# Patient Record
Sex: MALE | URBAN - METROPOLITAN AREA
[De-identification: names, ages, dates, MRNs, and addresses within clinical notes are randomized per-mention and may not be internally consistent; named-entity substitution may affect disease eponyms.]

---

## 2017-04-19 ENCOUNTER — EMERGENCY (EMERGENCY)
Facility: HOSPITAL | Age: 37
LOS: 1 days | Discharge: LEFT BEFORE DISCHARGE | End: 2017-04-19
Attending: EMERGENCY MEDICINE
Payer: COMMERCIAL

## 2017-04-19 VITALS
HEART RATE: 56 BPM | HEIGHT: 68 IN | DIASTOLIC BLOOD PRESSURE: 65 MMHG | OXYGEN SATURATION: 98 % | SYSTOLIC BLOOD PRESSURE: 135 MMHG | RESPIRATION RATE: 16 BRPM | WEIGHT: 164.91 LBS | TEMPERATURE: 98 F
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
## 2017-04-19 DIAGNOSIS — Z91.013 ALLERGY TO SEAFOOD: ICD-10-CM

## 2017-04-19 DIAGNOSIS — Z79.899 OTHER LONG TERM (CURRENT) DRUG THERAPY: ICD-10-CM
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
## 2017-04-19 DIAGNOSIS — R51 HEADACHE: ICD-10-CM

## 2017-04-19 DIAGNOSIS — S09.90XA UNSPECIFIED INJURY OF HEAD, INITIAL ENCOUNTER: ICD-10-CM

## 2017-04-19 DIAGNOSIS — Z91.018 ALLERGY TO OTHER FOODS: ICD-10-CM

## 2017-04-19 DIAGNOSIS — Z88.8 ALLERGY STATUS TO OTHER DRUGS, MEDICAMENTS AND BIOLOGICAL SUBSTANCES STATUS: ICD-10-CM

## 2017-04-19 PROCEDURE — 99283 EMERGENCY DEPT VISIT LOW MDM: CPT

## 2017-04-19 RX ORDER — ACETAMINOPHEN 500 MG
975 TABLET ORAL ONCE
Qty: 0 | Refills: 0 | Status: DISCONTINUED | OUTPATIENT
Start: 2017-04-19 | End: 2017-04-19

## 2017-04-19 NOTE — ED PROVIDER NOTE - OBJECTIVE STATEMENT
35 y/o male in ED c/o left sided HA since this AM.  pt states h/o migraines but has not had HA x yrs.  pt states given motrin this AM with no relief so started baging head on wall.  sent from [facility] for eval.  pt states HA now improved.  pt denies any LOC, neck pain, cp, sob, n/v/d/abd pain.

## 2017-04-19 NOTE — ED ADULT NURSE NOTE - EXPLANATION OF PATIENT'S REASON FOR LEAVING
Pt stated he did not want to wait for CT or anything else.  Pt denies any pain, states he did not hit his head hard on wall.  Pt neurologically intact.  Pt did not get evaluated by me.

## 2017-04-19 NOTE — ED ADULT NURSE REASSESSMENT NOTE - NS ED NURSE REASSESS COMMENT FT1
[employer] called requesting a psych eval; concerned that patient may not be taking meds as ordered.

## 2017-04-19 NOTE — ED ADULT NURSE NOTE - CHIEF COMPLAINT QUOTE
pt comes to ed outpatient [facility] patient. patient reports headache at this time. + hx of migraines. no other complaints. staff at [facility] reported to ems that keith was hitting his head against wall. no anticoags. patient reports he was doing that because his head hurt so much. denies si/hi.

## 2017-04-19 NOTE — ED ADULT TRIAGE NOTE - CHIEF COMPLAINT QUOTE
pt comes to ed outpatient [facility] patient. patient reports headache at this time. + hx of migraines. no other complaints. staff at [facility] reported to ems that keith was hitting his head against wall. no anticoags. patient reports he was doing that because his head hurt so much. denies si/hi.

## 2017-04-19 NOTE — ED PROVIDER NOTE - CARE PLAN
Principal Discharge DX:	Nonintractable headache, unspecified chronicity pattern, unspecified headache type  Secondary Diagnosis:	Head trauma, initial encounter

## 2017-07-03 ENCOUNTER — EMERGENCY (EMERGENCY)
Facility: HOSPITAL | Age: 37
LOS: 1 days | Discharge: OTHER INSTITUTION | End: 2017-07-03
Attending: EMERGENCY MEDICINE
Payer: COMMERCIAL

## 2017-07-03 VITALS
DIASTOLIC BLOOD PRESSURE: 64 MMHG | TEMPERATURE: 99 F | WEIGHT: 160.06 LBS | RESPIRATION RATE: 16 BRPM | SYSTOLIC BLOOD PRESSURE: 93 MMHG | OXYGEN SATURATION: 97 % | HEART RATE: 101 BPM

## 2017-07-03 VITALS
TEMPERATURE: 98 F | OXYGEN SATURATION: 96 % | SYSTOLIC BLOOD PRESSURE: 107 MMHG | RESPIRATION RATE: 16 BRPM | HEART RATE: 62 BPM | DIASTOLIC BLOOD PRESSURE: 63 MMHG

## 2017-07-03 LAB
ALBUMIN SERPL ELPH-MCNC: 4.5 G/DL — SIGNIFICANT CHANGE UP (ref 3.3–5.2)
ALP SERPL-CCNC: 62 U/L — SIGNIFICANT CHANGE UP (ref 40–120)
ALT FLD-CCNC: 10 U/L — SIGNIFICANT CHANGE UP
ANION GAP SERPL CALC-SCNC: 16 MMOL/L — SIGNIFICANT CHANGE UP (ref 5–17)
APAP SERPL-MCNC: <7.5 UG/ML — LOW (ref 10–26)
AST SERPL-CCNC: 17 U/L — SIGNIFICANT CHANGE UP
BASOPHILS # BLD AUTO: 0 K/UL — SIGNIFICANT CHANGE UP (ref 0–0.2)
BASOPHILS NFR BLD AUTO: 0.3 % — SIGNIFICANT CHANGE UP (ref 0–2)
BILIRUB SERPL-MCNC: 0.8 MG/DL — SIGNIFICANT CHANGE UP (ref 0.4–2)
BUN SERPL-MCNC: 16 MG/DL — SIGNIFICANT CHANGE UP (ref 8–20)
CALCIUM SERPL-MCNC: 9.8 MG/DL — SIGNIFICANT CHANGE UP (ref 8.6–10.2)
CHLORIDE SERPL-SCNC: 102 MMOL/L — SIGNIFICANT CHANGE UP (ref 98–107)
CO2 SERPL-SCNC: 23 MMOL/L — SIGNIFICANT CHANGE UP (ref 22–29)
CREAT SERPL-MCNC: 1.04 MG/DL — SIGNIFICANT CHANGE UP (ref 0.5–1.3)
EOSINOPHIL # BLD AUTO: 0.1 K/UL — SIGNIFICANT CHANGE UP (ref 0–0.5)
EOSINOPHIL NFR BLD AUTO: 0.4 % — SIGNIFICANT CHANGE UP (ref 0–6)
ETHANOL SERPL-MCNC: <10 MG/DL — SIGNIFICANT CHANGE UP
GLUCOSE SERPL-MCNC: 88 MG/DL — SIGNIFICANT CHANGE UP (ref 70–115)
HCT VFR BLD CALC: 42.8 % — SIGNIFICANT CHANGE UP (ref 42–52)
HGB BLD-MCNC: 14.9 G/DL — SIGNIFICANT CHANGE UP (ref 14–18)
LYMPHOCYTES # BLD AUTO: 1.4 K/UL — SIGNIFICANT CHANGE UP (ref 1–4.8)
LYMPHOCYTES # BLD AUTO: 9.9 % — LOW (ref 20–55)
MCHC RBC-ENTMCNC: 33.7 PG — HIGH (ref 27–31)
MCHC RBC-ENTMCNC: 34.8 G/DL — SIGNIFICANT CHANGE UP (ref 32–36)
MCV RBC AUTO: 96.8 FL — HIGH (ref 80–94)
MONOCYTES # BLD AUTO: 1 K/UL — HIGH (ref 0–0.8)
MONOCYTES NFR BLD AUTO: 6.8 % — SIGNIFICANT CHANGE UP (ref 3–10)
NEUTROPHILS # BLD AUTO: 11.8 K/UL — HIGH (ref 1.8–8)
NEUTROPHILS NFR BLD AUTO: 82.5 % — HIGH (ref 37–73)
PLATELET # BLD AUTO: 268 K/UL — SIGNIFICANT CHANGE UP (ref 150–400)
POTASSIUM SERPL-MCNC: 4.6 MMOL/L — SIGNIFICANT CHANGE UP (ref 3.5–5.3)
POTASSIUM SERPL-SCNC: 4.6 MMOL/L — SIGNIFICANT CHANGE UP (ref 3.5–5.3)
PROT SERPL-MCNC: 7.1 G/DL — SIGNIFICANT CHANGE UP (ref 6.6–8.7)
RBC # BLD: 4.42 M/UL — LOW (ref 4.6–6.2)
RBC # FLD: 14.8 % — SIGNIFICANT CHANGE UP (ref 11–15.6)
SALICYLATES SERPL-MCNC: <2 MG/DL — LOW (ref 10–20)
SODIUM SERPL-SCNC: 141 MMOL/L — SIGNIFICANT CHANGE UP (ref 135–145)
WBC # BLD: 14.4 K/UL — HIGH (ref 4.8–10.8)
WBC # FLD AUTO: 14.4 K/UL — HIGH (ref 4.8–10.8)

## 2017-07-03 PROCEDURE — 99285 EMERGENCY DEPT VISIT HI MDM: CPT | Mod: 25

## 2017-07-03 PROCEDURE — 80053 COMPREHEN METABOLIC PANEL: CPT

## 2017-07-03 PROCEDURE — 85027 COMPLETE CBC AUTOMATED: CPT

## 2017-07-03 PROCEDURE — 36415 COLL VENOUS BLD VENIPUNCTURE: CPT

## 2017-07-03 PROCEDURE — 90792 PSYCH DIAG EVAL W/MED SRVCS: CPT

## 2017-07-03 PROCEDURE — 93005 ELECTROCARDIOGRAM TRACING: CPT

## 2017-07-03 PROCEDURE — 99285 EMERGENCY DEPT VISIT HI MDM: CPT

## 2017-07-03 PROCEDURE — 80307 DRUG TEST PRSMV CHEM ANLYZR: CPT

## 2017-07-03 PROCEDURE — 93010 ELECTROCARDIOGRAM REPORT: CPT

## 2017-07-03 NOTE — ED BEHAVIORAL HEALTH NOTE - BEHAVIORAL HEALTH NOTE
SW Note - pt accepted to SE2 at [facility] on 9.37 per Otis PERSON&TAMEKA Carty. Called [employer] for Auth - Ref #[id number] by Kaylin MCMAHAN [facility] to reach out to [facility] on 7/5/17 before [facility]PM [facility] to transport pt to [facility].

## 2017-07-03 NOTE — ED PROVIDER NOTE - OBJECTIVE STATEMENT
36 year old male with a h/o schizophrenia and anxiety presents withh worsening anxiety and requesting to be seen by psych

## 2017-07-03 NOTE — ED ADULT NURSE NOTE - OBJECTIVE STATEMENT
pt comes to ED c/o feeling anxious continually for more than 1 weeks, pt received a&o x3 full affect, manic and tangential with conversations. 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 pt requested to give a urine sample but nursing received what appears to be water, pt confronted with observation and stated "im very careful with my urine , women have drank it and became pregnant" pt wanded in interview area by security and placed in yellow gown. pt comes to ED c/o feeling anxious continually for more than 1 weeks, pt received a&o x3 full affect, manic and tangential with conversations. 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 pt requested to give a urine sample but nursing received what appears to be water, pt confronted with observation and stated "im very careful with my urine , women have drank it and became pregnant" pt wanded in interview area by security and placed in yellow gown.

## 2017-07-03 NOTE — ED BEHAVIORAL HEALTH ASSESSMENT NOTE - SUMMARY
Patient is a 35y/o AA male denies [facility], with documented h/o schizophrenia and poor medication compliance currently under AOT domiciled in a crisis residence awaiting SPA housing, with h/o alcohol abuse, multiple inpatient psychiatric hospitalizations including [facility] x 6 moths, BIBA called by ACT team for increasing paranoia and bizarre behavior.  SW at [facility] spoke with [facility] ACT Team who reported this is not patients baseline and feel patient requires inpatient psychiatric hospitalization   Patient is currently psychotic with paranoid delusions. He denies S/H/I/IP/P, and is currently not aggressive and cooperative. Patient will require inpatient psychiatric hospitalization for safety and medication management status involuntary.

## 2017-07-03 NOTE — ED BEHAVIORAL HEALTH NOTE - BEHAVIORAL HEALTH NOTE
SW Note: Pt BIB [facility] from Crisis residence for paranoid behavior. Spoke with Moira from [facility] -[id number]. ACT team was called to crisis residence to see pt because staff was reporting pt was anxious, pacing and acting bizarre. Moira met with pt and found him to be paranoid. Talking about seeing dead people and fearful that people were following him. he was laughing inappropriately, pacing and appeared anxious. She reports this is not pts baseline and was unsure of any recent stressors/reasons for change in mental status. Staff at crisis report pt has been taking his PO med ( zyprexa). Labs ordered.  Pt is on an active AOT order. Resides at crisis residence pending permanent housing thru [facility]. Hx of substance abuse. AOT and current med sheet faxed to ER for review. Received and placed on chart.  staff aware of pt in the main ER.   SW to follow for eval and disposition

## 2017-07-03 NOTE — ED ADULT TRIAGE NOTE - CHIEF COMPLAINT QUOTE
pt comes to ed from home c/o anxiety. states never formally dx with psychiatric issues "but I feel manic". patient denies si/hi. reports "I need psychiatric help"

## 2017-07-03 NOTE — ED BEHAVIORAL HEALTH ASSESSMENT NOTE - HPI (INCLUDE ILLNESS QUALITY, SEVERITY, DURATION, TIMING, CONTEXT, MODIFYING FACTORS, ASSOCIATED SIGNS AND SYMPTOMS)
Patient is a 35y/o AA male denies PMH, with documented h/o schizophrenia and poor medication compliance currently under AOT domiciled in a crisis residence awaiting SPA housing, with h/o alcohol abuse, multiple inpatient psychiatric hospitalizations including [facility] x 6 moths, BIBA called by ACT team for increasing paranoia and bizarre behavior.  Patient reports he has been seeing people who have resurrected from the dead walking in the community. He believes he is being followed by the dead and mark not understand why he was chosen to witness the resurrection. Patient reports he has been feeling " manic" and has poor sleep, increased energy, racing thoughts, pacing and increased anxiety. He denies current or recent S/H/I/I/P or A/H. Patient reports he does not feel comfortable at his current housing and is eager for SPA housing. Patient has been incarcerated x 2 for burglary last being 1/2017. He reports he has been compliant with his medication however feels he needs adjustment for increased anxiety. He denies recent alcohol or illicit substance use.

## 2017-07-03 NOTE — ED BEHAVIORAL HEALTH ASSESSMENT NOTE - DESCRIPTION
While in medical ED patient left, bought lunch and later returned to ED. Cooperative but guarded. unknown incarcerated x 2, never , AOT, ACT team [facility] incarcerated x 2 for burglary, released from [facility] 1/2017, currently on parole, never , AOT, ACT team [facility]

## 2017-07-03 NOTE — ED PROVIDER NOTE - PROGRESS NOTE DETAILS
pt was transferred to [facility] where he told them that he refuses to give urine because it was going to be used for nefarious purposes. pt to be transferred to [facility] awaiting acceptance

## 2017-07-04 NOTE — ED ADULT NURSE REASSESSMENT NOTE - NS ED NURSE REASSESS COMMENT FT1
compliant with repeat EKG, awaiting transfer to [facility].
pt requested to provide urine but is refusing because "I don't give my urine to laboratories so they can clone me" pt reassured that our laboratories won't do that but pt is adamant.
pt sleeping with no acute distress noted, pt accepted to [facility] via transfer, report given to Josselyn farris via tele con. awaiting transport.
pt currently resting, offers no complaints at this time/, awaiting transfer.

## 2017-07-14 ENCOUNTER — INPATIENT (INPATIENT)
Facility: HOSPITAL | Age: 37
LOS: 6 days | Discharge: HOME | DRG: 917 | End: 2017-07-21
Attending: INTERNAL MEDICINE | Admitting: EMERGENCY MEDICINE
Payer: COMMERCIAL

## 2017-07-14 VITALS — RESPIRATION RATE: 16 BRPM | DIASTOLIC BLOOD PRESSURE: 40 MMHG | HEART RATE: 91 BPM | SYSTOLIC BLOOD PRESSURE: 82 MMHG

## 2017-07-14 DIAGNOSIS — R56.9 UNSPECIFIED CONVULSIONS: ICD-10-CM

## 2017-07-14 DIAGNOSIS — M62.82 RHABDOMYOLYSIS: ICD-10-CM

## 2017-07-14 DIAGNOSIS — G92 TOXIC ENCEPHALOPATHY: ICD-10-CM

## 2017-07-14 DIAGNOSIS — N17.9 ACUTE KIDNEY FAILURE, UNSPECIFIED: ICD-10-CM

## 2017-07-14 DIAGNOSIS — J96.01 ACUTE RESPIRATORY FAILURE WITH HYPOXIA: ICD-10-CM

## 2017-07-14 DIAGNOSIS — E87.5 HYPERKALEMIA: ICD-10-CM

## 2017-07-14 DIAGNOSIS — E87.2 ACIDOSIS: ICD-10-CM

## 2017-07-14 LAB
ABO RH CONFIRMATION: SIGNIFICANT CHANGE UP
ALBUMIN SERPL ELPH-MCNC: 4.9 G/DL — SIGNIFICANT CHANGE UP (ref 3.3–5.2)
ALP SERPL-CCNC: 66 U/L — SIGNIFICANT CHANGE UP (ref 40–120)
ALT FLD-CCNC: 135 U/L — HIGH
AMPHET UR-MCNC: NEGATIVE — SIGNIFICANT CHANGE UP
ANION GAP SERPL CALC-SCNC: 25 MMOL/L — HIGH (ref 5–17)
ANION GAP SERPL CALC-SCNC: 30 MMOL/L — HIGH (ref 5–17)
ANISOCYTOSIS BLD QL: SLIGHT — SIGNIFICANT CHANGE UP
APPEARANCE UR: CLEAR — SIGNIFICANT CHANGE UP
APTT BLD: 32.4 SEC — SIGNIFICANT CHANGE UP (ref 27.5–37.4)
AST SERPL-CCNC: 124 U/L — HIGH
BARBITURATES UR SCN-MCNC: NEGATIVE — SIGNIFICANT CHANGE UP
BENZODIAZ UR-MCNC: NEGATIVE — SIGNIFICANT CHANGE UP
BILIRUB SERPL-MCNC: 0.7 MG/DL — SIGNIFICANT CHANGE UP (ref 0.4–2)
BILIRUB UR-MCNC: NEGATIVE — SIGNIFICANT CHANGE UP
BLD GP AB SCN SERPL QL: SIGNIFICANT CHANGE UP
BUN SERPL-MCNC: 23 MG/DL — HIGH (ref 8–20)
BUN SERPL-MCNC: 23 MG/DL — HIGH (ref 8–20)
CALCIUM SERPL-MCNC: 6.8 MG/DL — LOW (ref 8.6–10.2)
CALCIUM SERPL-MCNC: 7.9 MG/DL — LOW (ref 8.6–10.2)
CHLORIDE SERPL-SCNC: 90 MMOL/L — LOW (ref 98–107)
CHLORIDE SERPL-SCNC: 97 MMOL/L — LOW (ref 98–107)
CO2 SERPL-SCNC: 16 MMOL/L — LOW (ref 22–29)
CO2 SERPL-SCNC: 16 MMOL/L — LOW (ref 22–29)
COCAINE METAB.OTHER UR-MCNC: POSITIVE
COLOR SPEC: YELLOW — SIGNIFICANT CHANGE UP
COMMENT - URINE: SIGNIFICANT CHANGE UP
CREAT SERPL-MCNC: 2.67 MG/DL — HIGH (ref 0.5–1.3)
CREAT SERPL-MCNC: 3.16 MG/DL — HIGH (ref 0.5–1.3)
DIFF PNL FLD: ABNORMAL
ELLIPTOCYTES BLD QL SMEAR: SLIGHT — SIGNIFICANT CHANGE UP
GAS PNL BLDA: SIGNIFICANT CHANGE UP
GAS PNL BLDA: SIGNIFICANT CHANGE UP
GLUCOSE SERPL-MCNC: 154 MG/DL — HIGH (ref 70–115)
GLUCOSE SERPL-MCNC: 163 MG/DL — HIGH (ref 70–115)
GLUCOSE UR QL: NEGATIVE MG/DL — SIGNIFICANT CHANGE UP
HCT VFR BLD CALC: 50.8 % — SIGNIFICANT CHANGE UP (ref 42–52)
HGB BLD-MCNC: 16.7 G/DL — SIGNIFICANT CHANGE UP (ref 14–18)
HYPOCHROMIA BLD QL: SLIGHT — SIGNIFICANT CHANGE UP
INR BLD: 1.02 RATIO — SIGNIFICANT CHANGE UP (ref 0.88–1.16)
KETONES UR-MCNC: ABNORMAL
LACTATE BLDV-MCNC: 8.5 MMOL/L — CRITICAL HIGH (ref 0.5–2)
LEUKOCYTE ESTERASE UR-ACNC: NEGATIVE — SIGNIFICANT CHANGE UP
LYMPHOCYTES # BLD AUTO: 8 % — LOW (ref 20–55)
MACROCYTES BLD QL: SLIGHT — SIGNIFICANT CHANGE UP
MCHC RBC-ENTMCNC: 32.9 G/DL — SIGNIFICANT CHANGE UP (ref 32–36)
MCHC RBC-ENTMCNC: 33.9 PG — HIGH (ref 27–31)
MCV RBC AUTO: 103.3 FL — HIGH (ref 80–94)
METHADONE UR-MCNC: NEGATIVE — SIGNIFICANT CHANGE UP
MICROCYTES BLD QL: SLIGHT — SIGNIFICANT CHANGE UP
MONOCYTES NFR BLD AUTO: 8 % — SIGNIFICANT CHANGE UP (ref 3–10)
MYELOCYTES NFR BLD: 1 % — HIGH (ref 0–0)
NEUTROPHILS NFR BLD AUTO: 83 % — HIGH (ref 37–73)
NITRITE UR-MCNC: NEGATIVE — SIGNIFICANT CHANGE UP
OPIATES UR-MCNC: POSITIVE
OVALOCYTES BLD QL SMEAR: SLIGHT — SIGNIFICANT CHANGE UP
PCP SPEC-MCNC: SIGNIFICANT CHANGE UP
PCP UR-MCNC: NEGATIVE — SIGNIFICANT CHANGE UP
PH UR: 5 — SIGNIFICANT CHANGE UP (ref 5–8)
PLAT MORPH BLD: NORMAL — SIGNIFICANT CHANGE UP
PLATELET # BLD AUTO: 265 K/UL — SIGNIFICANT CHANGE UP (ref 150–400)
POIKILOCYTOSIS BLD QL AUTO: SLIGHT — SIGNIFICANT CHANGE UP
POTASSIUM SERPL-MCNC: 6.8 MMOL/L — CRITICAL HIGH (ref 3.5–5.3)
POTASSIUM SERPL-MCNC: 8.9 MMOL/L — CRITICAL HIGH (ref 3.5–5.3)
POTASSIUM SERPL-SCNC: 6.8 MMOL/L — CRITICAL HIGH (ref 3.5–5.3)
POTASSIUM SERPL-SCNC: 8.9 MMOL/L — CRITICAL HIGH (ref 3.5–5.3)
PROT SERPL-MCNC: 7.3 G/DL — SIGNIFICANT CHANGE UP (ref 6.6–8.7)
PROT UR-MCNC: 100 MG/DL
PROTHROM AB SERPL-ACNC: 11.2 SEC — SIGNIFICANT CHANGE UP (ref 9.8–12.7)
RBC # BLD: 4.92 M/UL — SIGNIFICANT CHANGE UP (ref 4.6–6.2)
RBC # FLD: 15.2 % — SIGNIFICANT CHANGE UP (ref 11–15.6)
RBC BLD AUTO: ABNORMAL
RBC CASTS # UR COMP ASSIST: ABNORMAL /HPF (ref 0–4)
SODIUM SERPL-SCNC: 136 MMOL/L — SIGNIFICANT CHANGE UP (ref 135–145)
SODIUM SERPL-SCNC: 138 MMOL/L — SIGNIFICANT CHANGE UP (ref 135–145)
SP GR SPEC: 1.02 — SIGNIFICANT CHANGE UP (ref 1.01–1.02)
THC UR QL: NEGATIVE — SIGNIFICANT CHANGE UP
TYPE + AB SCN PNL BLD: SIGNIFICANT CHANGE UP
UROBILINOGEN FLD QL: NEGATIVE MG/DL — SIGNIFICANT CHANGE UP
WBC # BLD: 32.2 K/UL — HIGH (ref 4.8–10.8)
WBC # FLD AUTO: 32.2 K/UL — HIGH (ref 4.8–10.8)
WBC UR QL: SIGNIFICANT CHANGE UP

## 2017-07-14 PROCEDURE — 99291 CRITICAL CARE FIRST HOUR: CPT | Mod: 25

## 2017-07-14 PROCEDURE — 71010: CPT | Mod: 26

## 2017-07-14 PROCEDURE — 95822 EEG COMA OR SLEEP ONLY: CPT | Mod: 26

## 2017-07-14 PROCEDURE — 72125 CT NECK SPINE W/O DYE: CPT | Mod: 26

## 2017-07-14 PROCEDURE — 70450 CT HEAD/BRAIN W/O DYE: CPT | Mod: 26

## 2017-07-14 PROCEDURE — 36556 INSERT NON-TUNNEL CV CATH: CPT

## 2017-07-14 PROCEDURE — 99291 CRITICAL CARE FIRST HOUR: CPT

## 2017-07-14 PROCEDURE — 99285 EMERGENCY DEPT VISIT HI MDM: CPT

## 2017-07-14 PROCEDURE — 31500 INSERT EMERGENCY AIRWAY: CPT

## 2017-07-14 RX ORDER — SODIUM CHLORIDE 9 MG/ML
3 INJECTION INTRAMUSCULAR; INTRAVENOUS; SUBCUTANEOUS ONCE
Qty: 0 | Refills: 0 | Status: COMPLETED | OUTPATIENT
Start: 2017-07-14 | End: 2017-07-14

## 2017-07-14 RX ORDER — SODIUM CHLORIDE 9 MG/ML
500 INJECTION INTRAMUSCULAR; INTRAVENOUS; SUBCUTANEOUS
Qty: 0 | Refills: 0 | Status: COMPLETED | OUTPATIENT
Start: 2017-07-14 | End: 2017-07-14

## 2017-07-14 RX ORDER — CHLORHEXIDINE GLUCONATE 213 G/1000ML
15 SOLUTION TOPICAL
Qty: 0 | Refills: 0 | Status: DISCONTINUED | OUTPATIENT
Start: 2017-07-14 | End: 2017-07-15

## 2017-07-14 RX ORDER — PROPOFOL 10 MG/ML
10 INJECTION, EMULSION INTRAVENOUS
Qty: 1000 | Refills: 0 | Status: DISCONTINUED | OUTPATIENT
Start: 2017-07-14 | End: 2017-07-15

## 2017-07-14 RX ORDER — PROPOFOL 10 MG/ML
20 INJECTION, EMULSION INTRAVENOUS
Qty: 1000 | Refills: 0 | Status: DISCONTINUED | OUTPATIENT
Start: 2017-07-14 | End: 2017-07-14

## 2017-07-14 RX ORDER — PANTOPRAZOLE SODIUM 20 MG/1
40 TABLET, DELAYED RELEASE ORAL DAILY
Qty: 0 | Refills: 0 | Status: DISCONTINUED | OUTPATIENT
Start: 2017-07-14 | End: 2017-07-15

## 2017-07-14 RX ORDER — SODIUM CHLORIDE 9 MG/ML
2000 INJECTION INTRAMUSCULAR; INTRAVENOUS; SUBCUTANEOUS ONCE
Qty: 0 | Refills: 0 | Status: COMPLETED | OUTPATIENT
Start: 2017-07-14 | End: 2017-07-14

## 2017-07-14 RX ORDER — ENOXAPARIN SODIUM 100 MG/ML
30 INJECTION SUBCUTANEOUS EVERY 24 HOURS
Qty: 0 | Refills: 0 | Status: DISCONTINUED | OUTPATIENT
Start: 2017-07-14 | End: 2017-07-18

## 2017-07-14 RX ORDER — MAGNESIUM SULFATE 500 MG/ML
2 VIAL (ML) INJECTION ONCE
Qty: 0 | Refills: 0 | Status: COMPLETED | OUTPATIENT
Start: 2017-07-14 | End: 2017-07-14

## 2017-07-14 RX ORDER — INSULIN HUMAN 100 [IU]/ML
10 INJECTION, SOLUTION SUBCUTANEOUS ONCE
Qty: 0 | Refills: 0 | Status: COMPLETED | OUTPATIENT
Start: 2017-07-14 | End: 2017-07-14

## 2017-07-14 RX ORDER — ACTIVATED CHARCOAL 208 MG/ML
50 SUSPENSION ORAL ONCE
Qty: 0 | Refills: 0 | Status: COMPLETED | OUTPATIENT
Start: 2017-07-14 | End: 2017-07-14

## 2017-07-14 RX ORDER — SODIUM CHLORIDE 9 MG/ML
1000 INJECTION, SOLUTION INTRAVENOUS
Qty: 0 | Refills: 0 | Status: DISCONTINUED | OUTPATIENT
Start: 2017-07-14 | End: 2017-07-14

## 2017-07-14 RX ORDER — DEXTROSE 50 % IN WATER 50 %
50 SYRINGE (ML) INTRAVENOUS ONCE
Qty: 0 | Refills: 0 | Status: COMPLETED | OUTPATIENT
Start: 2017-07-14 | End: 2017-07-14

## 2017-07-14 RX ORDER — ROCURONIUM BROMIDE 10 MG/ML
50 VIAL (ML) INTRAVENOUS ONCE
Qty: 0 | Refills: 0 | Status: COMPLETED | OUTPATIENT
Start: 2017-07-14 | End: 2017-07-14

## 2017-07-14 RX ORDER — SODIUM BICARBONATE 1 MEQ/ML
50 SYRINGE (ML) INTRAVENOUS ONCE
Qty: 0 | Refills: 0 | Status: COMPLETED | OUTPATIENT
Start: 2017-07-14 | End: 2017-07-14

## 2017-07-14 RX ORDER — ERTAPENEM SODIUM 1 G/1
1000 INJECTION, POWDER, LYOPHILIZED, FOR SOLUTION INTRAMUSCULAR; INTRAVENOUS ONCE
Qty: 0 | Refills: 0 | Status: COMPLETED | OUTPATIENT
Start: 2017-07-14 | End: 2017-07-14

## 2017-07-14 RX ORDER — NALOXONE HYDROCHLORIDE 4 MG/.1ML
2 SPRAY NASAL ONCE
Qty: 0 | Refills: 0 | Status: COMPLETED | OUTPATIENT
Start: 2017-07-14 | End: 2017-07-14

## 2017-07-14 RX ORDER — SODIUM CHLORIDE 9 MG/ML
1000 INJECTION, SOLUTION INTRAVENOUS
Qty: 0 | Refills: 0 | Status: DISCONTINUED | OUTPATIENT
Start: 2017-07-14 | End: 2017-07-15

## 2017-07-14 RX ORDER — CALCIUM GLUCONATE 100 MG/ML
2 VIAL (ML) INTRAVENOUS ONCE
Qty: 2 | Refills: 0 | Status: COMPLETED | OUTPATIENT
Start: 2017-07-14 | End: 2017-07-14

## 2017-07-14 RX ORDER — CALCIUM GLUCONATE 100 MG/ML
1 VIAL (ML) INTRAVENOUS ONCE
Qty: 1 | Refills: 0 | Status: COMPLETED | OUTPATIENT
Start: 2017-07-14 | End: 2017-07-14

## 2017-07-14 RX ORDER — SODIUM CHLORIDE 9 MG/ML
1000 INJECTION INTRAMUSCULAR; INTRAVENOUS; SUBCUTANEOUS
Qty: 0 | Refills: 0 | Status: DISCONTINUED | OUTPATIENT
Start: 2017-07-14 | End: 2017-07-14

## 2017-07-14 RX ORDER — ETOMIDATE 2 MG/ML
20 INJECTION INTRAVENOUS ONCE
Qty: 0 | Refills: 0 | Status: COMPLETED | OUTPATIENT
Start: 2017-07-14 | End: 2017-07-14

## 2017-07-14 RX ADMIN — SODIUM CHLORIDE 200 MILLILITER(S): 9 INJECTION, SOLUTION INTRAVENOUS at 10:54

## 2017-07-14 RX ADMIN — SODIUM CHLORIDE 2000 MILLILITER(S): 9 INJECTION INTRAMUSCULAR; INTRAVENOUS; SUBCUTANEOUS at 10:45

## 2017-07-14 RX ADMIN — ERTAPENEM SODIUM 120 MILLIGRAM(S): 1 INJECTION, POWDER, LYOPHILIZED, FOR SOLUTION INTRAMUSCULAR; INTRAVENOUS at 10:40

## 2017-07-14 RX ADMIN — Medication 50 GRAM(S): at 09:50

## 2017-07-14 RX ADMIN — SODIUM CHLORIDE 200 MILLILITER(S): 9 INJECTION, SOLUTION INTRAVENOUS at 15:24

## 2017-07-14 RX ADMIN — PROPOFOL 2.55 MICROGRAM(S)/KG/MIN: 10 INJECTION, EMULSION INTRAVENOUS at 12:03

## 2017-07-14 RX ADMIN — NALOXONE HYDROCHLORIDE 2 MILLIGRAM(S): 4 SPRAY NASAL at 09:42

## 2017-07-14 RX ADMIN — SODIUM CHLORIDE 3 MILLILITER(S): 9 INJECTION INTRAMUSCULAR; INTRAVENOUS; SUBCUTANEOUS at 10:47

## 2017-07-14 RX ADMIN — ENOXAPARIN SODIUM 30 MILLIGRAM(S): 100 INJECTION SUBCUTANEOUS at 18:48

## 2017-07-14 RX ADMIN — SODIUM CHLORIDE 2000 MILLILITER(S): 9 INJECTION INTRAMUSCULAR; INTRAVENOUS; SUBCUTANEOUS at 09:45

## 2017-07-14 RX ADMIN — CHLORHEXIDINE GLUCONATE 15 MILLILITER(S): 213 SOLUTION TOPICAL at 18:48

## 2017-07-14 RX ADMIN — SODIUM CHLORIDE 2000 MILLILITER(S): 9 INJECTION INTRAMUSCULAR; INTRAVENOUS; SUBCUTANEOUS at 12:03

## 2017-07-14 RX ADMIN — Medication 2 MILLIGRAM(S): at 12:04

## 2017-07-14 RX ADMIN — SODIUM CHLORIDE 2000 MILLILITER(S): 9 INJECTION INTRAMUSCULAR; INTRAVENOUS; SUBCUTANEOUS at 10:46

## 2017-07-14 RX ADMIN — PANTOPRAZOLE SODIUM 40 MILLIGRAM(S): 20 TABLET, DELAYED RELEASE ORAL at 18:49

## 2017-07-14 RX ADMIN — Medication 200 GRAM(S): at 13:03

## 2017-07-14 RX ADMIN — Medication 4 MILLIGRAM(S): at 13:05

## 2017-07-14 RX ADMIN — Medication 2 MILLIGRAM(S): at 10:00

## 2017-07-14 RX ADMIN — ACTIVATED CHARCOAL 50 GRAM(S): 208 SUSPENSION ORAL at 12:55

## 2017-07-14 RX ADMIN — INSULIN HUMAN 10 UNIT(S): 100 INJECTION, SOLUTION SUBCUTANEOUS at 13:11

## 2017-07-14 RX ADMIN — SODIUM CHLORIDE 2000 MILLILITER(S): 9 INJECTION INTRAMUSCULAR; INTRAVENOUS; SUBCUTANEOUS at 12:04

## 2017-07-14 RX ADMIN — Medication 50 MILLIEQUIVALENT(S): at 09:50

## 2017-07-14 RX ADMIN — SODIUM CHLORIDE 2000 MILLILITER(S): 9 INJECTION INTRAMUSCULAR; INTRAVENOUS; SUBCUTANEOUS at 13:05

## 2017-07-14 RX ADMIN — Medication 50 MILLIGRAM(S): at 09:50

## 2017-07-14 RX ADMIN — Medication 50 MILLILITER(S): at 13:11

## 2017-07-14 RX ADMIN — SODIUM CHLORIDE 2000 MILLILITER(S): 9 INJECTION INTRAMUSCULAR; INTRAVENOUS; SUBCUTANEOUS at 13:04

## 2017-07-14 RX ADMIN — ETOMIDATE 20 MILLIGRAM(S): 2 INJECTION INTRAVENOUS at 09:50

## 2017-07-14 RX ADMIN — Medication 200 GRAM(S): at 16:51

## 2017-07-14 NOTE — H&P ADULT - PROBLEM SELECTOR PLAN 2
EEG monitoring/tech called in  Neurology evaluation  Continue propofol, if EEG positive will add AED

## 2017-07-14 NOTE — CONSULT NOTE ADULT - SUBJECTIVE AND OBJECTIVE BOX
Patient is a 29y old  Male who presents with a chief complaint of     HPI:   30 y/o M with psychiatric hx resident at [place] admitted with likely drug overdose. Intubated for airway protection. On presentation noted to wide complex tachcyardia  and hyperkalemia with profound acidosis. Subsequent ECG after NAHCO3 SR with IRBB.  ROS: Unable to obtain due to intubated and sedated state.    PAST MEDICAL & SURGICAL HISTORY: Unknown      PREVIOUS DIAGNOSTIC TESTING:      Echo : Pending    Allergies    Allergy Status Unknown    Intolerances        MEDICATIONS  (STANDING):  propofol Infusion 20 MICROgram(s)/kG/Min (10.2 mL/Hr) IV Continuous <Continuous>  sodium chloride 0.225% 1000 milliLiter(s) (150 mL/Hr) IV Continuous <Continuous>  enoxaparin Injectable 30 milliGRAM(s) SubCutaneous every 24 hours  pantoprazole  Injectable 40 milliGRAM(s) IV Push daily  chlorhexidine 0.12% Liquid 15 milliLiter(s) Swish and Spit two times a day    MEDICATIONS  (PRN):      FAMILY HISTORY: Unknown      SOCIAL HISTORY: resides at [place]     CIGARETTES:unknown    ALCOHOL:unknown    REVIEW OF SYSTEMS: Unable to obtain       Vital Signs Last 24 Hrs  T(C): 36.3 (2017 10:47), Max: 36.6 (2017 10:33)  T(F): 97.3 (2017 10:47), Max: 97.9 (2017 10:33)  HR: 81 (2017 13:17) (70 - 106)  BP: 108/64 (2017 13:17) (67/37 - 146/89)  BP(mean): --  RR: 28 (2017 13:17) (14 - 30)  SpO2: 98% (2017 10:16) (98% - 100%)    Daily     Daily     I&O's Detail      PHYSICAL EXAM:  Intubated and sedated  HEENT:   Normal oral mucosa, PERRL, EOMI, sclera non-icteric	  Lymphatic: No cervical lymphadenopathy  Cardiovascular: Normal S1 S2, No JVD, No cardiac murmurs, No carotid bruits, No peripheral edema  Respiratory: Lungs clear to auscultation	  Psychiatry: A & O x 3, Mood & affect appropriate  Gastrointestinal:  Soft, Non-tender, + BS, no bruits	  Skin: No rashes, No ecchymoses, No cyanosis  Neurologic: Grossly non-focal with full strength in all four extremities  Extremities: Normal range of motion, No clubbing, cyanosis or edema  Vascular: Peripheral pulses palpable 2+ bilaterally      INTERPRETATION OF TELEMETRY:    ECG: #1 WCT cannot r/o PAF with aberrancy LBBB  ECG #2: post NAHco3 ; SR with IRBBB    LABS:                        16.7   32.2  )-----------( 265      ( 2017 10:10 )             50.8     07-14    138  |  97<L>  |  23.0<H>  ----------------------------<  163<H>  6.8<HH>   |  16.0<L>  |  2.67<H>    Ca    6.8<L>      2017 10:54    TPro  7.3  /  Alb  4.9  /  TBili  0.7  /  DBili  x   /  AST  124<H>  /  ALT  135<H>  /  AlkPhos  66  07-14        PT/INR - ( 2017 10:10 )   PT: 11.2 sec;   INR: 1.02 ratio         PTT - ( 2017 10:10 )  PTT:32.4 sec  Urinalysis Basic - ( 2017 10:28 )    Color: Yellow / Appearance: Clear / S.025 / pH: x  Gluc: x / Ketone: Trace  / Bili: Negative / Urobili: Negative mg/dL   Blood: x / Protein: 100 mg/dL / Nitrite: Negative   Leuk Esterase: Negative / RBC: 3-5 /HPF / WBC 0-2   Sq Epi: x / Non Sq Epi: x / Bacteria: x      I&O's Summary    BNP  RADIOLOGY & ADDITIONAL STUDIES:

## 2017-07-14 NOTE — ED PROVIDER NOTE - CRITICAL CARE PROVIDED
documentation/additional history taking/interpretation of diagnostic studies/consultation with other physicians/direct patient care (not related to procedure)

## 2017-07-14 NOTE — ED PROVIDER NOTE - OBJECTIVE STATEMENT
CC: AMS  Presenting symptoms: 28yo male brought in by EMS from [facility] with unresponsiveness, found unresponsive by staff and sternal rubbing to arouse patient and EMS gave 4mg narcan intranasally and 2mg IV with improved breathing rate.  Patient moaning and AMS on arrival with low O2 sat.  Patient immediately intubated and given multiple medications.  Narcan 2mg IV in ED without change in mental status.  Pertinent Positives: AMS  Timing: constant today  Severity: severe  Patient was found with unknown vile of solution next to him.

## 2017-07-14 NOTE — ED PROCEDURE NOTE - CPROC ED TRACHE INTUB DETAIL1
Patient was pre-oxygenated. An endotracheal tube (ETT) was placed through the vocal cords into the trachea. During intubation, staff applied gentle pressure to the cricoid cartilage. ETT position was confirmed by auscultation of bilateral breath sounds to all lung fields. ETCO2 level was appropriate./Patient connected to ventilator with settings as ordered.

## 2017-07-14 NOTE — H&P ADULT - PROBLEM SELECTOR PLAN 4
Volume resuscitation  Monitor for evidence of compartment syndrome which does not actively have  Bicarb gtt as above

## 2017-07-14 NOTE — H&P ADULT - HISTORY OF PRESENT ILLNESS
Pt is an approx 36 YOM h/o schizophrenia, drug abuse who was found unresponsive in an outpt [facility] facility.  As per ED pt was given multiple doses of Narcan at facility and by EMS, upon arrival in ED pt's respiratory rate improved but his mental status did not so he was intubated for airway protection.  He was treated for profound hyperkalemia with EKG changes which have now improved.  He is unresponsive at present and having jaw clenching and rhythmic and repetitive b/l upper extremities movements.  Pt given propofol.  Attempted to transfer to [facility] for continuous EEG monitoring, which is difficult to obtain in this facility, but transfer was refused.  As per Dr DE LOS SANTOS in ED he spoke with pt's sister, he has h/o paranoid/delusional schizophrenia and refuses to be compliant with his meds so she has not seen him because she feels he is too unstable to be around her family.

## 2017-07-14 NOTE — CONSULT NOTE ADULT - SUBJECTIVE AND OBJECTIVE BOX
Patient is a 29y old  Male who presents with hyperkalemia    HPI: OD  with psych. HX ,Hx from staff and sister. No hx renal disease    PAST MEDICAL & SURGICAL HISTORY:  Unknown.    FAMILY HISTORY:  [place]    Social History: Unknown    MEDICATIONS  (STANDING):  sodium chloride 0.9% 1000 milliLiter(s) (200 mL/Hr) IV Continuous <Continuous>  propofol Infusion 20 MICROgram(s)/kG/Min (10.2 mL/Hr) IV Continuous <Continuous>  insulin regular  human recombinant. 10 Unit(s) IV Push once  dextrose 50% Injectable 50 milliLiter(s) IV Push Once    MEDICATIONS  (PRN):   Meds reviewed    Allergies    Allergy Status Unknown      REVIEW OF SYSTEMS:    CONSTITUTIONAL:  Unknown  EYES: No eye pain, visual disturbances, or discharge  ENMT:  Not Known  NECK: Not [place]  BREASTS: Unknown  RESPIRATORY: unknown  CARDIOVASCULAR: Unknown  GASTROINTESTINAL: Not known   GENITOURINARY : Unknown  ENDOCRINE:: unknown      Vital Signs Last 24 Hrs  T(C): 36.3 (2017 10:47), Max: 36.6 (2017 10:33)  T(F): 97.3 (2017 10:47), Max: 97.9 (2017 10:33)  HR: 88 (2017 11:45) (70 - 106)  BP: 117/61 (2017 11:45) (67/37 - 146/89)  BP(mean): --  RR: 28 (2017 11:45) (14 - 30)  SpO2: 98% (2017 10:16) (98% - 100%)  Daily     Daily     PHYSICAL EXAM:    GENERAL: appears acutely ill, sedated.  HEAD:  Atraumatic, Normocephalic  EYES:  conjunctiva and sclera clear  ENMT: No tonsillar erythema, exudates, or enlargement;   NECK: Supple, neck  veins full  NERVOUS SYSTEM: sedated with pupil reflex present  CHEST/LUNG: Clear to percussion bilaterally; No rales, rhonchi, wheezing, or rubs  HEART: Regular rate and rhythm; No murmurs, rubs, or gallops; sternal area red  ABDOMEN: Soft, Nontender, Nondistended; Bowel sounds present,   EXTREMITIES:  no edema, burn marks both lower arms and dorsal area of feet, no blisters  LYMPH: No lymphadenopathy noted  SKIN: No rashes or lesions, pale   negron with yellow urine    LABS:                        16.7   32.2  )-----------( 265      ( 2017 10:10 )             50.8     07-    138  |  97<L>  |  23.0<H>  ----------------------------<  163<H>  6.8<HH>   |  16.0<L>  |  2.67<H>    Ca    6.8<L>      2017 10:54    TPro  7.3  /  Alb  4.9  /  TBili  0.7  /  DBili  x   /  AST  124<H>  /  ALT  135<H>  /  AlkPhos  66  07-14    PT/INR - ( 2017 10:10 )   PT: 11.2 sec;   INR: 1.02 ratio         PTT - ( 2017 10:10 )  PTT:32.4 sec  Urinalysis Basic - ( 2017 10:28 )    Color: Yellow / Appearance: Clear / S.025 / pH: x  Gluc: x / Ketone: Trace  / Bili: Negative / Urobili: Negative mg/dL   Blood: x / Protein: 100 mg/dL / Nitrite: Negative   Leuk Esterase: Negative / RBC: 3-5 /HPF / WBC 0-2   Sq Epi: x / Non Sq Epi: x / Bacteria: x        ABG - ( 2017 10:28 )  pH: 7.19  /  pCO2: 47    /  pO2: 552   / HCO3: 16    / Base Excess: -10.6 /  SaO2: 100                   RADIOLOGY & ADDITIONAL TESTS:

## 2017-07-14 NOTE — CONSULT NOTE ADULT - SUBJECTIVE AND OBJECTIVE BOX
[facility]                                 Ольга Christie, & Mike                                  [address]                                        [place], [place], [address]                                             [phone]    HISTORY:    (REAL NAME: JUSTICE SHAH; : [birth year])    The patient is a 36y Male who was found unresponsive. He reportedly had seizure in the ER and was intubated and admitted to ICU.     PAST MEDICAL & SURGICAL HISTORY:      MEDICATION PRIOR TO ADMISSION:  Unknown.    Allergies  Allergy Status Unknown    SOCIAL HISTORY:  Unknown although abuses drugs as below.     FAMILY HISTORY:  Unknown.     ROS:  Unobtainable due to patient's condition.       Exam:  Vital Signs Last 24 Hrs  T(F): 97.3 (2017 10:47), Max: 97.9 (2017 10:33)  HR: 81 (2017 13:17) (70 - 106)  BP: 108/64 (2017 13:17) (67/37 - 146/89)  RR: 28 (2017 13:17) (14 - 30)  SpO2: 98% (2017 10:16) (98% - 100%)  General: On mechanical ventilator.   Carotid bruits absent.    Mental status: Unresponsive to voice.     Cranial nerves: There is no papilledema. Pupils 2 mm and sluggish. No response to oculocephalic maneuvers. Weak corneal reflexes.     Motor/Sensory: There is normal bulk and tone.  No movement to stimuli.     Reflexes: 1+ throughout and plantar responses are flexor.    Cerebellar: Cannot be tested.     LABS:                         16.7   32.2  )-----------( 265                   50.8     138  |  97<L>  |  23.0<H>  ----------------------------<  163<H>  6.8<HH>   |  16.0<L>  |  2.67<H>    Ca    6.8<L>      2017 10:54    TPro  7.3  /  Alb  4.9  /  TBili  0.7  /  DBili  x   /  AST  124<H>  /  ALT  135<H>  /  AlkPhos  66  07-14      PT/INR - ( 2017 10:10 )   PT: 11.2 sec;   INR: 1.02 ratio    PTT - ( 2017 10:10 )  PTT:32.4 sec    Toxicology positive for Cocaine and Opiates.     RADIOLOGY & ADDITIONAL STUDIES:  CT head reviewed: there is no acute pathology.

## 2017-07-14 NOTE — ED ADULT NURSE REASSESSMENT NOTE - NS ED NURSE REASSESS COMMENT FT1
pt intubated by dr preston 8.5 tube 27 at the [facility] . respiratory jairo at bedside.

## 2017-07-14 NOTE — ED PROVIDER NOTE - PHYSICAL EXAMINATION
ENT: Airway patent. Nose clear.    Head: Atraumatic.   Eyes: Clear bilaterally. pinpoint  Cardiac: Normal rate.   Respiratory: Rhonchi diffusely   GI: Abdomen soft   Neuro: GCS 5  Skin: Dry, Abrasion to sternum from prehospital sternal rubbing

## 2017-07-14 NOTE — ED PROVIDER NOTE - MEDICAL DECISION MAKING DETAILS
Patient to be transferred to [facility] for MICU and EEG Patient cannot be transferred to [facility] for MICU and EEG at this time due to unstable and need for emergent dialysis.

## 2017-07-14 NOTE — ED PROVIDER NOTE - NOTES
Saw patient in ED and state no EEG at hospital and patient needs to be transferred for further eval. Will see patient for emergent dialysis.

## 2017-07-14 NOTE — H&P ADULT - MENTAL STATUS
Unresponsive, initially having repetitive/rhythmic b/l UE movements as well as clenching jaw and LE twiching, stopped once propofol started

## 2017-07-14 NOTE — CONSULT NOTE ADULT - SUBJECTIVE AND OBJECTIVE BOX
Pt is an approx 29 YOM unknown history who was found unresponsive in an outpatient [facility] building.  Pt was given multiple doses of Narcan via EMS per ED report (note that the ambulance PCR that was scanned for this pt was incorrect) and although respiratory rate increased pt's mental status did not improve and he was intubated in ED for airway protection.  He was treated for hyperkalemia. Pt had been found to have a vial of unknown liquid which may have been urine and as per ED has a h/o IVDA.  Upon my arrival pt was intubated, on no sedation.  His jaw was clenched and he was found to have b/l UE repetitive hand/arm movements possibly c/w myoclonus vs seizure activity

## 2017-07-14 NOTE — ED PROVIDER NOTE - CARE PLAN
Principal Discharge DX:	Acute respiratory failure with hypoxia  Secondary Diagnosis:	Acute renal failure  Secondary Diagnosis:	Hyperkalemia

## 2017-07-14 NOTE — AIRWAY PLACEMENT NOTE ADULT - POST AIRWAY PLACEMENT ASSESSMENT:
skin color improved/breath sounds bilateral/breath sounds equal/positive end tidal CO2 noted/CXR pending/chest excursion noted

## 2017-07-14 NOTE — H&P ADULT - ATTENDING COMMENTS
I personally interviewed and examined the patient, independent of the above practitioner (GAUDENCIO Marshall). I agree with the above, and have updated the appropriate areas of the chart as noted. I spent _60  minutes with the patient and family separate from the above practitioner for my evaluation and interview. The patient is in critical condition and requires ICU care and monitoring. Total Critical Care time spent by attending physician was __60____ minutes, excluding procedure time.     Amita Larson provides me with no information as he is unresponsive and intubated. His real name is Jeremi Garland 1980. The HPI above is the only information we can gather, prior to his event do not have much information. Historically though he has been incarcerated several times, both in the correctional system and [facility] system, and was recently released (1/2017 (?)) but then sent back to [facility] early 7/2017. I personally interviewed and examined the patient, independent of the above practitioner (GAUDENCIO Marshall). I agree with the above, and have updated the appropriate areas of the chart as noted. I spent _60  minutes with the patient and family separate from the above practitioner for my evaluation and interview. The patient is in critical condition and requires ICU care and monitoring. Total Critical Care time spent by attending physician was __60____ minutes, excluding procedure time.     Mr. Kaufman, Critical provides me with no information as he is unresponsive and intubated. His real name is Jeremi Garland 1980. The HPI above is the only information we can gather, prior to his event do not have much information. Historically though he has been incarcerated several times, both in the correctional system and [facility] system, and was recently released (1/2017 (?)) but then sent back to [facility] early 7/2017. It would seem as if he overdosed on some substance and quite possibly had a seizure during or after the ingestion (verbal report from the ED physician noted 'a vial was found next to him'). Initially there was a call to [facility] MICU (Dr. Elizondo) for transfer as they can provide continuos EEGs however they refused based upon the potassium level of 6 (despite improved EKG findings after therapy for potassium = 8). At this time his K is 4.7 with EKG changes on monitor an an ABG @ 1357 [RT provided hard copy, it is not crossing over into [facility], RT has made IT aware]. His exam is limited on the 20mcg/kg/min of propofol, however he does have corneal reflexes and a slight gag, he is over breathing the ventilator. He has a fine tremor in b/l LE, without rigidity, DTRs 2/4 and equal throughout, no inducible clonus, and soft compartments. He has the above abrasions and what appear to be pressure related discolorations on his hands, ankles, and right face (seems as if he was laying down for some time). I find no signs of trauma on him. Is abdominal exam is normal with normal bowel sounds, same as for his cardiac and respiratory exam. Based on exam I cannot define a toxidrome at this time (he remains afebrile as well). His labs are as noted, he does have JONATHAN, probably ATN with some rhabdomyolysis, however is making adequate urine >50mL/hr (after 6 liters of fluid), renal was notified by the ED, however I do not feel that HD is required emergently at this time. His acidosis seems to be metabolic in nature and I presume this will resolve in time as well. He is intubated, patient on ventilator for hypoxia and respiratory failure. Ventilator bundle and lung protective ventilator settings in place. has a negron for his jonathan which should be removed soon; he has urine tox + (cocaine and opioids). We'll repeat labs tonight and manage accordingly. We are getting an EEG and neurology is following.     * Of note, he carries a very violent past with aggression, hearing voice, "having haque that he does not want to talk about because he cannot control them", and a long history of drugs and alcohol since age 12.. please review his old charts in alpha system for more details. He will require a psychiatry consultation once he is more awake as I feel he will likely be transferred to another facility.

## 2017-07-14 NOTE — CONSULT NOTE ADULT - ASSESSMENT
The patient is a 36y Male who was found unresponsive after drug overdose.     Cocaine can certainly provoke seizures.     Would agree with monitoring EEG.  Ativan prn.   If further seizures continue then we may need to start antiepileptic drug.

## 2017-07-14 NOTE — ED PROCEDURE NOTE - CPROC ED INFUS LINE DETAIL1
The location was identified, and the area was draped and prepped./Ultrasound guidance was used during placement./The catheter was placed using sterile technique./The guidewire was recovered.

## 2017-07-14 NOTE — H&P ADULT - PROBLEM SELECTOR PLAN 6
Full ventilatory support  Wean Fi02 to keep sat>92%  Decrease TV to 6cc/kg  Repeat ABG  CXR ETT ok  Wean if mental state improved

## 2017-07-14 NOTE — H&P ADULT - SKIN COMMENTS
b/l dorsal wrist areas of erythema and edema likely due pressure wounds as well as to right check/face

## 2017-07-14 NOTE — ED ADULT TRIAGE NOTE - CHIEF COMPLAINT QUOTE
Pt BIBA from [place], found unresponsive, unknown downtime with pinpoint pupils. Pt was given 4mg Narcan IN and 2mg Narcan IV. Pt diaphoretic upon ED arrival, ashen in color, not tolerating oral secretions. BVM in progress. Code team at bedside stat. Pt BIBA from [place], found unresponsive, unknown downtime with pinpoint pupils. Pt was given 4mg Narcan IN and 2mg Narcan IV. Pt diaphoretic upon ED arrival, ashen in color, not tolerating oral secretions. BVM in progress. Pt arrived with abrasion to mid-sternum secondary to staff at [place] performing sternal rub. Code team at bedside stat. Pt BIBA from [place], found unresponsive, unknown downtime with pinpoint pupils. Pt was given 4mg Narcan IN and 2mg Narcan IV by ems. Pt diaphoretic upon ED arrival, ashen in color, not tolerating oral secretions. BVM in progress. Pt arrived with abrasion to mid-sternum secondary to staff at [place] performing sternal rub. Code team at bedside upon ED arrival.

## 2017-07-14 NOTE — PATIENT PROFILE ADULT. - HOW PATIENT ADDRESSED, PROFILE
pt unresponsive , on propofol, no family present. wearing wrist bracelet from another clinic with name Jeremi Garland : [birth year]

## 2017-07-14 NOTE — PATIENT PROFILE ADULT. - REASON FOR ADMISSION
report received that pt was found next to outpt clinic on [facility], unresponsive, CPR was initiated by bystanders. ? seizure, OD,? psych Dx report received that pt was found next to outpt clinic on [facility], unresponsive, CPR was initiated by bystanders. ? seizure, OD,? psych Dx ? ETOH

## 2017-07-14 NOTE — H&P ADULT - ASSESSMENT
36 YOM with AMS/likely overdose, acute respiratory failure/hypoxia and hypercarbia, acute renal failure likely due rhabdomyolisis

## 2017-07-14 NOTE — ED ADULT NURSE NOTE - CHIEF COMPLAINT QUOTE
Pt BIBA from [place], found unresponsive, unknown downtime with pinpoint pupils. Pt was given 4mg Narcan IN and 2mg Narcan IV by ems. Pt diaphoretic upon ED arrival, ashen in color, not tolerating oral secretions. BVM in progress. Pt arrived with abrasion to mid-sternum secondary to staff at [place] performing sternal rub. Code team at bedside upon ED arrival.

## 2017-07-14 NOTE — ED ADULT NURSE NOTE - OBJECTIVE STATEMENT
28 y/o male presents to ed from [facility] after being found unresponsive. was given 6 mg narcan in transport with minimal success. patient arrives with assisted ventilations, redness to chest from prehospital sternal rub. patient vital signs difficult to obtain. dr preston and code team to bedside. patient unresponsive.

## 2017-07-14 NOTE — H&P ADULT - PROBLEM SELECTOR PLAN 5
Renal evaluation  Treated for hyperkalemia which is resolving with conservative therapy  Hold off on HD for now

## 2017-07-14 NOTE — CHART NOTE - NSCHARTNOTEFT_GEN_A_CORE
spoke with Jennifer corona [phone] identifies her self as his sister; he has no family.    stated that when he wakes up he will be crazy and delusional. she wants him to get help if he survives and she will come see him this evening.

## 2017-07-14 NOTE — H&P ADULT - PROBLEM SELECTOR PLAN 1
Likely due to drug overdose  Initial head CT negative if mental status doesn't improve will need repeat CT vs MRI  R/O seizure as below

## 2017-07-14 NOTE — EEG REPORT - NS EEG TEXT BOX
TECH INFORMATION:   This is a Routine EEG.     Placement and Labeling of Electrodes: The EEG was performed utilizing at least 20 channel referential EEG connections (coronal over temporal over parasagittal montage) with inferior temporal electrodes when indicting and using all standard 10-20 electrode placements with EKG, with additional electrodes placed in the inferior temporal region using the modified 10-10 montage electrode placements for elective admissions, or if deemed necessary.  Recording was at  a sampling rate of 256 samples per second per channel. Time synchronized digital video recording was done simultaneously with EEG recording.  A low light infrared camera was used for low light recording..    History:  This is a 53 year old Male patient with a history of mental status changes.    DESCRIPTION OF RECORDING:   The recording is generally poorly organized.     No posterior dominant rhythm is present.     The background consists of generalized polymorphic asynchronous theta and delta which is generally medium in amplitude.     Some superimposed faster frequencies are present.     No normal features of wakefulness, drowsiness or sleep are present.     Hyperventilation was not performed.    EVENTS:   ·  There were no push button events: .  ·  Compressed Spectral Array (CSA) data was reviewed and correlated with the electrographic findings detailed above.  Jostin and seizure detection were reviewed and there were no additional abnormalities. CSA showed a variable spectral pattern. Areas of increased power in particular were reviewed in details, and compared with the raw EEG data. Areas of abrupt increase in spectral power were reviewed to exclude seizures, and were determined to be artifactual in nature.  ·  The relative ratio of the power of delta range frequencies and faster frequencies remained stable over the course of the study. There was no definitive increase in the relative power in the delta frequency spectrum apparent in the left hemisphere versus the right the hemisphere..      EEG CLASSIFICATION:   Findings: Abnormal.     Generalized background slowing polymorphic theta and delta.    INTERPRETATION:   No seizure sequences occurred.     This is an abnormal EEG indicating moderate diffuse cerebral dysfunction.

## 2017-07-14 NOTE — ED ADULT NURSE REASSESSMENT NOTE - NS ED NURSE REASSESS COMMENT FT1
vent settings ac 16/450/5/100% vent settings ac 16/450/5/100%. 1 ml push dose epi given by dr flores

## 2017-07-15 DIAGNOSIS — T50.901A POISONING BY UNSPECIFIED DRUGS, MEDICAMENTS AND BIOLOGICAL SUBSTANCES, ACCIDENTAL (UNINTENTIONAL), INITIAL ENCOUNTER: ICD-10-CM

## 2017-07-15 DIAGNOSIS — F20.0 PARANOID SCHIZOPHRENIA: ICD-10-CM

## 2017-07-15 DIAGNOSIS — F19.10 OTHER PSYCHOACTIVE SUBSTANCE ABUSE, UNCOMPLICATED: ICD-10-CM

## 2017-07-15 LAB
ANION GAP SERPL CALC-SCNC: 10 MMOL/L — SIGNIFICANT CHANGE UP (ref 5–17)
BUN SERPL-MCNC: 15 MG/DL — SIGNIFICANT CHANGE UP (ref 8–20)
CALCIUM SERPL-MCNC: 7.5 MG/DL — LOW (ref 8.6–10.2)
CHLORIDE SERPL-SCNC: 103 MMOL/L — SIGNIFICANT CHANGE UP (ref 98–107)
CK MB CFR SERPL CALC: 56.8 NG/ML — HIGH (ref 0–6.7)
CK SERPL-CCNC: 6134 U/L — HIGH (ref 30–200)
CO2 SERPL-SCNC: 29 MMOL/L — SIGNIFICANT CHANGE UP (ref 22–29)
CREAT SERPL-MCNC: 1.13 MG/DL — SIGNIFICANT CHANGE UP (ref 0.5–1.3)
CULTURE RESULTS: NO GROWTH — SIGNIFICANT CHANGE UP
GLUCOSE SERPL-MCNC: 96 MG/DL — SIGNIFICANT CHANGE UP (ref 70–115)
HCT VFR BLD CALC: 38.7 % — LOW (ref 42–52)
HGB BLD-MCNC: 13 G/DL — LOW (ref 14–18)
MAGNESIUM SERPL-MCNC: 2.3 MG/DL — SIGNIFICANT CHANGE UP (ref 1.6–2.6)
MCHC RBC-ENTMCNC: 33.3 PG — HIGH (ref 27–31)
MCHC RBC-ENTMCNC: 33.6 G/DL — SIGNIFICANT CHANGE UP (ref 32–36)
MCV RBC AUTO: 99.2 FL — HIGH (ref 80–94)
PHOSPHATE SERPL-MCNC: 1.8 MG/DL — LOW (ref 2.4–4.7)
PLATELET # BLD AUTO: 81 K/UL — LOW (ref 150–400)
POTASSIUM SERPL-MCNC: 4.5 MMOL/L — SIGNIFICANT CHANGE UP (ref 3.5–5.3)
POTASSIUM SERPL-SCNC: 4.5 MMOL/L — SIGNIFICANT CHANGE UP (ref 3.5–5.3)
RBC # BLD: 3.9 M/UL — LOW (ref 4.6–6.2)
RBC # FLD: 15.3 % — SIGNIFICANT CHANGE UP (ref 11–15.6)
SODIUM SERPL-SCNC: 142 MMOL/L — SIGNIFICANT CHANGE UP (ref 135–145)
SPECIMEN SOURCE: SIGNIFICANT CHANGE UP
WBC # BLD: 15 K/UL — HIGH (ref 4.8–10.8)
WBC # FLD AUTO: 15 K/UL — HIGH (ref 4.8–10.8)

## 2017-07-15 PROCEDURE — 99232 SBSQ HOSP IP/OBS MODERATE 35: CPT

## 2017-07-15 PROCEDURE — 95951: CPT | Mod: 26

## 2017-07-15 PROCEDURE — 99233 SBSQ HOSP IP/OBS HIGH 50: CPT

## 2017-07-15 PROCEDURE — 99222 1ST HOSP IP/OBS MODERATE 55: CPT

## 2017-07-15 RX ORDER — SODIUM CHLORIDE 9 MG/ML
1000 INJECTION, SOLUTION INTRAVENOUS
Qty: 0 | Refills: 0 | Status: DISCONTINUED | OUTPATIENT
Start: 2017-07-15 | End: 2017-07-16

## 2017-07-15 RX ORDER — KETOROLAC TROMETHAMINE 30 MG/ML
15 SYRINGE (ML) INJECTION EVERY 8 HOURS
Qty: 0 | Refills: 0 | Status: DISCONTINUED | OUTPATIENT
Start: 2017-07-15 | End: 2017-07-18

## 2017-07-15 RX ORDER — OLANZAPINE 15 MG/1
20 TABLET, FILM COATED ORAL AT BEDTIME
Qty: 0 | Refills: 0 | Status: DISCONTINUED | OUTPATIENT
Start: 2017-07-15 | End: 2017-07-21

## 2017-07-15 RX ORDER — ACETAMINOPHEN 500 MG
650 TABLET ORAL EVERY 6 HOURS
Qty: 0 | Refills: 0 | Status: DISCONTINUED | OUTPATIENT
Start: 2017-07-15 | End: 2017-07-21

## 2017-07-15 RX ORDER — OLANZAPINE 15 MG/1
10 TABLET, FILM COATED ORAL EVERY 4 HOURS
Qty: 0 | Refills: 0 | Status: DISCONTINUED | OUTPATIENT
Start: 2017-07-15 | End: 2017-07-21

## 2017-07-15 RX ORDER — OLANZAPINE 15 MG/1
20 TABLET, FILM COATED ORAL
Qty: 0 | Refills: 0 | Status: DISCONTINUED | OUTPATIENT
Start: 2017-07-15 | End: 2017-07-15

## 2017-07-15 RX ADMIN — SODIUM CHLORIDE 200 MILLILITER(S): 9 INJECTION, SOLUTION INTRAVENOUS at 06:29

## 2017-07-15 RX ADMIN — PROPOFOL 4.94 MICROGRAM(S)/KG/MIN: 10 INJECTION, EMULSION INTRAVENOUS at 06:29

## 2017-07-15 RX ADMIN — ENOXAPARIN SODIUM 30 MILLIGRAM(S): 100 INJECTION SUBCUTANEOUS at 16:58

## 2017-07-15 RX ADMIN — OLANZAPINE 10 MILLIGRAM(S): 15 TABLET, FILM COATED ORAL at 15:04

## 2017-07-15 RX ADMIN — Medication 650 MILLIGRAM(S): at 23:33

## 2017-07-15 RX ADMIN — SODIUM CHLORIDE 150 MILLILITER(S): 9 INJECTION, SOLUTION INTRAVENOUS at 10:00

## 2017-07-15 RX ADMIN — Medication 650 MILLIGRAM(S): at 22:08

## 2017-07-15 RX ADMIN — Medication 650 MILLIGRAM(S): at 14:59

## 2017-07-15 RX ADMIN — SODIUM CHLORIDE 150 MILLILITER(S): 9 INJECTION, SOLUTION INTRAVENOUS at 22:11

## 2017-07-15 RX ADMIN — SODIUM CHLORIDE 200 MILLILITER(S): 9 INJECTION, SOLUTION INTRAVENOUS at 02:17

## 2017-07-15 RX ADMIN — OLANZAPINE 20 MILLIGRAM(S): 15 TABLET, FILM COATED ORAL at 22:05

## 2017-07-15 RX ADMIN — Medication 15 MILLIGRAM(S): at 16:57

## 2017-07-15 RX ADMIN — PROPOFOL 4.94 MICROGRAM(S)/KG/MIN: 10 INJECTION, EMULSION INTRAVENOUS at 02:43

## 2017-07-15 RX ADMIN — Medication 15 MILLIGRAM(S): at 17:15

## 2017-07-15 RX ADMIN — CHLORHEXIDINE GLUCONATE 15 MILLILITER(S): 213 SOLUTION TOPICAL at 06:29

## 2017-07-15 RX ADMIN — PANTOPRAZOLE SODIUM 40 MILLIGRAM(S): 20 TABLET, DELAYED RELEASE ORAL at 12:01

## 2017-07-15 RX ADMIN — Medication 650 MILLIGRAM(S): at 17:00

## 2017-07-15 NOTE — BEHAVIORAL HEALTH ASSESSMENT NOTE - OTHER PAST PSYCHIATRIC HISTORY (INCLUDE DETAILS REGARDING ONSET, COURSE OF ILLNESS, INPATIENT/OUTPATIENT TREATMENT)
Schizophrenia (Hallucinations of grandeur) - Zyprexa; Aggression; Polysubstance abuse  on AOT upon release from residential 1/2017  extensive history see core history and AOT order in alpha chart 7/03/2017  admitted to [facility] 7/3/17 -7/13/17  [facility] ACT TEAM

## 2017-07-15 NOTE — EEG REPORT - NS EEG TEXT BOX
History:  This is a 53 year old Male patient with a history of mental status changes.    DESCRIPTION OF RECORDING:   The recording is generally poorly organized.   No posterior dominant rhythm is present with more diffuse 7-8hz activity in max wakefulness.   The background consists of generalized polymorphic asynchronous theta and delta which is generally medium in amplitude. Increased 2hz delta with stimulation and arousal noted.  Some superimposed faster frequencies are present.     Normal sleep transients not recorded.    Hyperventilation was not performed.  Increased electrode artfacts overnight limiting early AM interpretation      EVENTS:   Two push button events: no EEG abnormalities associated.      ·  Compressed Spectral Array (CSA) data was reviewed and correlated with the electrographic findings detailed above.  Jostin and seizure detection were reviewed and there were no additional abnormalities. CSA showed a variable spectral pattern. Areas of increased power in particular were reviewed in details, and compared with the raw EEG data. Areas of abrupt increase in spectral power were reviewed to exclude seizures, and were determined to be artifactual in nature.  ·  The relative ratio of the power of delta range frequencies and faster frequencies remained stable over the course of the study. There was no definitive increase in the relative power in the delta frequency spectrum apparent in the left hemisphere versus the right the hemisphere..      EEG CLASSIFICATION:   Findings: Abnormal.   Generalized background slowing polymorphic theta and delta.      INTERPRETATION:   No seizure sequences occurred.   This is an abnormal EEG indicating moderate diffuse cerebral dysfunction. History:  This is a 53 year old Male patient with a history of mental status changes.    DESCRIPTION OF RECORDING:   The recording is generally poorly organized.   No posterior dominant rhythm is present with more diffuse 7-8hz activity in max wakefulness.   The background consists of generalized polymorphic asynchronous theta and delta which is generally medium in amplitude. Increased 2hz delta with stimulation and arousal noted.  Some superimposed faster frequencies are present.     Normal sleep transients not recorded.    Hyperventilation was not performed.  Increased electrode artfacts overnight limiting early AM interpretation      EVENTS:   Several push button events per staff: no EEG abnormalities associated.      ·  Compressed Spectral Array (CSA) data was reviewed and correlated with the electrographic findings detailed above.  Jostin and seizure detection were reviewed and there were no additional abnormalities. CSA showed a variable spectral pattern. Areas of increased power in particular were reviewed in details, and compared with the raw EEG data. Areas of abrupt increase in spectral power were reviewed to exclude seizures, and were determined to be artifactual in nature.  ·  The relative ratio of the power of delta range frequencies and faster frequencies remained stable over the course of the study. There was no definitive increase in the relative power in the delta frequency spectrum apparent in the left hemisphere versus the right the hemisphere..      EEG CLASSIFICATION:   Findings: Abnormal.   Generalized background slowing polymorphic theta and delta.      INTERPRETATION:   No seizure sequences occurred.   This is an abnormal EEG indicating moderate diffuse cerebral dysfunction.

## 2017-07-15 NOTE — CONSULT NOTE ADULT - ATTENDING COMMENTS
Stat HD, IV fluid , Meds
I have read, reviewed, edited and approve of note above.  Most of the complaints regarding the R hand are about the large blister that is on his ulnar edge of his hand.  With it drained he should improve.  Agree c/ local dressing care.  Can f/u as outpt for wound check.  Con't current management of rhabdo and psychosocial issues.

## 2017-07-15 NOTE — CONSULT NOTE ADULT - ASSESSMENT
37 YO M with AMS/likely overdose, acute respiratory failure/hypoxia and hypercarbia, acute renal failure likely due rhabdomyolisis. Surgery called to r/o compartment syndrome of RUE  -No signs of compartment syndrome  -Hand bullae drained using 18 gauge needle, dressed with bacitracin, dressing orders.  -Will sign off

## 2017-07-15 NOTE — BEHAVIORAL HEALTH ASSESSMENT NOTE - OTHER
ICU Doctor and Crisis Center employee At facility Information from Crisis center H/o psychosis Agitated Not assessed Not assessed; In bed Lethargic Limited responses to questions one two word responses

## 2017-07-15 NOTE — CONSULT NOTE ADULT - SUBJECTIVE AND OBJECTIVE BOX
HPI:  Pt is an approx 36 YOM h/o schizophrenia, drug abuse who was found unresponsive in an outpt [place] facility.  As per ED pt was given multiple doses of Narcan at facility and by EMS, upon arrival in ED pt's respiratory rate improved but his mental status did not so he was intubated for airway protection.  He was treated for profound hyperkalemia with EKG changes which have now improved.  He is unresponsive at present and having jaw clenching and rhythmic and repetitive b/l upper extremities movements.  Pt given propofol.  Attempted to transfer to [place] for continuous EEG monitoring, which is difficult to obtain in this facility, but transfer was refused.  As per Dr DE LOS SANTOS in ED he spoke with pt's sister, he has h/o paranoid/delusional schizophrenia and refuses to be compliant with his meds so she has not seen him because she feels he is too unstable to be around her family. (2017 13:42)    Surgery called to evaluate to r/o compartment syndrome    PAST MEDICAL & SURGICAL HISTORY:  Drug use  Paranoid schizophrenia  No significant past surgical history      REVIEW OF SYSTEMS: all systems reviewed, negative except as above    MEDICATIONS  (STANDING):  enoxaparin Injectable 30 milliGRAM(s) SubCutaneous every 24 hours  multiple electrolytes Injection Type 1 1000 milliLiter(s) (150 mL/Hr) IV Continuous <Continuous>  OLANZapine 20 milliGRAM(s) Oral at bedtime    MEDICATIONS  (PRN):  OLANZapine Disintegrating Tablet 10 milliGRAM(s) Oral every 4 hours PRN agitation  acetaminophen   Tablet. 650 milliGRAM(s) Oral every 6 hours PRN Mild Pain (1 - 3)  ketorolac   Injectable 15 milliGRAM(s) IV Push every 8 hours PRN Moderate Pain (4 - 6)      Allergies    Allergy Status Unknown    Intolerances    FAMILY HISTORY:  No pertinent family history in first degree relatives      Vital Signs Last 24 Hrs  T(C): 35.7 (15 Jul 2017 16:00), Max: 37.5 (2017 20:00)  T(F): 96.2 (15 Jul 2017 16:00), Max: 99.5 (2017 20:00)  HR: 78 (15 Jul 2017 16:00) (66 - 90)  BP: 142/80 (15 Jul 2017 16:00) (115/74 - 142/80)  BP(mean): 86 (15 Jul 2017 07:00) (86 - 98)  RR: 20 (15 Jul 2017 16:00) (5 - 23)  SpO2: 98% (15 Jul 2017 12:00) (96% - 99%)    General: No acute distress.  Alert, oriented, interactive. Follows simple instructions    HEENT: Atraumatic, MMM, Pupils equal, reactive to light.  Symmetric.     PULM: Clear to auscultation bilaterally, no significant sputum production    CVS: Regular rate and rhythm, no murmurs, rubs; S1/S2. No JVD/HJR    ABD: Soft, nondistended, nontender, normoactive bowel sounds, no masses    EXT: No edema, nontender. Distal pulses 2+ equal bilaterally. 4x5cm blisters to right hand, dorsal and ventral aspects, left wrist 3x4, bilateral ankles various sizes (consistent with crossed extremities and laying on them), compartments soft, palpable pulses.    SKIN: Warm and well perfused, no rashes noted.    Neuro: nonfocal, moves all extremities.     LABS:                        13.0   15.0  )-----------( 81       ( 15 Jul 2017 07:23 )             38.7     07-15    142  |  103  |  15.0  ----------------------------<  96  4.5   |  29.0  |  1.13    Ca    7.5<L>      15 Jul 2017 07:23  Phos  1.8     07-15  Mg     2.3     07-15    TPro  7.3  /  Alb  4.9  /  TBili  0.7  /  DBili  x   /  AST  124<H>  /  ALT  135<H>  /  AlkPhos  66  07-14    PT/INR - ( 2017 10:10 )   PT: 11.2 sec;   INR: 1.02 ratio         PTT - ( 2017 10:10 )  PTT:32.4 sec  Urinalysis Basic - ( 2017 10:28 )    Color: Yellow / Appearance: Clear / S.025 / pH: x  Gluc: x / Ketone: Trace  / Bili: Negative / Urobili: Negative mg/dL   Blood: x / Protein: 100 mg/dL / Nitrite: Negative   Leuk Esterase: Negative / RBC: 3-5 /HPF / WBC 0-2   Sq Epi: x / Non Sq Epi: x / Bacteria: x        RADIOLOGY & ADDITIONAL STUDIES: HPI:  Pt is an approx 36 YOM h/o schizophrenia, drug abuse who was found unresponsive in an outpt [place] facility.  As per ED pt was given multiple doses of Narcan at facility and by EMS, upon arrival in ED pt's respiratory rate improved but his mental status did not so he was intubated for airway protection.  He was treated for profound hyperkalemia with EKG changes which have now improved.  He is unresponsive at present and having jaw clenching and rhythmic and repetitive b/l upper extremities movements.  Pt given propofol.  Attempted to transfer to [place] for continuous EEG monitoring, which is difficult to obtain in this facility, but transfer was refused.  As per Dr DE LOS SANTOS in ED he spoke with pt's sister, he has h/o paranoid/delusional schizophrenia and refuses to be compliant with his meds so she has not seen him because she feels he is too unstable to be around her family. (2017 13:42)    Surgery called to evaluate to r/o compartment syndrome    PAST MEDICAL & SURGICAL HISTORY:  Drug use  Paranoid schizophrenia  No significant past surgical history      REVIEW OF SYSTEMS: all systems reviewed, negative except as above    MEDICATIONS  (STANDING):  enoxaparin Injectable 30 milliGRAM(s) SubCutaneous every 24 hours  multiple electrolytes Injection Type 1 1000 milliLiter(s) (150 mL/Hr) IV Continuous <Continuous>  OLANZapine 20 milliGRAM(s) Oral at bedtime    MEDICATIONS  (PRN):  OLANZapine Disintegrating Tablet 10 milliGRAM(s) Oral every 4 hours PRN agitation  acetaminophen   Tablet. 650 milliGRAM(s) Oral every 6 hours PRN Mild Pain (1 - 3)  ketorolac   Injectable 15 milliGRAM(s) IV Push every 8 hours PRN Moderate Pain (4 - 6)      Allergies    Allergy Status Unknown    Intolerances    FAMILY HISTORY:  No pertinent family history in first degree relatives      Vital Signs Last 24 Hrs  T(C): 35.7 (15 Jul 2017 16:00), Max: 37.5 (2017 20:00)  T(F): 96.2 (15 Jul 2017 16:00), Max: 99.5 (2017 20:00)  HR: 78 (15 Jul 2017 16:00) (66 - 90)  BP: 142/80 (15 Jul 2017 16:00) (115/74 - 142/80)  BP(mean): 86 (15 Jul 2017 07:00) (86 - 98)  RR: 20 (15 Jul 2017 16:00) (5 - 23)  SpO2: 98% (15 Jul 2017 12:00) (96% - 99%)    General: No acute distress.  Alert, oriented, interactive. Follows simple instructions    HEENT: PEERL, R side of face swollen, no crepitus.  Dark bruise along the R side of face from zygomatic arch to jawline.     PULM: Clear to auscultation bilaterally, no significant sputum production    CVS: Regular rate and rhythm, no murmurs, rubs; S1/S2. No JVD/HJR    ABD: Soft, nondistended, nontender, normoactive bowel sounds, no masses    EXT: No edema, nontender. Distal pulses 2+ equal bilaterally. 4x5cm blisters to right hand, dorsal and ventral aspects, left wrist 3x4, bilateral ankles various sizes (consistent with crossed extremities and laying on them), compartments soft, palpable pulses.  Normal thumb apposition, normal hand movement c/ discomfort due to swelling.  Normal sensation.      SKIN: Warm and well perfused, no rashes noted.    Neuro: nonfocal, moves all extremities.     LABS:                        13.0   15.0  )-----------( 81       ( 15 Jul 2017 07:23 )             38.7     07-15    142  |  103  |  15.0  ----------------------------<  96  4.5   |  29.0  |  1.13    Ca    7.5<L>      15 Jul 2017 07:23  Phos  1.8     07-15  Mg     2.3     07-15    TPro  7.3  /  Alb  4.9  /  TBili  0.7  /  DBili  x   /  AST  124<H>  /  ALT  135<H>  /  AlkPhos  66  07-14    PT/INR - ( 2017 10:10 )   PT: 11.2 sec;   INR: 1.02 ratio         PTT - ( 2017 10:10 )  PTT:32.4 sec  Urinalysis Basic - ( 2017 10:28 )    Color: Yellow / Appearance: Clear / S.025 / pH: x  Gluc: x / Ketone: Trace  / Bili: Negative / Urobili: Negative mg/dL   Blood: x / Protein: 100 mg/dL / Nitrite: Negative   Leuk Esterase: Negative / RBC: 3-5 /HPF / WBC 0-2   Sq Epi: x / Non Sq Epi: x / Bacteria: x

## 2017-07-15 NOTE — BEHAVIORAL HEALTH ASSESSMENT NOTE - NSBHCHARTREVIEWINVESTIGATE_PSY_A_CORE FT
< from: 12 Lead ECG (07.14.17 @ 10:18) >    Ventricular Rate 94 BPM    Atrial Rate 94 BPM    P-R Interval 148 ms    QRS Duration 106 ms    Q-T Interval 386 ms    QTC Calculation(Bezet) 482 ms    P Axis 59 degrees    R Axis -32 degrees    T Axis 65 degrees    Diagnosis Line Normal sinus rhythm  Left axis deviation  Incomplete right bundle branch block  Possible Lateral infarct , age undetermined  Abnormal ECG    < end of copied text >

## 2017-07-15 NOTE — BEHAVIORAL HEALTH ASSESSMENT NOTE - SUMMARY
Pt is a 38 y/o M w/ PPH of schizophrenia, polysubstance abuse, and extensive legal hx resides at [facility] under AOT who ingested optiates and cocaine w/in 24 hours of being discharged from [facility] (July 13th) currently under ICU care for medical stability

## 2017-07-15 NOTE — PROGRESS NOTE ADULT - PROBLEM SELECTOR PLAN 2
- continued with IV fluids  - trend CPKs  - urine output > 100mL/hr  - d/c negron  - hyperkalemia resolved   - trend Scr, I presume it will improve more so

## 2017-07-15 NOTE — BEHAVIORAL HEALTH ASSESSMENT NOTE - DETAILS
[facility] discharged on July 13th; [place] 5/2016; CPEP Arrested for burgerly and entering potentially w/ assault

## 2017-07-15 NOTE — BEHAVIORAL HEALTH ASSESSMENT NOTE - HPI (INCLUDE ILLNESS QUALITY, SEVERITY, DURATION, TIMING, CONTEXT, MODIFYING FACTORS, ASSOCIATED SIGNS AND SYMPTOMS)
Pt is a 36 y/o M w/ PPH of schizophrenia (Multiple psychiatric admissions throughout the years), Non-compliance w/ medication, polysubstance abuse, aggression, and legal history w/ multiple arrests and incarcerated total of at least 5 years. He was brought in after [facility] (Where the patient resides [facility]) called ambulance after finding patient in prone position, breathing, non-responsive lying next to empty vial. Pt discharged from [facility] on July 13th and arrived back to crisis center around 1pm same day - as per crisis center employee, the patient seemed fine and was found during morning rounds the morning after (July 14th) on the floor. No additional information known in regards to precipitating factors, reports of suicidal ideations/intent, or knowledge of what the patient ingested.

## 2017-07-16 PROCEDURE — 99233 SBSQ HOSP IP/OBS HIGH 50: CPT

## 2017-07-16 RX ORDER — SODIUM CHLORIDE 9 MG/ML
1000 INJECTION, SOLUTION INTRAVENOUS
Qty: 0 | Refills: 0 | Status: DISCONTINUED | OUTPATIENT
Start: 2017-07-16 | End: 2017-07-21

## 2017-07-16 RX ADMIN — Medication 15 MILLIGRAM(S): at 06:34

## 2017-07-16 RX ADMIN — Medication 15 MILLIGRAM(S): at 08:00

## 2017-07-16 RX ADMIN — ENOXAPARIN SODIUM 30 MILLIGRAM(S): 100 INJECTION SUBCUTANEOUS at 16:53

## 2017-07-16 RX ADMIN — Medication 15 MILLIGRAM(S): at 21:20

## 2017-07-16 RX ADMIN — SODIUM CHLORIDE 150 MILLILITER(S): 9 INJECTION, SOLUTION INTRAVENOUS at 12:53

## 2017-07-16 RX ADMIN — OLANZAPINE 20 MILLIGRAM(S): 15 TABLET, FILM COATED ORAL at 21:20

## 2017-07-16 RX ADMIN — OLANZAPINE 10 MILLIGRAM(S): 15 TABLET, FILM COATED ORAL at 16:55

## 2017-07-16 RX ADMIN — Medication 15 MILLIGRAM(S): at 21:35

## 2017-07-16 RX ADMIN — SODIUM CHLORIDE 125 MILLILITER(S): 9 INJECTION, SOLUTION INTRAVENOUS at 16:55

## 2017-07-17 DIAGNOSIS — S60.521S: ICD-10-CM

## 2017-07-17 DIAGNOSIS — R23.8 OTHER SKIN CHANGES: ICD-10-CM

## 2017-07-17 PROCEDURE — 99233 SBSQ HOSP IP/OBS HIGH 50: CPT

## 2017-07-17 RX ORDER — KETOROLAC TROMETHAMINE 30 MG/ML
15 SYRINGE (ML) INJECTION ONCE
Qty: 0 | Refills: 0 | Status: DISCONTINUED | OUTPATIENT
Start: 2017-07-17 | End: 2017-07-17

## 2017-07-17 RX ADMIN — Medication 650 MILLIGRAM(S): at 02:30

## 2017-07-17 RX ADMIN — Medication 15 MILLIGRAM(S): at 03:00

## 2017-07-17 RX ADMIN — Medication 15 MILLIGRAM(S): at 20:21

## 2017-07-17 RX ADMIN — ENOXAPARIN SODIUM 30 MILLIGRAM(S): 100 INJECTION SUBCUTANEOUS at 17:30

## 2017-07-17 RX ADMIN — OLANZAPINE 10 MILLIGRAM(S): 15 TABLET, FILM COATED ORAL at 01:31

## 2017-07-17 RX ADMIN — OLANZAPINE 20 MILLIGRAM(S): 15 TABLET, FILM COATED ORAL at 21:34

## 2017-07-17 RX ADMIN — Medication 650 MILLIGRAM(S): at 01:30

## 2017-07-17 RX ADMIN — Medication 15 MILLIGRAM(S): at 02:45

## 2017-07-17 RX ADMIN — Medication 15 MILLIGRAM(S): at 20:06

## 2017-07-18 LAB
ANION GAP SERPL CALC-SCNC: 14 MMOL/L — SIGNIFICANT CHANGE UP (ref 5–17)
BASOPHILS # BLD AUTO: 0 K/UL — SIGNIFICANT CHANGE UP (ref 0–0.2)
BASOPHILS NFR BLD AUTO: 0.2 % — SIGNIFICANT CHANGE UP (ref 0–2)
BUN SERPL-MCNC: 11 MG/DL — SIGNIFICANT CHANGE UP (ref 8–20)
CALCIUM SERPL-MCNC: 8.5 MG/DL — LOW (ref 8.6–10.2)
CHLORIDE SERPL-SCNC: 104 MMOL/L — SIGNIFICANT CHANGE UP (ref 98–107)
CK MB CFR SERPL CALC: 5.3 NG/ML — SIGNIFICANT CHANGE UP (ref 0–6.7)
CK SERPL-CCNC: 2334 U/L — HIGH (ref 30–200)
CO2 SERPL-SCNC: 25 MMOL/L — SIGNIFICANT CHANGE UP (ref 22–29)
CREAT SERPL-MCNC: 0.85 MG/DL — SIGNIFICANT CHANGE UP (ref 0.5–1.3)
EOSINOPHIL # BLD AUTO: 0.1 K/UL — SIGNIFICANT CHANGE UP (ref 0–0.5)
EOSINOPHIL NFR BLD AUTO: 1.4 % — SIGNIFICANT CHANGE UP (ref 0–5)
GLUCOSE SERPL-MCNC: 105 MG/DL — SIGNIFICANT CHANGE UP (ref 70–115)
HCT VFR BLD CALC: 35.9 % — LOW (ref 42–52)
HGB BLD-MCNC: 12.6 G/DL — LOW (ref 14–18)
LYMPHOCYTES # BLD AUTO: 1.4 K/UL — SIGNIFICANT CHANGE UP (ref 1–4.8)
LYMPHOCYTES # BLD AUTO: 15.2 % — LOW (ref 20–55)
MAGNESIUM SERPL-MCNC: 1.9 MG/DL — SIGNIFICANT CHANGE UP (ref 1.6–2.6)
MCHC RBC-ENTMCNC: 33.3 PG — HIGH (ref 27–31)
MCHC RBC-ENTMCNC: 35.1 G/DL — SIGNIFICANT CHANGE UP (ref 32–36)
MCV RBC AUTO: 95 FL — HIGH (ref 80–94)
MONOCYTES # BLD AUTO: 0.7 K/UL — SIGNIFICANT CHANGE UP (ref 0–0.8)
MONOCYTES NFR BLD AUTO: 7.4 % — SIGNIFICANT CHANGE UP (ref 3–10)
NEUTROPHILS # BLD AUTO: 7.2 K/UL — SIGNIFICANT CHANGE UP (ref 1.8–8)
NEUTROPHILS NFR BLD AUTO: 75.6 % — HIGH (ref 37–73)
PHOSPHATE SERPL-MCNC: 3.3 MG/DL — SIGNIFICANT CHANGE UP (ref 2.4–4.7)
PLATELET # BLD AUTO: 190 K/UL — SIGNIFICANT CHANGE UP (ref 150–400)
POTASSIUM SERPL-MCNC: 3.8 MMOL/L — SIGNIFICANT CHANGE UP (ref 3.5–5.3)
POTASSIUM SERPL-SCNC: 3.8 MMOL/L — SIGNIFICANT CHANGE UP (ref 3.5–5.3)
RBC # BLD: 3.78 M/UL — LOW (ref 4.6–6.2)
RBC # FLD: 14.1 % — SIGNIFICANT CHANGE UP (ref 11–15.6)
SODIUM SERPL-SCNC: 143 MMOL/L — SIGNIFICANT CHANGE UP (ref 135–145)
WBC # BLD: 9.6 K/UL — SIGNIFICANT CHANGE UP (ref 4.8–10.8)
WBC # FLD AUTO: 9.6 K/UL — SIGNIFICANT CHANGE UP (ref 4.8–10.8)

## 2017-07-18 PROCEDURE — 93971 EXTREMITY STUDY: CPT | Mod: 26,RT

## 2017-07-18 PROCEDURE — 73130 X-RAY EXAM OF HAND: CPT | Mod: 26,RT

## 2017-07-18 PROCEDURE — 99233 SBSQ HOSP IP/OBS HIGH 50: CPT

## 2017-07-18 RX ORDER — ENOXAPARIN SODIUM 100 MG/ML
40 INJECTION SUBCUTANEOUS DAILY
Qty: 0 | Refills: 0 | Status: DISCONTINUED | OUTPATIENT
Start: 2017-07-18 | End: 2017-07-21

## 2017-07-18 RX ADMIN — Medication 15 MILLIGRAM(S): at 09:50

## 2017-07-18 RX ADMIN — Medication 15 MILLIGRAM(S): at 09:35

## 2017-07-18 RX ADMIN — OLANZAPINE 20 MILLIGRAM(S): 15 TABLET, FILM COATED ORAL at 21:26

## 2017-07-18 RX ADMIN — SODIUM CHLORIDE 125 MILLILITER(S): 9 INJECTION, SOLUTION INTRAVENOUS at 21:27

## 2017-07-18 NOTE — CHART NOTE - NSCHARTNOTEFT_GEN_A_CORE
Pt c/o right hand edema. pt c/o tingling sensation. pt denies any pain or discomfort. Pt had bullae right hand rained by surgery 7/17  VSS NAD Pt sleeping  Right Hand open wounds from bullae. clean dry  Right hand edema  right hand FROM + sensory + motor warm dry  + capillary refill + puses Neurovascular intact nontender  creatine kinase stat  duplex doppler right upper extremity  xray right hand  continue to monitor  will sign off to hospitalist

## 2017-07-19 ENCOUNTER — TRANSCRIPTION ENCOUNTER (OUTPATIENT)
Age: 37
End: 2017-07-19

## 2017-07-19 LAB
CULTURE RESULTS: SIGNIFICANT CHANGE UP
CULTURE RESULTS: SIGNIFICANT CHANGE UP
SPECIMEN SOURCE: SIGNIFICANT CHANGE UP
SPECIMEN SOURCE: SIGNIFICANT CHANGE UP

## 2017-07-19 PROCEDURE — 99233 SBSQ HOSP IP/OBS HIGH 50: CPT

## 2017-07-19 RX ADMIN — OLANZAPINE 20 MILLIGRAM(S): 15 TABLET, FILM COATED ORAL at 23:31

## 2017-07-19 RX ADMIN — OLANZAPINE 10 MILLIGRAM(S): 15 TABLET, FILM COATED ORAL at 17:48

## 2017-07-19 NOTE — DISCHARGE NOTE ADULT - HOSPITAL COURSE
29 year old Male extensive psychiatric history, rhabdomyolysis, overdose      Problem/Plan - 1:  ·  Problem: Drug overdose, accidental or unintentional, initial encounter.  Plan: He will need in patient rehab.     Problem/Plan - 2:  ·  Problem: Non-traumatic rhabdomyolysis.  Plan: Resolving  repeat cpk, July 17, patient now off iv fluids.     Problem/Plan - 3:  ·  Problem: Schizophrenia, paranoid, chronic.  Plan: Follow up with Psychiatry.     Problem/Plan - 4:  ·  Problem: Hand blister, right, sequela.  Plan: Surgery to drain on July 17.

## 2017-07-19 NOTE — DISCHARGE NOTE ADULT - MEDICATION SUMMARY - MEDICATIONS TO STOP TAKING
I will STOP taking the medications listed below when I get home from the hospital:    Neurontin 100 mg oral capsule  -- 1 cap(s) by mouth 3 times a day

## 2017-07-19 NOTE — DISCHARGE NOTE ADULT - REASON FOR ADMISSION
report received that pt was found next to outpt clinic on [facility], unresponsive, CPR was initiated by bystanders. ? seizure, OD,? psych Dx ? ETOH

## 2017-07-19 NOTE — DISCHARGE NOTE ADULT - MEDICATION SUMMARY - MEDICATIONS TO TAKE
I will START or STAY ON the medications listed below when I get home from the hospital:    ZyPREXA  -- 25 milligram(s) by mouth once a day (at bedtime)  -- Indication: For Psychosis I will START or STAY ON the medications listed below when I get home from the hospital:    ZyPREXA 20 mg oral tablet  -- 1 tab(s) by mouth once a day  -- Indication: For Paranoid schizophrenia I will START or STAY ON the medications listed below when I get home from the hospital:    ZyPREXA 20 mg oral tablet  -- 1 tab(s) by mouth once a day  -- Indication: For Paranoid schizophrenia    OLANZapine 20 mg oral tablet  -- 1 tab(s) by mouth once a day  -- It is very important that you take or use this exactly as directed.  Do not skip doses or discontinue unless directed by your doctor.  May cause drowsiness.  Alcohol may intensify this effect.  Use care when operating dangerous machinery.  Obtain medical advice before taking any non-prescription drugs as some may affect the action of this medication.    -- Indication: For Paranoid schizophrenia

## 2017-07-19 NOTE — DISCHARGE NOTE ADULT - SECONDARY DIAGNOSIS.
Hand blister, right, sequela Non-traumatic rhabdomyolysis Polysubstance abuse Schizophrenia, paranoid, chronic

## 2017-07-19 NOTE — DISCHARGE NOTE ADULT - PLAN OF CARE
Stay Sober Transfer to in patient drug rehab Avoid infection Follow up with primary care physician Transfer to in patient rehab Follow up with psychiatry. Follow up with primary care physician. Patient advised to keep hand elevated and participate in hand therapy.

## 2017-07-19 NOTE — DISCHARGE NOTE ADULT - CARE PLAN
Principal Discharge DX:	Drug overdose, accidental or unintentional, initial encounter  Goal:	Stay Sober  Instructions for follow-up, activity and diet:	Transfer to in patient drug rehab  Secondary Diagnosis:	Hand blister, right, sequela  Goal:	Avoid infection  Instructions for follow-up, activity and diet:	Follow up with primary care physician  Secondary Diagnosis:	Non-traumatic rhabdomyolysis  Instructions for follow-up, activity and diet:	Follow up with primary care physician  Secondary Diagnosis:	Polysubstance abuse  Instructions for follow-up, activity and diet:	Transfer to in patient rehab  Secondary Diagnosis:	Schizophrenia, paranoid, chronic  Instructions for follow-up, activity and diet:	Follow up with psychiatry. Principal Discharge DX:	Drug overdose, accidental or unintentional, initial encounter  Goal:	Stay Sober  Instructions for follow-up, activity and diet:	Transfer to in patient drug rehab  Secondary Diagnosis:	Hand blister, right, sequela  Goal:	Avoid infection  Instructions for follow-up, activity and diet:	Follow up with primary care physician. Patient advised to keep hand elevated and participate in hand therapy.  Secondary Diagnosis:	Non-traumatic rhabdomyolysis  Instructions for follow-up, activity and diet:	Follow up with primary care physician  Secondary Diagnosis:	Polysubstance abuse  Instructions for follow-up, activity and diet:	Transfer to in patient rehab  Secondary Diagnosis:	Schizophrenia, paranoid, chronic  Instructions for follow-up, activity and diet:	Follow up with psychiatry.

## 2017-07-20 PROCEDURE — 99232 SBSQ HOSP IP/OBS MODERATE 35: CPT

## 2017-07-20 RX ADMIN — OLANZAPINE 20 MILLIGRAM(S): 15 TABLET, FILM COATED ORAL at 21:28

## 2017-07-20 RX ADMIN — Medication 650 MILLIGRAM(S): at 04:51

## 2017-07-20 RX ADMIN — Medication 650 MILLIGRAM(S): at 03:51

## 2017-07-20 NOTE — DIETITIAN INITIAL EVALUATION ADULT. - OTHER INFO
Pt seen at bedside. Pt states having good appetite and PO intake currently and PTA. Pt denies n/v/c/d. No significant weight change.

## 2017-07-20 NOTE — PROGRESS NOTE ADULT - PROBLEM SELECTOR PLAN 4
Surgery to drain on July 17.  7/20 Patient refuses to elevate his hand or comply with therapy, therefore his hand is still swollen.

## 2017-07-21 VITALS
SYSTOLIC BLOOD PRESSURE: 128 MMHG | TEMPERATURE: 99 F | HEART RATE: 68 BPM | OXYGEN SATURATION: 97 % | DIASTOLIC BLOOD PRESSURE: 63 MMHG | RESPIRATION RATE: 18 BRPM

## 2017-07-21 PROCEDURE — 99239 HOSP IP/OBS DSCHRG MGMT >30: CPT

## 2017-07-21 RX ORDER — OLANZAPINE 15 MG/1
1 TABLET, FILM COATED ORAL
Qty: 30 | Refills: 0 | OUTPATIENT
Start: 2017-07-21 | End: 2017-08-20

## 2017-07-21 RX ORDER — OLANZAPINE 15 MG/1
1 TABLET, FILM COATED ORAL
Qty: 0 | Refills: 0 | DISCHARGE
Start: 2017-07-21 | End: 2017-08-20

## 2017-07-21 RX ORDER — DIGOXIN 250 MCG
0.12 TABLET ORAL DAILY
Qty: 0 | Refills: 0 | Status: DISCONTINUED | OUTPATIENT
Start: 2017-07-21 | End: 2017-07-21

## 2017-07-21 NOTE — PROGRESS NOTE ADULT - ASSESSMENT
29 year old M extensive psychiatric history, rhabdomyolysis, overdose
29 year old Male extensive psychiatric history, rhabdomyolysis, overdose
29 year old M extensive psychiatric history, rhabdomyolysis, overdose
29M extensive psychiatric history, rhabdomyolysis, overdose
30 y/o M with Right hand bullae
ARF resolved; large blister right hand
OD with ARF with K better, vented, coma
The patient is a 36y Male who was found unresponsive after drug overdose.   Seizure likely precipitated by cocaine. EEG without evidence of seizure activity at present.     Ativan as needed.     No further specific neurologic recommendations. Will be available as needed.

## 2017-07-21 NOTE — PROGRESS NOTE ADULT - PROBLEM SELECTOR PROBLEM 1
Drug overdose, accidental or unintentional, initial encounter
Bullae
Drug overdose, accidental or unintentional, initial encounter
Drug overdose, accidental or unintentional, initial encounter

## 2017-07-21 NOTE — PROGRESS NOTE BEHAVIORAL HEALTH - NSBHFUPINTERVALHXFT_PSY_A_CORE
Pt is a 38 y/o M w/ PPH of schizophrenia (Multiple psychiatric admissions throughout the years), Non-compliance w/ medication, polysubstance abuse, aggression, and legal history w/ multiple arrests and incarcerated total of at least 5 years. He was brought in after [facility] (Where the patient resides [facility]) called ambulance after finding patient in prone position, breathing, non-responsive lying next to empty vial. Pt discharged from [facility] on July 13th and arrived back to crisis center around 1pm same day - as per crisis center employee, the patient seemed fine and was found during morning rounds the morning after (July 14th) on the floor.  Patient seen for follow-up on medial floor today. He has been accepted to [facility] and scheduled for 5 pm  pending psychiatric  follow-up. Patient reports he is motivated for rehab and [facility]. He reports he is " happy" [facility] has accepted him and is remorseful he overdosed on heroin and Cocaine. Patient reports overdose was unintentional stating, " I did not have a tolerance and used too much." Patient reports good sleep and appetite, denies A/V/H, thoughts of paranoia, sx of manish or thoughts of hurting self or others.

## 2017-07-21 NOTE — PROGRESS NOTE BEHAVIORAL HEALTH - RISK ASSESSMENT
Patient is a chronic risk given his h/o substance abuse and mental illness, however, he is not an imminent risk sucuicide of homicide at this time for he is seeking treatment, has insight to his substance abuse and mental illness, is futuristic, and will be discharge to supportive environment.

## 2017-07-21 NOTE — PROGRESS NOTE BEHAVIORAL HEALTH - SUMMARY
Patient seen for follow-up. As per GOVIND patient required psychiatric f/u note prior to discharge to [facility]. Patient is medically cleared and does not required inpatient psychiatric hospitalization. Patient is motivated for rehab and overdose prior to admission was unintentional. He denies recent or current S/H/I/i/P, A/V/H, thoughts of paranoia or ideas of reference. Patient maintains good eye contact, is smiling appropriately, and denies sx of depression.  Agree with discharge to [facility].   Continue Zyprexa  mg HS

## 2017-07-21 NOTE — PROGRESS NOTE ADULT - SUBJECTIVE AND OBJECTIVE BOX
Allergy Status Unknown                                                             Code Status: full    [place], CRITICAL Patient is a 29y old  Male who presents with a chief complaint of report received that pt was found next to outpt clinic on [facility], unresponsive, CPR was initiated by bystanders. ? seizure, OD,? psych Dx ? ETOH (2017 14:24)      BRIEF HOSPITAL COURSE: admitted to MICU unresponsive and in rhabdomyolysis, renal failure, hyperkalemia, concerns for overdose of cocaine/opoids, and with CPR however unclear if just unresponsive or actually no pulse. Extubated 7/15/17 AM. awake/alert at baseline per Jas who's from [facility] and knows him well (Jas was at bedside). EEG without signs of seizures.     Events last 24 hours: as above    Review of systems:     right arm pain. otherwise all ROS are negative.       PAST MEDICAL & SURGICAL HISTORY:  Drug use  Paranoid schizophrenia  No significant past surgical history        Medications:        OLANZapine 20 milliGRAM(s) Oral two times a day      enoxaparin Injectable 30 milliGRAM(s) SubCutaneous every 24 hours    pantoprazole  Injectable 40 milliGRAM(s) IV Push daily        multiple electrolytes Injection Type 1 1000 milliLiter(s) IV Continuous <Continuous>            Mode: AC/ CMV (Assist Control/ Continuous Mandatory Ventilation)  RR (machine): 16  TV (machine): 400  FiO2: 25  PEEP: 5  MAP: 15  P-High: 25  PIP: 15      Adult Advanced Hemodynamics Last 24 Hrs  CVP(mm Hg): --  CVP(cm H2O): --  CO: --  CI: --  PA: --  PA(mean): --  PCWP: --  SVR: --  SVRI: --  PVR: --  PVRI: --      ICU Vital Signs Last 24 Hrs  T(C): 2.6 (15 Jul 2017 12:00), Max: 37.5 (2017 20:00)  T(F): 36.6 (15 Jul 2017 12:00), Max: 99.5 (2017 20:00)  HR: 66 (15 Jul 2017 12:00) (66 - 90)  BP: 128/81 (15 Jul 2017 12:00) (100/59 - 133/77)  BP(mean): 86 (15 Jul 2017 07:00) (68 - 98)  ABP: --  ABP(mean): --  RR: 20 (15 Jul 2017 12:00) (5 - 28)  SpO2: 98% (15 Jul 2017 12:00) (96% - 99%)    CAPILLARY BLOOD GLUCOSE                LABS:  CBC Full  -  ( 15 Jul 2017 07:23 )  WBC Count : 15.0 K/uL  Hemoglobin : 13.0 g/dL  Hematocrit : 38.7 %  Platelet Count - Automated : 81 K/uL  Mean Cell Volume : 99.2 fl  Mean Cell Hemoglobin : 33.3 pg  Mean Cell Hemoglobin Concentration : 33.6 g/dL  Auto Neutrophil # : x  Auto Lymphocyte # : x  Auto Monocyte # : x  Auto Eosinophil # : x  Auto Basophil # : x  Auto Neutrophil % : x  Auto Lymphocyte % : x  Auto Monocyte % : x  Auto Eosinophil % : x  Auto Basophil % : x    07-15    142  |  103  |  15.0  ----------------------------<  96  4.5   |  29.0  |  1.13    Ca    7.5<L>      15 Jul 2017 07:23  Phos  1.8     15  Mg     2.3     -15    TPro  7.3  /  Alb  4.9  /  TBili  0.7  /  DBili  x   /  AST  124<H>  /  ALT  135<H>  /  AlkPhos  66  07-14      CARDIAC MARKERS ( 15 Jul 2017 07:23 )  x     / x     / 6134 U/L / x     / 56.8 ng/mL       PT/INR - ( 2017 10:10 )   PT: 11.2 sec;   INR: 1.02 ratio         PTT - ( 2017 10:10 )  PTT:32.4 sec  Urinalysis Basic - ( 2017 10:28 )    Color: Yellow / Appearance: Clear / S.025 / pH: x  Gluc: x / Ketone: Trace  / Bili: Negative / Urobili: Negative mg/dL   Blood: x / Protein: 100 mg/dL / Nitrite: Negative   Leuk Esterase: Negative / RBC: 3-5 /HPF / WBC 0-2   Sq Epi: x / Non Sq Epi: x / Bacteria: x          CULTURES:      Physical Examination:    General: No acute distress.  Alert, oriented, interactive. Follows simple instructions    HEENT: Atraumatic, MMM, Pupils equal, reactive to light.  Symmetric.     PULM: Clear to auscultation bilaterally, no significant sputum production    CVS: Regular rate and rhythm, no murmurs, rubs; S1/S2. No JVD/HJR    ABD: Soft, nondistended, nontender, normoactive bowel sounds, no masses    EXT: No edema, nontender. Distal pulses 2+ equal bilaterally. 4x5cm blisters to right hand, dorsal and ventral aspects, left wrist 3x4, bilateral ankles various sizes (consistent with crossed extremities and laying on them).      SKIN: Warm and well perfused, no rashes noted.    Neuro: nonfocal, moves all extremities.     RADIOLOGY:   CRITICAL CARE TIME SPENT: 45 minutes
[facility]     Chief Complaint: Patient is a 29y old  Male who presents with a chief complaint of report received that pt was found next to outpt clinic on [facility], unresponsive, CPR was initiated by bystanders. ? seizure, OD,? psych Dx ? ETOH (2017 14:24)      PAST MEDICAL & SURGICAL HISTORY:  Drug use  Paranoid schizophrenia  No significant past surgical history      HPI/OVERNIGHT EVENTS: Patient complains of hand pain    MEDICATIONS  (STANDING):  enoxaparin Injectable 30 milliGRAM(s) SubCutaneous every 24 hours  multiple electrolytes Injection Type 1 1000 milliLiter(s) (150 mL/Hr) IV Continuous <Continuous>  OLANZapine 20 milliGRAM(s) Oral at bedtime      Vital Signs Last 24 Hrs  T(C): 2.6 (15 Jul 2017 12:00), Max: 37.5 (2017 20:00)  T(F): 36.6 (15 Jul 2017 12:00), Max: 99.5 (2017 20:00)  HR: 66 (15 Jul 2017 12:00) (66 - 90)  BP: 128/81 (15 Jul 2017 12:00) (106/55 - 133/77)  BP(mean): 86 (15 Jul 2017 07:00) (76 - 98)  RR: 20 (15 Jul 2017 12:00) (5 - 23)  SpO2: 98% (15 Jul 2017 12:00) (96% - 99%)    PHYSICAL EXAM:  Constitutional: NAD, well-groomed, well-developed  HEENT: PERRLA, EOMI, Normal Hearing, MMM  Neck: No LAD, No JVD  Back: Normal spine flexure, No CVA tenderness  Respiratory: CTAB Cardiovascular: S1 and S2, RRR, no M/G/R  Gastrointestinal: BS+, soft, NT/ND  Extremities: No peripheral edema  Vascular: 2+ peripheral pulses  Neurological: still slow mentally    CAPILLARY BLOOD GLUCOSE  142 (2017 09:51)    LABS:                        13.0   15.0  )-----------( 81       ( 15 Jul 2017 07:23 )             38.7     07-15    142  |  103  |  15.0  ----------------------------<  96  4.5   |  29.0  |  1.13    Ca    7.5<L>      15 Jul 2017 07:23  Phos  1.8     07-15  Mg     2.3     -15    TPro  7.3  /  Alb  4.9  /  TBili  0.7  /  DBili  x   /  AST  124<H>  /  ALT  135<H>  /  AlkPhos  66  07-14    PT/INR - ( 2017 10:10 )   PT: 11.2 sec;   INR: 1.02 ratio         PTT - ( 2017 10:10 )  PTT:32.4 sec  Urinalysis Basic - ( 2017 10:28 )    Color: Yellow / Appearance: Clear / S.025 / pH: x  Gluc: x / Ketone: Trace  / Bili: Negative / Urobili: Negative mg/dL   Blood: x / Protein: 100 mg/dL / Nitrite: Negative   Leuk Esterase: Negative / RBC: 3-5 /HPF / WBC 0-2   Sq Epi: x / Non Sq Epi: x / Bacteria: x        RADIOLOGY & ADDITIONAL TESTS:
INTERVAL HPI/OVERNIGHT EVENTS: Patient seen and evaluated at bedside. No acute distress. Pt c/o of RUE bullae most prominent of his hand in the anterior aspect. Denies pain, fevers, chills, nausea vomiting. Pt was seen yesterday by our service and his bullae was drained. Surgery re-consulted for reoccurence of bullae.         MEDICATIONS  (STANDING):  enoxaparin Injectable 30 milliGRAM(s) SubCutaneous every 24 hours  OLANZapine 20 milliGRAM(s) Oral at bedtime  multiple electrolytes Injection Type 1 1000 milliLiter(s) (125 mL/Hr) IV Continuous <Continuous>    MEDICATIONS  (PRN):  OLANZapine Disintegrating Tablet 10 milliGRAM(s) Oral every 4 hours PRN agitation  acetaminophen   Tablet. 650 milliGRAM(s) Oral every 6 hours PRN Mild Pain (1 - 3)  ketorolac   Injectable 15 milliGRAM(s) IV Push every 8 hours PRN Moderate Pain (4 - 6)      Vital Signs Last 24 Hrs  T(C): 36.6 (17 Jul 2017 07:30), Max: 37.5 (16 Jul 2017 23:42)  T(F): 97.9 (17 Jul 2017 07:30), Max: 99.5 (16 Jul 2017 23:42)  HR: 41 (17 Jul 2017 07:30) (41 - 56)  BP: 140/87 (17 Jul 2017 07:30) (123/76 - 140/87)  BP(mean): --  RR: 15 (16 Jul 2017 23:42) (15 - 18)  SpO2: 94% (16 Jul 2017 23:42) (94% - 98%)    Physical Exam:    Neurological:  No sensory/motor deficits    HEENT: PERRLA, EOMI, no drainage or redness    Neck: No bruits; no thyromegaly or nodules,  No JVD    Back: Normal spine flexure, No CVA tenderness, No deformity or limitation of movement    Respiratory: Breath Sounds equal & clear to auscultation, no accessory muscle use    Cardiovascular: Regular rate & rhythm, normal S1, S2; no murmurs, gallops or rubs    Gastrointestinal: Soft, non-tender, normal bowel sounds    Extremities: No peripheral edema, No cyanosis, clubbing. He has bullae in his RUE in his anterior hand measuring 5 x 5 cm     Vascular: Equal and normal pulses: 2+ peripheral pulses throughout    Musculoskeletal: No joint pain, swelling or deformity; no limitation of movement    Skin: No rashes      I&O's Detail      LABS:                RADIOLOGY & ADDITIONAL STUDIES:
NEPHROLOGY INTERVAL HPI/OVERNIGHT EVENTS: No new events.    MEDICATIONS  (STANDING):  enoxaparin Injectable 30 milliGRAM(s) SubCutaneous every 24 hours  OLANZapine 20 milliGRAM(s) Oral at bedtime  multiple electrolytes Injection Type 1 1000 milliLiter(s) (125 mL/Hr) IV Continuous <Continuous>    MEDICATIONS  (PRN):  OLANZapine Disintegrating Tablet 10 milliGRAM(s) Oral every 4 hours PRN agitation  acetaminophen   Tablet. 650 milliGRAM(s) Oral every 6 hours PRN Mild Pain (1 - 3)  ketorolac   Injectable 15 milliGRAM(s) IV Push every 8 hours PRN Moderate Pain (4 - 6)      Allergies    Allergy Status Unknown    Intolerances        Vital Signs Last 24 Hrs  T(C): 36.6 (17 Jul 2017 07:30), Max: 37.5 (16 Jul 2017 23:42)  T(F): 97.9 (17 Jul 2017 07:30), Max: 99.5 (16 Jul 2017 23:42)  HR: 41 (17 Jul 2017 07:30) (41 - 56)  BP: 140/87 (17 Jul 2017 07:30) (123/76 - 140/87)  BP(mean): --  RR: 15 (16 Jul 2017 23:42) (15 - 18)  SpO2: 94% (16 Jul 2017 23:42) (94% - 98%)  Daily     Daily     PHYSICAL EXAM:    GENERAL: oriented.  HEAD:    EYES: same  ENMT:   NECK: veins  flat  NERVOUS SYSTEM:  awake, alert  CHEST/LUNG: clear  HEART: no gallop   ABDOMEN: soft  EXTREMITIES:  blister  large right hand  LYMPH:   SKIN: lesions noted noted    LABS: Noted                      RADIOLOGY & ADDITIONAL TESTS:
NEPHROLOGY INTERVAL HPI/OVERNIGHT EVENTS: No new events.    MEDICATIONS  (STANDING):  enoxaparin Injectable 30 milliGRAM(s) SubCutaneous every 24 hours  pantoprazole  Injectable 40 milliGRAM(s) IV Push daily  chlorhexidine 0.12% Liquid 15 milliLiter(s) Swish and Spit two times a day  multiple electrolytes Injection Type 1 1000 milliLiter(s) (200 mL/Hr) IV Continuous <Continuous>  propofol Infusion 10 MICROgram(s)/kG/Min (4.938 mL/Hr) IV Continuous <Continuous>  OLANZapine 20 milliGRAM(s) Oral two times a day    MEDICATIONS  (PRN):      Allergies    Allergy Status Unknown            Vital Signs Last 24 Hrs  T(C): 37 (15 Jul 2017 04:00), Max: 37.5 (2017 20:00)  T(F): 98.6 (15 Jul 2017 04:00), Max: 99.5 (2017 20:00)  HR: 80 (15 Jul 2017 07:00) (70 - 106)  BP: 116/68 (15 Jul 2017 07:00) (67/37 - 146/89)  BP(mean): 86 (15 Jul 2017 07:00) (68 - 98)  RR: 7 (15 Jul 2017 07:00) (5 - 30)  SpO2: 98% (15 Jul 2017 07:00) (97% - 100%)  Daily     Daily     PHYSICAL EXAM:    GENERAL: comatose  HEAD:  same  EYES: closed  ENMT:   NECK: veins wnl  NERVOUS SYSTEM:  unresponsive  CHEST/LUNG: intubated with bs bilateral  HEART:  no rub  ABDOMEN: tube noted  EXTREMITIES:  blisters hands  LYMPH:   SKIN:    negron with I&O noted    LABS:                        16.7   32.2  )-----------( 265      ( 2017 10:10 )             50.8     07-14    138  |  97<L>  |  23.0<H>  ----------------------------<  163<H>  6.8<HH>   |  16.0<L>  |  2.67<H>    Ca    6.8<L>      2017 10:54    TPro  7.3  /  Alb  4.9  /  TBili  0.7  /  DBili  x   /  AST  124<H>  /  ALT  135<H>  /  AlkPhos  66  07-14    PT/INR - ( 2017 10:10 )   PT: 11.2 sec;   INR: 1.02 ratio         PTT - ( 2017 10:10 )  PTT:32.4 sec  Urinalysis Basic - ( 2017 10:28 )    Color: Yellow / Appearance: Clear / S.025 / pH: x  Gluc: x / Ketone: Trace  / Bili: Negative / Urobili: Negative mg/dL   Blood: x / Protein: 100 mg/dL / Nitrite: Negative   Leuk Esterase: Negative / RBC: 3-5 /HPF / WBC 0-2   Sq Epi: x / Non Sq Epi: x / Bacteria: x        ABG - ( 2017 10:28 )  pH: 7.19  /  pCO2: 47    /  pO2: 552   / HCO3: 16    / Base Excess: -10.6 /  SaO2: 100                   RADIOLOGY & ADDITIONAL TESTS:
[facility]                                 Ольга Christie, & Mike                                  [address]                                        [place], [place], [address]                                             [phone]      (REAL NAME: JUSTICE SHAH; : [birth year])    Now extubated (self).     Vital Signs Last 24 Hrs  T(F): 98.6 (15 Jul 2017 04:00), Max: 99.5 (2017 20:00)  HR: 80 (15 Jul 2017 07:00) (79 - 92)  BP: 116/68 (15 Jul 2017 07:00) (100/59 - 146/89)  BP(mean): 86 (15 Jul 2017 07:00) (68 - 98)  RR: 7 (15 Jul 2017 07:00) (5 - 30)  SpO2: 98% (15 Jul 2017 07:00) (97% - 99%)    Awake and alert.  Following instructions.   Pupils react.  Face symmetric.  Symmetric limb movement.
CRITICAL [place]     Chief Complaint: Patient is a 29y old  Male who presents with a chief complaint of report received that pt was found next to outpt clinic on [facility], unresponsive, CPR was initiated by bystanders. ? seizure, OD,? psych Dx ? ETOH (14 Jul 2017 14:24)      PAST MEDICAL & SURGICAL HISTORY:  Drug use  Paranoid schizophrenia  No significant past surgical history      HPI/OVERNIGHT EVENTS Surgical input appreciated.    MEDICATIONS  (STANDING):  enoxaparin Injectable 30 milliGRAM(s) SubCutaneous every 24 hours  OLANZapine 20 milliGRAM(s) Oral at bedtime  multiple electrolytes Injection Type 1 1000 milliLiter(s) (125 mL/Hr) IV Continuous <Continuous>      Vital Signs Last 24 Hrs  T(C): 36.6 (17 Jul 2017 07:30), Max: 37.5 (16 Jul 2017 23:42)  T(F): 97.9 (17 Jul 2017 07:30), Max: 99.5 (16 Jul 2017 23:42)  HR: 41 (17 Jul 2017 07:30) (41 - 56)  BP: 140/87 (17 Jul 2017 07:30) (123/76 - 140/87)  BP(mean): --  RR: 15 (16 Jul 2017 23:42) (15 - 18)  SpO2: 94% (16 Jul 2017 23:42) (94% - 98%)    PHYSICAL EXAM:  Constitutional: NAD, well-groomed, well-developed  HEENT: PERRLA, EOMI, Normal Hearing, MMM  Neck: No LAD, No JVD  Back: Normal spine flexure, No CVA tenderness  Respiratory: CTAB Cardiovascular: S1 and S2, RRR, no M/G/R  Gastrointestinal: BS+, soft, NT/ND  Extremities: No peripheral edema  Vascular: hand swollen    CAPILLARY BLOOD GLUCOSE  142 (14 Jul 2017 09:51)    LABS:                RADIOLOGY & ADDITIONAL TESTS:
[facility]     Chief Complaint: Patient is a 29y old  Male who presents with a chief complaint of report received that pt was found next to outpt clinic on [facility], unresponsive, CPR was initiated by bystanders. ? seizure, OD,? psych Dx ? ETOH (14 Jul 2017 14:24)      PAST MEDICAL & SURGICAL HISTORY:  Drug use  Paranoid schizophrenia  No significant past surgical history      HPI/OVERNIGHT EVENTS:    MEDICATIONS  (STANDING):  enoxaparin Injectable 30 milliGRAM(s) SubCutaneous every 24 hours  OLANZapine 20 milliGRAM(s) Oral at bedtime  multiple electrolytes Injection Type 1 1000 milliLiter(s) (125 mL/Hr) IV Continuous <Continuous>      Vital Signs Last 24 Hrs  T(C): 36.6 (17 Jul 2017 07:30), Max: 37.5 (16 Jul 2017 23:42)  T(F): 97.9 (17 Jul 2017 07:30), Max: 99.5 (16 Jul 2017 23:42)  HR: 41 (17 Jul 2017 07:30) (41 - 56)  BP: 140/87 (17 Jul 2017 07:30) (123/76 - 140/87)  BP(mean): --  RR: 15 (16 Jul 2017 23:42) (15 - 18)  SpO2: 94% (16 Jul 2017 23:42) (94% - 98%)    PHYSICAL EXAM:  Constitutional: NAD, well-groomed, well-developed  HEENT: PERRLA, EOMI, Normal Hearing, MMM  Neck: No LAD, No JVD  Back: Normal spine flexure, No CVA tenderness  Respiratory: CTAB Cardiovascular: S1 and S2, RRR, no M/G/R  Gastrointestinal: BS+, soft, NT/ND  Extremities: No peripheral edema  Vascular: 2+ peripheral pulses  Neurological: A/O x 3, no focal deficits  Psychiatric: Normal mood, normal affect  Musculoskeletal: 5/5 strength b/l upper and lower extremities  Skin: No rashes    CAPILLARY BLOOD GLUCOSE  142 (14 Jul 2017 09:51)    LABS:                RADIOLOGY & ADDITIONAL TESTS:
[facility]     Chief Complaint: Patient is a 29y old  Male who presents with a chief complaint of report received that pt was found next to outpt clinic on [facility], unresponsive, CPR was initiated by bystanders. ? seizure, OD,? psych Dx ? ETOH (19 Jul 2017 17:09)      PAST MEDICAL & SURGICAL HISTORY:  Drug use  Paranoid schizophrenia  No significant past surgical history      HPI/OVERNIGHT EVENTS: Patient stable    MEDICATIONS  (STANDING):  OLANZapine 20 milliGRAM(s) Oral at bedtime  multiple electrolytes Injection Type 1 1000 milliLiter(s) (125 mL/Hr) IV Continuous <Continuous>  enoxaparin Injectable 40 milliGRAM(s) SubCutaneous daily      Vital Signs Last 24 Hrs  T(C): 37.3 (20 Jul 2017 07:30), Max: 37.3 (20 Jul 2017 07:30)  T(F): 99.2 (20 Jul 2017 07:30), Max: 99.2 (20 Jul 2017 07:30)  HR: 54 (20 Jul 2017 07:30) (50 - 54)  BP: 134/75 (20 Jul 2017 07:30) (134/75 - 138/85)  BP(mean): --  RR: 18 (20 Jul 2017 07:30) (17 - 18)  SpO2: 96% (20 Jul 2017 07:30) (96% - 97%)    PHYSICAL EXAM:  Constitutional: NAD, well-groomed, well-developed  HEENT: PERRLA, EOMI, Normal Hearing, MMM  Neck: No LAD, No JVD  Back: Normal spine flexure, No CVA tenderness  Respiratory: CTAB Cardiovascular: S1 and S2, RRR, no M/G/R  Gastrointestinal: BS+, soft, NT/ND  Extremities: No peripheral edema  Vascular: 2+ peripheral pulses  Neurological: A/O x 3, no focal deficits  Psychiatric: Normal mood, normal affect  Musculoskeletal: 5/5 strength b/l upper and lower extremities  Skin: No rashes    CAPILLARY BLOOD GLUCOSE    LABS:                RADIOLOGY & ADDITIONAL TESTS:
[facility]     Chief Complaint: Patient is a 29y old  Male who presents with a chief complaint of report received that pt was found next to outpt clinic on [facility], unresponsive, CPR was initiated by bystanders. ? seizure, OD,? psych Dx ? ETOH (14 Jul 2017 14:24)      PAST MEDICAL & SURGICAL HISTORY:  Drug use  Paranoid schizophrenia  No significant past surgical history      HPI/OVERNIGHT EVENTS: Patient continues to improve slowly. His hand is less swollen.    MEDICATIONS  (STANDING):  OLANZapine 20 milliGRAM(s) Oral at bedtime  multiple electrolytes Injection Type 1 1000 milliLiter(s) (125 mL/Hr) IV Continuous <Continuous>  enoxaparin Injectable 40 milliGRAM(s) SubCutaneous daily      Vital Signs Last 24 Hrs  T(C): 37 (18 Jul 2017 04:00), Max: 37.3 (17 Jul 2017 23:07)  T(F): 98.6 (18 Jul 2017 04:00), Max: 99.1 (17 Jul 2017 23:07)  HR: 54 (18 Jul 2017 10:07) (42 - 59)  BP: 136/81 (18 Jul 2017 07:30) (118/75 - 139/78)  BP(mean): --  RR: 18 (18 Jul 2017 07:30) (16 - 19)  SpO2: 97% (18 Jul 2017 07:30) (95% - 97%)    PHYSICAL EXAM:  Constitutional: NAD, well-groomed, well-developed  HEENT: PERRLA, EOMI, Normal Hearing, MMM  Neck: No LAD, No JVD  Back: Normal spine flexure, No CVA tenderness  Respiratory: CTAB Cardiovascular: S1 and S2, RRR, no M/G/R  Gastrointestinal: BS+, soft, NT/ND  Extremities: No peripheral edema  Vascular: 2+ peripheral pulses  Neurological: A/O x 3, no focal deficits  Psychiatric: Normal mood, normal affect  Musculoskeletal: 5/5 strength b/l upper and lower extremities  Skin: No rashes    CAPILLARY BLOOD GLUCOSE    LABS:                        12.6   9.6   )-----------( 190      ( 18 Jul 2017 10:08 )             35.9     07-18    143  |  104  |  11.0  ----------------------------<  105  3.8   |  25.0  |  0.85    Ca    8.5<L>      18 Jul 2017 10:08  Phos  3.3     07-18  Mg     1.9     07-18            RADIOLOGY & ADDITIONAL TESTS:
[facility]     Chief Complaint: Patient is a 36y old  Male who presents with a chief complaint of report received that pt was found next to outpt clinic on [facility], unresponsive, CPR was initiated by bystanders. ? seizure, OD,? psych Dx ? ETOH (19 Jul 2017 17:09)      PAST MEDICAL & SURGICAL HISTORY:  Drug use  Paranoid schizophrenia  No significant past surgical history      HPI/OVERNIGHT EVENTS: Patient sleeping, arousable.    MEDICATIONS  (STANDING):  OLANZapine 20 milliGRAM(s) Oral at bedtime  multiple electrolytes Injection Type 1 1000 milliLiter(s) (125 mL/Hr) IV Continuous <Continuous>  enoxaparin Injectable 40 milliGRAM(s) SubCutaneous daily      Vital Signs Last 24 Hrs  T(C): 36.9 (21 Jul 2017 07:05), Max: 37.1 (20 Jul 2017 23:08)  T(F): 98.4 (21 Jul 2017 07:05), Max: 98.8 (20 Jul 2017 23:08)  HR: 54 (21 Jul 2017 07:05) (50 - 54)  BP: 130/74 (21 Jul 2017 07:05) (128/607 - 133/79)  BP(mean): --  RR: 18 (21 Jul 2017 07:05) (18 - 19)  SpO2: 96% (21 Jul 2017 07:05) (96% - 96%)    PHYSICAL EXAM:  Constitutional: NAD, well-groomed, well-developed  HEENT: facial assymettry  Neck: No LAD, No JVD  Back: Normal spine flexure, No CVA tenderness  Respiratory: CTAB Cardiovascular: S1 and S2, RRR, no M/G/R  Gastrointestinal: BS+, soft, NT/ND  Extremities:  Hand less swollen.  Vascular: 2+ peripheral pulses  Neurological: A/O x 3, no focal deficits  Psychiatric: Normal mood, normal affect  Musculoskeletal: 5/5 strength b/l upper and lower extremities  Skin: No rashes    CAPILLARY BLOOD GLUCOSE    LABS:                RADIOLOGY & ADDITIONAL TESTS:
[facility]     Chief Complaint: Patient is a 29y old  Male who presents with a chief complaint of report received that pt was found next to outpt clinic on [facility], unresponsive, CPR was initiated by bystanders. ? seizure, OD,? psych Dx ? ETOH (14 Jul 2017 14:24)      PAST MEDICAL & SURGICAL HISTORY:  Drug use  Paranoid schizophrenia  No significant past surgical history      HPI/OVERNIGHT EVENTS: Patient wants to go home. He still has facial assymettry and swollen hands    MEDICATIONS  (STANDING):  OLANZapine 20 milliGRAM(s) Oral at bedtime  multiple electrolytes Injection Type 1 1000 milliLiter(s) (125 mL/Hr) IV Continuous <Continuous>  enoxaparin Injectable 40 milliGRAM(s) SubCutaneous daily      Vital Signs Last 24 Hrs  T(C): 36.5 (19 Jul 2017 15:14), Max: 36.7 (19 Jul 2017 07:30)  T(F): 97.7 (19 Jul 2017 15:14), Max: 98.1 (19 Jul 2017 07:30)  HR: 43 (19 Jul 2017 15:14) (40 - 69)  BP: 129/81 (19 Jul 2017 15:14) (124/78 - 135/75)  BP(mean): --  RR: 18 (19 Jul 2017 15:14) (18 - 18)  SpO2: 98% (19 Jul 2017 15:14) (98% - 99%)    PHYSICAL EXAM:  Constitutional: NAD, well-groomed, well-developed  HEENT: facial swelling  Neck: No LAD, No JVD  Back: Normal spine flexure, No CVA tenderness  Respiratory: CTAB Cardiovascular: S1 and S2, RRR, no M/G/R  Gastrointestinal: BS+, soft, NT/ND  Extremities:  right hand swollen with blister    CAPILLARY BLOOD GLUCOSE    LABS:                        12.6   9.6   )-----------( 190      ( 18 Jul 2017 10:08 )             35.9     07-18    143  |  104  |  11.0  ----------------------------<  105  3.8   |  25.0  |  0.85    Ca    8.5<L>      18 Jul 2017 10:08  Phos  3.3     07-18  Mg     1.9     07-18            RADIOLOGY & ADDITIONAL TESTS:

## 2017-07-21 NOTE — PROGRESS NOTE ADULT - PROBLEM SELECTOR PROBLEM 3
Schizophrenia, paranoid, chronic

## 2017-07-21 NOTE — PROGRESS NOTE ADULT - PROBLEM SELECTOR PROBLEM 2
Non-traumatic rhabdomyolysis

## 2017-07-21 NOTE — PROGRESS NOTE ADULT - ATTENDING COMMENTS
IV fluid, monitor labs  - pending this am.
We will sign off today, call if needed
Awaiting placement
Stable for transfer
- no seizures that we have seen. neurology was following. If any seizures likely drug induced  - will require repeat CPKs and trending of renal function  - will require evaluation for appropriate placement prior to d/c. please coordinate with psychiatry, his , and P.O. prior to any discharge.     I spoke with Dr. Murrieta @ ~[phone] about active issues and continued medical therapy as Critical [place] does not required continued MICU therapy or monitoring at this time.
Toradol for pain

## 2017-07-21 NOTE — PROGRESS NOTE ADULT - PROBLEM SELECTOR PROBLEM 4
Hand blister, right, sequela

## 2017-07-21 NOTE — PROGRESS NOTE ADULT - PROBLEM SELECTOR PLAN 3
Follow up with Psychiatry
- psych following  - restarted zyprexa  - will require placement post hospital;   - please d/w his , as his P.O. has only offered a few options if this were to occur again.
Follow up with Psychiatry

## 2017-07-21 NOTE — PROGRESS NOTE ADULT - PROBLEM SELECTOR PLAN 1
He will need in patient rehab
- cocaine and opioid intoxication  - seems to be resolved at this time; h/o abuse in the past with relapse
He will need in patient rehab
Will re-drain bullae at bedside  C/w dressing changes with bacitracin  Surgery to sign off after drainage, reconsult as necessary

## 2017-09-01 ENCOUNTER — OUTPATIENT (OUTPATIENT)
Dept: OUTPATIENT SERVICES | Facility: HOSPITAL | Age: 37
LOS: 1 days | End: 2017-09-01
Payer: MEDICAID

## 2017-09-05 DIAGNOSIS — R69 ILLNESS, UNSPECIFIED: ICD-10-CM

## 2017-10-01 PROCEDURE — G9001: CPT

## 2017-10-26 ENCOUNTER — EMERGENCY (EMERGENCY)
Facility: HOSPITAL | Age: 37
LOS: 1 days | Discharge: HOME | End: 2017-10-26
Attending: EMERGENCY MEDICINE | Admitting: EMERGENCY MEDICINE
Payer: COMMERCIAL

## 2017-10-26 VITALS
OXYGEN SATURATION: 100 % | HEART RATE: 99 BPM | TEMPERATURE: 98 F | DIASTOLIC BLOOD PRESSURE: 90 MMHG | SYSTOLIC BLOOD PRESSURE: 156 MMHG | HEIGHT: 67 IN | WEIGHT: 177.91 LBS | RESPIRATION RATE: 16 BRPM

## 2017-10-26 PROCEDURE — 93010 ELECTROCARDIOGRAM REPORT: CPT

## 2017-10-26 PROCEDURE — 99283 EMERGENCY DEPT VISIT LOW MDM: CPT | Mod: 25

## 2017-10-26 PROCEDURE — 93005 ELECTROCARDIOGRAM TRACING: CPT

## 2017-10-26 PROCEDURE — 99284 EMERGENCY DEPT VISIT MOD MDM: CPT

## 2017-10-26 NOTE — ED PROVIDER NOTE - MEDICAL DECISION MAKING DETAILS
patient is neurologically intact and is medically cleared to return to [facility]. patient is neurologically intact and is medically cleared to return to [facility]. No somatic complaints, no physical signs of acute intoxication or withdrawal

## 2017-10-26 NOTE — ED ADULT TRIAGE NOTE - CHIEF COMPLAINT QUOTE
Sent from [facility] after smoking crack today. Sent for medical clearance. Denies CP or SOB. Denies nausea and vomiting. Denies any complaints. Reports smoking crack after being clean x 9 months. Sent from [facility] after smoking crack today. Sent for medical clearance. Denies CP or SOB. Denies nausea and vomiting. Denies any complaints. Reports smoking crack after being clean x 9 months. Placed in yellow gown by Stacy MADRID

## 2017-10-26 NOTE — ED ADULT NURSE REASSESSMENT NOTE - NS ED NURSE REASSESS COMMENT FT1
pt evaluated by dr girard and cleared for d/c.  d/c instructions reviewed with pt.  pt verbalized understanding.  ruth called.

## 2017-10-26 NOTE — ED PROVIDER NOTE - OBJECTIVE STATEMENT
This is a 36 y/o M presenting to the ED for medical evaluation. Patient states that he lives at [facility] and has a h/o crack cocaine use. Patient states that he was not used crack cocaine x 100 days but went to an AA meeting and relapsed. Today, patient told house worker that he used crack cocaine and then sent patient to the ED for evaluation. Patient states that he last used crack cocaine 5 hours ago. Patient denies CP, SOB, and difficulty breathing. He denies N/V as well.

## 2017-10-26 NOTE — ED PROVIDER NOTE - EYES, MLM
Clear bilaterally, pupils equal, round and reactive to light. Clear bilaterally, pupils equal, round and reactive to light. No nystagmus

## 2017-10-31 ENCOUNTER — EMERGENCY (EMERGENCY)
Facility: HOSPITAL | Age: 37
LOS: 1 days | Discharge: HOME | End: 2017-10-31
Attending: EMERGENCY MEDICINE
Payer: COMMERCIAL

## 2017-10-31 VITALS
TEMPERATURE: 98 F | HEART RATE: 98 BPM | OXYGEN SATURATION: 100 % | DIASTOLIC BLOOD PRESSURE: 87 MMHG | HEIGHT: 65 IN | WEIGHT: 175.05 LBS | SYSTOLIC BLOOD PRESSURE: 138 MMHG | RESPIRATION RATE: 18 BRPM

## 2017-10-31 DIAGNOSIS — F19.10 OTHER PSYCHOACTIVE SUBSTANCE ABUSE, UNCOMPLICATED: ICD-10-CM

## 2017-10-31 PROCEDURE — 99283 EMERGENCY DEPT VISIT LOW MDM: CPT

## 2017-10-31 PROCEDURE — 90792 PSYCH DIAG EVAL W/MED SRVCS: CPT

## 2017-10-31 PROCEDURE — 99284 EMERGENCY DEPT VISIT MOD MDM: CPT

## 2017-10-31 RX ORDER — OLANZAPINE 15 MG/1
5 TABLET, FILM COATED ORAL ONCE
Qty: 0 | Refills: 0 | Status: COMPLETED | OUTPATIENT
Start: 2017-10-31 | End: 2017-10-31

## 2017-10-31 RX ORDER — HALOPERIDOL DECANOATE 100 MG/ML
2 INJECTION INTRAMUSCULAR ONCE
Qty: 0 | Refills: 0 | Status: COMPLETED | OUTPATIENT
Start: 2017-10-31 | End: 2017-10-31

## 2017-10-31 RX ORDER — OLANZAPINE 15 MG/1
20 TABLET, FILM COATED ORAL ONCE
Qty: 0 | Refills: 0 | Status: COMPLETED | OUTPATIENT
Start: 2017-10-31 | End: 2017-10-31

## 2017-10-31 RX ADMIN — OLANZAPINE 5 MILLIGRAM(S): 15 TABLET, FILM COATED ORAL at 21:00

## 2017-10-31 RX ADMIN — OLANZAPINE 20 MILLIGRAM(S): 15 TABLET, FILM COATED ORAL at 21:00

## 2017-10-31 RX ADMIN — HALOPERIDOL DECANOATE 2 MILLIGRAM(S): 100 INJECTION INTRAMUSCULAR at 21:00

## 2017-10-31 NOTE — ED ADULT NURSE NOTE - CHIEF COMPLAINT QUOTE
pt brought to ED from [place] for psych eval due to increased agitation. as per EMS pt became angry when he was found with wet pants s/p "thinking about girls." pt is calm and cooperative on arrival, slightly anxious. denies SI/HI. does not want to be here.

## 2017-10-31 NOTE — ED ADULT NURSE REASSESSMENT NOTE - NS ED NURSE REASSESS COMMENT FT1
pt a&o x3 denies suicidal, homicidal or hallucinations a/v, pt states "I just want to go home" pt s/p evaluation with Dr Vázquez appears much calmer.

## 2017-10-31 NOTE — ED ADULT NURSE NOTE - OBJECTIVE STATEMENT
received pt In interview room cleared by ED attending Dr Sharpe, pt expresses that he does not belong in the psych area and that he has done nothing wrong, pt appears irritable but talking in an appropriate volume, pt states he was not aggressive at [facility] but refuses to elaborate on any details of the occurrence there. pt refusing to answer personal questions to this nurse and states "I will talk to the doctor" pt refusing to enter  area and declining to comply with  policy to change into yellow gown and turnover personal clothing, pt pacing room at times. Dr Vázquez made aware of pt's request.

## 2017-10-31 NOTE — ED PROVIDER NOTE - OBJECTIVE STATEMENT
37 year old male with a h/o paranoid schizophrenia and drug abuse currently in a rehab residence at [facility] presents with aggressive behavior. pt was masturbating when he was interrupted and became visibly angry and aggressive and was sent here for psych evaluation. pt is currently denies any complaints

## 2017-10-31 NOTE — ED ADULT TRIAGE NOTE - CHIEF COMPLAINT QUOTE
pt brought to ED from [facility] for psych eval due to increased agitation. as per EMS pt became angry when he was found with wet pants s/p "thinking about girls." pt is calm and cooperative on arrival, slightly anxious. denies SI/HI. does not want to be here.

## 2017-10-31 NOTE — ED BEHAVIORAL HEALTH ASSESSMENT NOTE - HPI (INCLUDE ILLNESS QUALITY, SEVERITY, DURATION, TIMING, CONTEXT, MODIFYING FACTORS, ASSOCIATED SIGNS AND SYMPTOMS)
37 year old man, history of schizoaffective disorder, polysubstance abuse, multiple prior psychiatric admission, hx of ACT team, hx of [facility] hospitalization , domiciled in [facility] for rehab sent by [facility] for agitation.    Patient reports he wants to go home states he does not know why he was sent here, denies having problems with staff or other residents, reports seeing  a psychiatrist today. He denies paranoia, AH, VH, homicidal ideation suicidal ideation. He denies ever being depressed and denies sleep disturbance. He has been calm and cooperative in ED, asked patient to take his qhs medications and he agreed to this.     Upon transfer to behavioral section patient became upset at being asked to remove his clothing and change into a gown, and stay in a locked section, however agreed to be evaluated in triage room.   Staff states 3 nights ago patient was noted to be in an out of the bathroom, appeared high admitted to smoking crack, is leaving against orders to meet drug dealers. Staff states since that time patient has been verbally aggressive daily and argumentative after which he was sent to ED. Staff states [facility]  cannot give patient his standing medications if he is intoxicated  and thus they have not given patient his medications. Staff states patient is on Haldol 2 mg qhs, olanzapine 25 mg qhs. Staff states patient has a hx of violence but has not been violent. Staff states patient has not slept well for the past 3 days.

## 2017-10-31 NOTE — ED BEHAVIORAL HEALTH ASSESSMENT NOTE - DETAILS
NA staff reports patient has a history of aggression [facility] informed qhs meds given and plan for dc
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
## 2017-10-31 NOTE — ED BEHAVIORAL HEALTH ASSESSMENT NOTE - SUMMARY
Patient is a 36y/o AA male denies PMH, with documented h/o schizophrenia and poor medication compliance currently hx of being under AOT domiciled in [facility], with h/o alcohol abuse, multiple inpatient psychiatric hospitalizations including [facility] x 6 moths, BIBA called by [facility] for agitation  Patient calm and cooperative in ED, has not taken medications in days because it is [facility] policy to not administer medications if patient abuses substances, thus likely worsening patient's decompensation in combination with substances use.  Patient given olanzapine 25 mg , haldol 2 mg in ED

## 2017-10-31 NOTE — ED BEHAVIORAL HEALTH ASSESSMENT NOTE - RISK ASSESSMENT
Chronic risk factors include hx of schizoaffective disorder, substance abuse and history of aggression. ACute risk factors include recent substance relapse and agitation today, not being given antipsychotics at [facility]. Patient overall calm and cooperative in ED , has not displayed any aggression, denies suicidal and homicidal ideation, has no hx of suicide attempts, is future oriented with plans to complete [facility] and look for work, find a girlfriend, is in stable physical health. Patient assessed to not be at imminent risk of harm to self or others.

## 2018-05-03 RX ORDER — OLANZAPINE 15 MG/1
25 TABLET, FILM COATED ORAL
Qty: 0 | Refills: 0 | COMMUNITY

## 2018-05-03 RX ORDER — GABAPENTIN 400 MG/1
1 CAPSULE ORAL
Qty: 0 | Refills: 0 | COMMUNITY

## 2018-07-16 ENCOUNTER — EMERGENCY (EMERGENCY)
Facility: HOSPITAL | Age: 38
LOS: 1 days | Discharge: HOME | End: 2018-07-16
Attending: EMERGENCY MEDICINE
Payer: COMMERCIAL

## 2018-07-16 VITALS — WEIGHT: 158.95 LBS | HEIGHT: 66 IN

## 2018-07-16 VITALS
SYSTOLIC BLOOD PRESSURE: 113 MMHG | DIASTOLIC BLOOD PRESSURE: 64 MMHG | TEMPERATURE: 98 F | HEART RATE: 73 BPM | OXYGEN SATURATION: 99 % | RESPIRATION RATE: 18 BRPM

## 2018-07-16 DIAGNOSIS — F14.10 COCAINE ABUSE, UNCOMPLICATED: ICD-10-CM

## 2018-07-16 DIAGNOSIS — F25.9 SCHIZOAFFECTIVE DISORDER, UNSPECIFIED: ICD-10-CM

## 2018-07-16 PROBLEM — F20.0 PARANOID SCHIZOPHRENIA: Chronic | Status: ACTIVE | Noted: 2017-07-03

## 2018-07-16 PROBLEM — F19.90 OTHER PSYCHOACTIVE SUBSTANCE USE, UNSPECIFIED, UNCOMPLICATED: Chronic | Status: ACTIVE | Noted: 2017-07-14

## 2018-07-16 LAB
ANION GAP SERPL CALC-SCNC: 14 MMOL/L — SIGNIFICANT CHANGE UP (ref 5–17)
APAP SERPL-MCNC: <7.5 UG/ML — LOW (ref 10–26)
BASOPHILS # BLD AUTO: 0 K/UL — SIGNIFICANT CHANGE UP (ref 0–0.2)
BASOPHILS NFR BLD AUTO: 0.4 % — SIGNIFICANT CHANGE UP (ref 0–2)
BUN SERPL-MCNC: 14 MG/DL — SIGNIFICANT CHANGE UP (ref 8–20)
CALCIUM SERPL-MCNC: 9.7 MG/DL — SIGNIFICANT CHANGE UP (ref 8.6–10.2)
CHLORIDE SERPL-SCNC: 101 MMOL/L — SIGNIFICANT CHANGE UP (ref 98–107)
CO2 SERPL-SCNC: 25 MMOL/L — SIGNIFICANT CHANGE UP (ref 22–29)
CREAT SERPL-MCNC: 0.97 MG/DL — SIGNIFICANT CHANGE UP (ref 0.5–1.3)
EOSINOPHIL # BLD AUTO: 0.1 K/UL — SIGNIFICANT CHANGE UP (ref 0–0.5)
EOSINOPHIL NFR BLD AUTO: 1.1 % — SIGNIFICANT CHANGE UP (ref 0–6)
GLUCOSE SERPL-MCNC: 85 MG/DL — SIGNIFICANT CHANGE UP (ref 70–115)
HCT VFR BLD CALC: 43.8 % — SIGNIFICANT CHANGE UP (ref 42–52)
HGB BLD-MCNC: 14.9 G/DL — SIGNIFICANT CHANGE UP (ref 14–18)
LYMPHOCYTES # BLD AUTO: 2.4 K/UL — SIGNIFICANT CHANGE UP (ref 1–4.8)
LYMPHOCYTES # BLD AUTO: 21.1 % — SIGNIFICANT CHANGE UP (ref 20–55)
MCHC RBC-ENTMCNC: 32.7 PG — HIGH (ref 27–31)
MCHC RBC-ENTMCNC: 34 G/DL — SIGNIFICANT CHANGE UP (ref 32–36)
MCV RBC AUTO: 96.1 FL — HIGH (ref 80–94)
MONOCYTES # BLD AUTO: 1 K/UL — HIGH (ref 0–0.8)
MONOCYTES NFR BLD AUTO: 8.7 % — SIGNIFICANT CHANGE UP (ref 3–10)
NEUTROPHILS # BLD AUTO: 7.7 K/UL — SIGNIFICANT CHANGE UP (ref 1.8–8)
NEUTROPHILS NFR BLD AUTO: 68.5 % — SIGNIFICANT CHANGE UP (ref 37–73)
PLATELET # BLD AUTO: 281 K/UL — SIGNIFICANT CHANGE UP (ref 150–400)
POTASSIUM SERPL-MCNC: 4.2 MMOL/L — SIGNIFICANT CHANGE UP (ref 3.5–5.3)
POTASSIUM SERPL-SCNC: 4.2 MMOL/L — SIGNIFICANT CHANGE UP (ref 3.5–5.3)
RBC # BLD: 4.56 M/UL — LOW (ref 4.6–6.2)
RBC # FLD: 14.7 % — SIGNIFICANT CHANGE UP (ref 11–15.6)
SALICYLATES SERPL-MCNC: <0.6 MG/DL — LOW (ref 10–20)
SODIUM SERPL-SCNC: 140 MMOL/L — SIGNIFICANT CHANGE UP (ref 135–145)
WBC # BLD: 11.2 K/UL — HIGH (ref 4.8–10.8)
WBC # FLD AUTO: 11.2 K/UL — HIGH (ref 4.8–10.8)

## 2018-07-16 PROCEDURE — 80307 DRUG TEST PRSMV CHEM ANLYZR: CPT

## 2018-07-16 PROCEDURE — 99284 EMERGENCY DEPT VISIT MOD MDM: CPT

## 2018-07-16 PROCEDURE — 36415 COLL VENOUS BLD VENIPUNCTURE: CPT

## 2018-07-16 PROCEDURE — 93005 ELECTROCARDIOGRAM TRACING: CPT

## 2018-07-16 PROCEDURE — 93010 ELECTROCARDIOGRAM REPORT: CPT

## 2018-07-16 PROCEDURE — 85027 COMPLETE CBC AUTOMATED: CPT

## 2018-07-16 PROCEDURE — 80048 BASIC METABOLIC PNL TOTAL CA: CPT

## 2018-07-16 PROCEDURE — 90792 PSYCH DIAG EVAL W/MED SRVCS: CPT

## 2018-07-16 RX ORDER — OLANZAPINE 15 MG/1
1 TABLET, FILM COATED ORAL
Qty: 0 | Refills: 0 | COMMUNITY
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
## 2018-07-16 NOTE — ED BEHAVIORAL HEALTH ASSESSMENT NOTE - DESCRIPTION (FIRST USE, LAST USE, QUANTITY, FREQUENCY, DURATION)
unknown reports cannabis is drug of choice hx cocaine abuse, per staff recent use unknown-reports heavy use in the past but not currently hx cocaine abuse, last use on Saturday
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
## 2018-07-16 NOTE — ED PROVIDER NOTE - OBJECTIVE STATEMENT
has hx of schizophrenia states he has had momentary thoughts of commiting suicide took coccaine on Saturday  hx of smoking

## 2018-07-16 NOTE — ED ADULT TRIAGE NOTE - CHIEF COMPLAINT QUOTE
Pt ambulatory in ED c/o "psychiatric issues." Pt reports he has hx of bipolar schizophrenia, has been feeling manic and depressed. pt states " I have felt suicidal and was curious what it felt like but I didn't want to do it." Denies specific plan at this time. [facility] Bland aware pt need for 1:1

## 2018-07-16 NOTE — ED BEHAVIORAL HEALTH ASSESSMENT NOTE - NS ED BHA PLAN TR BH CONTACTED FT
unavailable unavailable. Called ACT tea, [phone] ext [phone] left message on voicemail to call writer back.

## 2018-07-16 NOTE — ED ADULT NURSE NOTE - DISCHARGE TEACHING
return to local ED or call 911/EMS if symptoms worsen or thoughts to harm self or others develop, continue outpatient with ACT team
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
## 2018-07-16 NOTE — ED BEHAVIORAL HEALTH ASSESSMENT NOTE - RISK ASSESSMENT
Chronic risk factors include hx of schizoaffective disorder, substance abuse and history of aggression. ACute risk factors include recent substance relapse  Patient overall calm and cooperative in ED , has not displayed any aggression, denies suicidal and homicidal ideation, has no hx of suicide attempts, is future oriented, is in stable physical health. Patient assessed to not be at imminent risk of harm to self or others.

## 2018-07-16 NOTE — ED BEHAVIORAL HEALTH ASSESSMENT NOTE - OTHER PAST PSYCHIATRIC HISTORY (INCLUDE DETAILS REGARDING ONSET, COURSE OF ILLNESS, INPATIENT/OUTPATIENT TREATMENT)
[place] - 5/2016  [facility] - multiple visits  Multiple inpatient admission including [facility]. [place] - 5/2016  [facility] - multiple visits  Multiple inpatient admission including [facility].  Reprots last admission was on October of last year, no prior suicide attempts.  Reports he has h/o paranoia in the past but denies any currently

## 2018-07-16 NOTE — ED ADULT NURSE NOTE - CHPI ED SYMPTOMS NEG
no anxiety/no hallucinations/no suicidal/no change in level of consciousness/no paranoia/no homicidal/no agitation/no weakness

## 2018-07-16 NOTE — ED ADULT NURSE NOTE - OBJECTIVE STATEMENT
Patient presented in  area dressed in yellow gowns.  Patient affect sad and endorses a depressed mood.  Patient denies suicidal ideations but reports he has been thinking about what happens to us after we die.  Denies any past suicidal ideations or attempts.  Patient is currently living in [facility] apartment program and is being followed by the ACT team since being released from a correctional facility after seven months due to violation of probation secondary to having a "dirty urine".  Patient reports he has drank ETOH since release with last use several weeks ago and relapsed once on cocaine this past Saturday.  Patient has been hospitalized in the past with last at [facility] in 10/2017 due to paranoid thinking.  Denies any current psychotic symptoms.  Patient feels trigger to depression has been a lack of support from family members.  Patient endorses medication compliance with Olanzapine 20mg at bedtime.  Cooperative with security contraband assessment and securing belongings in [facility].

## 2018-07-18 ENCOUNTER — EMERGENCY (EMERGENCY)
Facility: HOSPITAL | Age: 38
LOS: 1 days | Discharge: HOME | End: 2018-07-18
Attending: EMERGENCY MEDICINE
Payer: COMMERCIAL

## 2018-07-18 VITALS — WEIGHT: 169.98 LBS | HEIGHT: 67 IN

## 2018-07-18 VITALS
RESPIRATION RATE: 18 BRPM | SYSTOLIC BLOOD PRESSURE: 107 MMHG | DIASTOLIC BLOOD PRESSURE: 69 MMHG | HEART RATE: 61 BPM | OXYGEN SATURATION: 99 % | TEMPERATURE: 98 F

## 2018-07-18 LAB
ANION GAP SERPL CALC-SCNC: 15 MMOL/L — SIGNIFICANT CHANGE UP (ref 5–17)
APAP SERPL-MCNC: <7.5 UG/ML — LOW (ref 10–26)
BASOPHILS # BLD AUTO: 0 K/UL — SIGNIFICANT CHANGE UP (ref 0–0.2)
BASOPHILS NFR BLD AUTO: 0.2 % — SIGNIFICANT CHANGE UP (ref 0–2)
BUN SERPL-MCNC: 13 MG/DL — SIGNIFICANT CHANGE UP (ref 8–20)
CALCIUM SERPL-MCNC: 9.4 MG/DL — SIGNIFICANT CHANGE UP (ref 8.6–10.2)
CHLORIDE SERPL-SCNC: 101 MMOL/L — SIGNIFICANT CHANGE UP (ref 98–107)
CO2 SERPL-SCNC: 22 MMOL/L — SIGNIFICANT CHANGE UP (ref 22–29)
CREAT SERPL-MCNC: 1.18 MG/DL — SIGNIFICANT CHANGE UP (ref 0.5–1.3)
EOSINOPHIL # BLD AUTO: 0 K/UL — SIGNIFICANT CHANGE UP (ref 0–0.5)
EOSINOPHIL NFR BLD AUTO: 0.3 % — SIGNIFICANT CHANGE UP (ref 0–6)
GLUCOSE SERPL-MCNC: 81 MG/DL — SIGNIFICANT CHANGE UP (ref 70–115)
HCT VFR BLD CALC: 42.5 % — SIGNIFICANT CHANGE UP (ref 42–52)
HGB BLD-MCNC: 14.4 G/DL — SIGNIFICANT CHANGE UP (ref 14–18)
LYMPHOCYTES # BLD AUTO: 15.7 % — LOW (ref 20–55)
LYMPHOCYTES # BLD AUTO: 2.7 K/UL — SIGNIFICANT CHANGE UP (ref 1–4.8)
MCHC RBC-ENTMCNC: 32.5 PG — HIGH (ref 27–31)
MCHC RBC-ENTMCNC: 33.9 G/DL — SIGNIFICANT CHANGE UP (ref 32–36)
MCV RBC AUTO: 95.9 FL — HIGH (ref 80–94)
MONOCYTES # BLD AUTO: 1 K/UL — HIGH (ref 0–0.8)
MONOCYTES NFR BLD AUTO: 6 % — SIGNIFICANT CHANGE UP (ref 3–10)
NEUTROPHILS # BLD AUTO: 13.5 K/UL — HIGH (ref 1.8–8)
NEUTROPHILS NFR BLD AUTO: 77.6 % — HIGH (ref 37–73)
PLATELET # BLD AUTO: 272 K/UL — SIGNIFICANT CHANGE UP (ref 150–400)
POTASSIUM SERPL-MCNC: 4.1 MMOL/L — SIGNIFICANT CHANGE UP (ref 3.5–5.3)
POTASSIUM SERPL-SCNC: 4.1 MMOL/L — SIGNIFICANT CHANGE UP (ref 3.5–5.3)
RBC # BLD: 4.43 M/UL — LOW (ref 4.6–6.2)
RBC # FLD: 14.1 % — SIGNIFICANT CHANGE UP (ref 11–15.6)
SALICYLATES SERPL-MCNC: <0.6 MG/DL — LOW (ref 10–20)
SODIUM SERPL-SCNC: 138 MMOL/L — SIGNIFICANT CHANGE UP (ref 135–145)
WBC # BLD: 17.4 K/UL — HIGH (ref 4.8–10.8)
WBC # FLD AUTO: 17.4 K/UL — HIGH (ref 4.8–10.8)

## 2018-07-18 PROCEDURE — 93005 ELECTROCARDIOGRAM TRACING: CPT

## 2018-07-18 PROCEDURE — 93010 ELECTROCARDIOGRAM REPORT: CPT

## 2018-07-18 PROCEDURE — 90792 PSYCH DIAG EVAL W/MED SRVCS: CPT

## 2018-07-18 PROCEDURE — 85027 COMPLETE CBC AUTOMATED: CPT

## 2018-07-18 PROCEDURE — 80048 BASIC METABOLIC PNL TOTAL CA: CPT

## 2018-07-18 PROCEDURE — 80307 DRUG TEST PRSMV CHEM ANLYZR: CPT

## 2018-07-18 PROCEDURE — 36415 COLL VENOUS BLD VENIPUNCTURE: CPT

## 2018-07-18 PROCEDURE — 99284 EMERGENCY DEPT VISIT MOD MDM: CPT

## 2018-07-18 NOTE — ED PROVIDER NOTE - PROGRESS NOTE DETAILS
continues to be observed by psych S/O RECEIVED FROM DR. SEAMAN. PSYCHIATRY CLEARED PT FOR DISCHARGE STATING THAT HE WAS HERE [address]ING COURT APPEARANCE

## 2018-07-18 NOTE — ED PROVIDER NOTE - OBJECTIVE STATEMENT
36 yo c/o paranoia here to be evalluated by Behavioral health no suicidal ideation no medical problems smokes budoes drink

## 2018-07-18 NOTE — ED ADULT TRIAGE NOTE - CHIEF COMPLAINT QUOTE
patient reports paranoid thoughts; states he thinks family members are trying to hurt him. denies thoughts of hurting himself or anyone else.
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
## 2018-07-18 NOTE — ED BEHAVIORAL HEALTH ASSESSMENT NOTE - DESCRIPTION (FIRST USE, LAST USE, QUANTITY, FREQUENCY, DURATION)
unknown-reports heavy use in the past but not currently reports cannabis is drug of choice- denies current use hx cocaine abuse, last use on Saturday

## 2018-07-18 NOTE — ED ADULT NURSE NOTE - OBJECTIVE STATEMENT
Patient returned to ED today after being seen by psychiatrist Monday for similar complaints. States he woke up this morning with "PTSD" from what he has "been through in the past" with his family. States his family lies and is not trustworthy, and they don't support him when he needs them to. When patient was here 2 days ago, he mentioned concerns about violating his terms of parole because he relapsed on cocaine. Admits today would be the day he was going to be "tested", and was feeling "really anxious". Denied suicidal ideation, or thoughts to harm self/others. Polite and cooperative during intake interview. Placed in yellow gowns after safety check completed by security. Continuing to monitor and assess for appropriate disposition.

## 2018-07-18 NOTE — ED ADULT NURSE REASSESSMENT NOTE - NS ED NURSE REASSESS COMMENT FT1
received pt awake alert and oriented x3, pt calm and cooperative with staff. pt endorses going home and following up with therapy tx at [facility].
Patient reports he is feeling better and will continue to take his medication and follow up outpatient with [facility]. Psychiatrist assessed patient and determined patient was not a danger to self/others. Patient denied any thoughts to of self harm. Has been calm and cooperative with staff while on unit. Ate well at lunch and dinner. No overt psychosis evident. Social work arranging transportation for patient to return home. Patient aware and expressed appreciation to staff. No distress noted, and no complaints offered.
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
## 2018-07-18 NOTE — ED BEHAVIORAL HEALTH ASSESSMENT NOTE - OTHER PAST PSYCHIATRIC HISTORY (INCLUDE DETAILS REGARDING ONSET, COURSE OF ILLNESS, INPATIENT/OUTPATIENT TREATMENT)
[place] - 5/2016  CPEP - multiple visits  Multiple inpatient admission including [facility].  Reports last admission was on October of last year, no prior suicide attempts.  Reports he has h/o paranoia in the past but denies any currently
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
## 2018-07-19 ENCOUNTER — EMERGENCY (EMERGENCY)
Facility: HOSPITAL | Age: 38
LOS: 1 days | Discharge: OTHER INSTITUTION | End: 2018-07-19
Attending: EMERGENCY MEDICINE
Payer: COMMERCIAL

## 2018-07-19 VITALS
HEART RATE: 64 BPM | DIASTOLIC BLOOD PRESSURE: 73 MMHG | RESPIRATION RATE: 18 BRPM | OXYGEN SATURATION: 97 % | TEMPERATURE: 98 F | SYSTOLIC BLOOD PRESSURE: 110 MMHG

## 2018-07-19 VITALS — WEIGHT: 179.9 LBS | HEIGHT: 67 IN

## 2018-07-19 DIAGNOSIS — F25.8 OTHER SCHIZOAFFECTIVE DISORDERS: ICD-10-CM

## 2018-07-19 DIAGNOSIS — F14.10 COCAINE ABUSE, UNCOMPLICATED: ICD-10-CM

## 2018-07-19 DIAGNOSIS — R69 ILLNESS, UNSPECIFIED: ICD-10-CM

## 2018-07-19 LAB
AMPHET UR-MCNC: NEGATIVE — SIGNIFICANT CHANGE UP
ANION GAP SERPL CALC-SCNC: 14 MMOL/L — SIGNIFICANT CHANGE UP (ref 5–17)
APAP SERPL-MCNC: <7.5 UG/ML — LOW (ref 10–26)
APPEARANCE UR: CLEAR — SIGNIFICANT CHANGE UP
BARBITURATES UR SCN-MCNC: NEGATIVE — SIGNIFICANT CHANGE UP
BASOPHILS # BLD AUTO: 0 K/UL — SIGNIFICANT CHANGE UP (ref 0–0.2)
BASOPHILS NFR BLD AUTO: 0.2 % — SIGNIFICANT CHANGE UP (ref 0–2)
BENZODIAZ UR-MCNC: NEGATIVE — SIGNIFICANT CHANGE UP
BILIRUB UR-MCNC: NEGATIVE — SIGNIFICANT CHANGE UP
BUN SERPL-MCNC: 14 MG/DL — SIGNIFICANT CHANGE UP (ref 8–20)
CALCIUM SERPL-MCNC: 9.6 MG/DL — SIGNIFICANT CHANGE UP (ref 8.6–10.2)
CHLORIDE SERPL-SCNC: 100 MMOL/L — SIGNIFICANT CHANGE UP (ref 98–107)
CO2 SERPL-SCNC: 27 MMOL/L — SIGNIFICANT CHANGE UP (ref 22–29)
COCAINE METAB.OTHER UR-MCNC: POSITIVE
COLOR SPEC: YELLOW — SIGNIFICANT CHANGE UP
CREAT SERPL-MCNC: 1.14 MG/DL — SIGNIFICANT CHANGE UP (ref 0.5–1.3)
DIFF PNL FLD: ABNORMAL
EOSINOPHIL # BLD AUTO: 0.1 K/UL — SIGNIFICANT CHANGE UP (ref 0–0.5)
EOSINOPHIL NFR BLD AUTO: 0.7 % — SIGNIFICANT CHANGE UP (ref 0–5)
EPI CELLS # UR: SIGNIFICANT CHANGE UP
ETHANOL SERPL-MCNC: <10 MG/DL — SIGNIFICANT CHANGE UP
GLUCOSE SERPL-MCNC: 118 MG/DL — HIGH (ref 70–115)
GLUCOSE UR QL: NEGATIVE MG/DL — SIGNIFICANT CHANGE UP
HCT VFR BLD CALC: 44.3 % — SIGNIFICANT CHANGE UP (ref 42–52)
HGB BLD-MCNC: 14.8 G/DL — SIGNIFICANT CHANGE UP (ref 14–18)
KETONES UR-MCNC: NEGATIVE — SIGNIFICANT CHANGE UP
LEUKOCYTE ESTERASE UR-ACNC: NEGATIVE — SIGNIFICANT CHANGE UP
LYMPHOCYTES # BLD AUTO: 2 K/UL — SIGNIFICANT CHANGE UP (ref 1–4.8)
LYMPHOCYTES # BLD AUTO: 20.6 % — SIGNIFICANT CHANGE UP (ref 20–55)
MCHC RBC-ENTMCNC: 32.1 PG — HIGH (ref 27–31)
MCHC RBC-ENTMCNC: 33.4 G/DL — SIGNIFICANT CHANGE UP (ref 32–36)
MCV RBC AUTO: 96.1 FL — HIGH (ref 80–94)
METHADONE UR-MCNC: NEGATIVE — SIGNIFICANT CHANGE UP
MONOCYTES # BLD AUTO: 0.7 K/UL — SIGNIFICANT CHANGE UP (ref 0–0.8)
MONOCYTES NFR BLD AUTO: 7.4 % — SIGNIFICANT CHANGE UP (ref 3–10)
NEUTROPHILS # BLD AUTO: 7 K/UL — SIGNIFICANT CHANGE UP (ref 1.8–8)
NEUTROPHILS NFR BLD AUTO: 70.9 % — SIGNIFICANT CHANGE UP (ref 37–73)
NITRITE UR-MCNC: NEGATIVE — SIGNIFICANT CHANGE UP
OPIATES UR-MCNC: NEGATIVE — SIGNIFICANT CHANGE UP
PCP SPEC-MCNC: SIGNIFICANT CHANGE UP
PCP UR-MCNC: NEGATIVE — SIGNIFICANT CHANGE UP
PH UR: 6.5 — SIGNIFICANT CHANGE UP (ref 5–8)
PLATELET # BLD AUTO: 272 K/UL — SIGNIFICANT CHANGE UP (ref 150–400)
POTASSIUM SERPL-MCNC: 4.3 MMOL/L — SIGNIFICANT CHANGE UP (ref 3.5–5.3)
POTASSIUM SERPL-SCNC: 4.3 MMOL/L — SIGNIFICANT CHANGE UP (ref 3.5–5.3)
PROT UR-MCNC: NEGATIVE MG/DL — SIGNIFICANT CHANGE UP
RBC # BLD: 4.61 M/UL — SIGNIFICANT CHANGE UP (ref 4.6–6.2)
RBC # FLD: 13.8 % — SIGNIFICANT CHANGE UP (ref 11–15.6)
RBC CASTS # UR COMP ASSIST: SIGNIFICANT CHANGE UP /HPF (ref 0–4)
SALICYLATES SERPL-MCNC: <0.6 MG/DL — LOW (ref 10–20)
SODIUM SERPL-SCNC: 141 MMOL/L — SIGNIFICANT CHANGE UP (ref 135–145)
SP GR SPEC: 1.01 — SIGNIFICANT CHANGE UP (ref 1.01–1.02)
THC UR QL: NEGATIVE — SIGNIFICANT CHANGE UP
UROBILINOGEN FLD QL: NEGATIVE MG/DL — SIGNIFICANT CHANGE UP
WBC # BLD: 9.9 K/UL — SIGNIFICANT CHANGE UP (ref 4.8–10.8)
WBC # FLD AUTO: 9.9 K/UL — SIGNIFICANT CHANGE UP (ref 4.8–10.8)
WBC UR QL: SIGNIFICANT CHANGE UP

## 2018-07-19 PROCEDURE — 99285 EMERGENCY DEPT VISIT HI MDM: CPT

## 2018-07-19 PROCEDURE — 80307 DRUG TEST PRSMV CHEM ANLYZR: CPT

## 2018-07-19 PROCEDURE — 36415 COLL VENOUS BLD VENIPUNCTURE: CPT

## 2018-07-19 PROCEDURE — 85027 COMPLETE CBC AUTOMATED: CPT

## 2018-07-19 PROCEDURE — 81001 URINALYSIS AUTO W/SCOPE: CPT

## 2018-07-19 PROCEDURE — 80048 BASIC METABOLIC PNL TOTAL CA: CPT

## 2018-07-19 NOTE — ED ADULT NURSE NOTE - NS PRO PASSIVE SMOKE EXP
patient arrived ambulatory to ED, awake, alert and oriented times 3, breathing unlabored,  patient complaining of N/V, decreased PO intake and epigastric pain which started last night.  No CP as per patient.
No

## 2018-07-19 NOTE — ED BEHAVIORAL HEALTH ASSESSMENT NOTE - OTHER
peers awaiting for placement and acceptance can not care for self, delusional thinking, hallucinations, paranoid 9.37

## 2018-07-19 NOTE — ED BEHAVIORAL HEALTH NOTE - BEHAVIORAL HEALTH NOTE
SW note: As per psych MD, Pt is in need of inpatient psych admission.  Pt requested admission at [facility] however no beds are available at the facility today.  Pt has been presented to [facility].  SW will continue to follow.

## 2018-07-19 NOTE — ED PROVIDER NOTE - RESPIRATORY, MLM
Breath sounds clear and equal bilaterally. Breath sounds clear and equal bilaterally. breathing nonlabored

## 2018-07-19 NOTE — ED PROVIDER NOTE - PSYCHIATRIC, MLM
Alert and oriented to person, place, time/situation. normal mood and affect. no apparent risk to self or others. behavior controlled, has some delusional thoughts. No active hallucinations

## 2018-07-19 NOTE — ED ADULT TRIAGE NOTE - CHIEF COMPLAINT QUOTE
Patient arrived ambulatory to ED via EMS, awake, alert, and oriented times 3, breathing unlabored.  patient was here yesterday and dx with schizophrenia.  Patient currently making bizarre statements, hearing voices.  patient also states he suffers from PTSD.   Denies SI/HI.  Denies alcohol.  Last use of drugs, Saturday of cocaine.  Dr. Molina called to bedside for eval to  Patient arrived ambulatory to ED via EMS, awake, alert, and oriented times 3, breathing unlabored.  patient was here yesterday and dx with schizophrenia.  Patient currently making bizarre statements, hearing voices.  patient also states he suffers from PTSD.   Denies SI/HI.  Denies alcohol.  Last use of drugs, Saturday of cocaine.  Dr. Molina called to bedside for eval to .  Calm and cooperative at this time.

## 2018-07-19 NOTE — ED BEHAVIORAL HEALTH ASSESSMENT NOTE - DESCRIPTION (FIRST USE, LAST USE, QUANTITY, FREQUENCY, DURATION)
smokes cigarettes 1/2- 1ppd unknown-reports heavy use in the past but not currently reports cannabis is drug of choice- denies current use hx cocaine abuse, last use on Saturday

## 2018-07-19 NOTE — ED BEHAVIORAL HEALTH ASSESSMENT NOTE - OTHER PAST PSYCHIATRIC HISTORY (INCLUDE DETAILS REGARDING ONSET, COURSE OF ILLNESS, INPATIENT/OUTPATIENT TREATMENT)
[place] - 5/2016  CPEP - multiple visits  Multiple inpatient admission including [facility].  Reports last admission was on October of last year, no prior suicide attempts.  Reports he has h/o paranoia  Outpatient ACT Team - [employer] AMANDEEP Connors

## 2018-07-19 NOTE — ED BEHAVIORAL HEALTH ASSESSMENT NOTE - RISK ASSESSMENT
Low risk for suicide, no hx of SI, no current ideations. High risk for aggressive behavior mainly due to paranoia: impulsive, delusional thinking, hearing voices, paranoid thinking, denies any homicidal ideations or violent urges currently

## 2018-07-19 NOTE — ED ADULT NURSE NOTE - CHIEF COMPLAINT QUOTE
Patient arrived ambulatory to ED via EMS, awake, alert, and oriented times 3, breathing unlabored.  patient was here yesterday and dx with schizophrenia.  Patient currently making bizarre statements, hearing voices.  patient also states he suffers from PTSD.   Denies SI/HI.  Denies alcohol.  Last use of drugs, Saturday of cocaine.  Dr. Molina called to bedside for eval to .  Calm and cooperative at this time.

## 2018-07-19 NOTE — ED BEHAVIORAL HEALTH ASSESSMENT NOTE - SUMMARY
Pt is a 36 y/o male with PMHx of polysubstance abuse and schizoaffective disorder, history of incarceration x 2 most recently last month, on parole , multiple inpatient psychiatric admissions, Multiple ED visits ( 3 visits this week 7/16, 7/18/ 7/29), delusional thinking, paranoia, visual hallucinations. Last use of cocaine Saturday 7/14 via smoking. Requires inpatient admission for therapy, possible medication adjustment, and stabilization.

## 2018-07-19 NOTE — ED BEHAVIORAL HEALTH NOTE - BEHAVIORAL HEALTH NOTE
SW note: Pt has been reviewed and accepted by [facility] dr. Bowman.  Transportation has been arranged with [facility].  Auth is needed.
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
## 2018-07-19 NOTE — ED PROVIDER NOTE - MEDICAL DECISION MAKING DETAILS
Send to  for further evaluation. No labs ordered as pt was just here yesterday, no cocaine use since Saturday, no active chest pain.

## 2018-07-19 NOTE — ED ADULT NURSE NOTE - OBJECTIVE STATEMENT
Patient returned to [facility]-ED Patient returned to [facility]-ED after being seen by psychiatrist yesterday and Monday. Complains of feeling "not right". States he has PTSD and feels like his family and others are "out to get me". Observed talking to self at times, and speech is rambling and tangential at times. Reports he feels like his medication is not working and is requesting inpatient treatment to be stabilized on his medications and be set up with appropriate follow up treatment. Denied suicidal/homicidal ideation.
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
## 2018-07-19 NOTE — ED PROVIDER NOTE - OBJECTIVE STATEMENT
38 y/o M pt with hx of drug use and paranoid schizophrenia presents to ED c/o "I feel like I have PTSD. I'm traumatized". Pt was seen yesterday for same thing, feels he needs inpatient psych. Last drug use was cocaine on Saturday. Pt denies chest pain. He states he feels someone wants to hurt him. Denies SI, HI

## 2018-10-10 NOTE — ED BEHAVIORAL HEALTH ASSESSMENT NOTE - DETAILS
Implemented All Universal Safety Interventions:  [place] to call system. Call bell, personal items and telephone within reach. Instruct patient to call for assistance. Room bathroom lighting operational. Non-slip footwear when patient is off stretcher. Physically safe environment: no spills, clutter or unnecessary equipment. Stretcher in lowest position, wheels locked, appropriate side rails in place. na

## 2018-10-23 NOTE — CONSULT NOTE ADULT - ASSESSMENT
Labor Progress Note  Patient seen, fetal heart rate and contraction pattern evaluated, patient examined. C/o pressure  Patient Vitals for the past 2 hrs:   BP Temp Pulse SpO2   10/23/18 1649 -- -- -- 100 %   10/23/18 1648 -- 98 °F (36.7 °C) -- --   10/23/18 1644 115/70 -- 71 100 %   10/23/18 1639 -- -- -- 98 %   10/23/18 1634 -- -- -- 99 %   10/23/18 1631 121/63 -- 75 --   10/23/18 1629 -- -- -- 100 %   10/23/18 1624 -- -- -- 100 %   10/23/18 1619 -- -- -- 100 %   10/23/18 1614 123/74 -- 91 99 %   10/23/18 1609 -- -- -- 100 %   10/23/18 1604 -- -- -- 100 %   10/23/18 1559 121/66 -- 78 100 %   10/23/18 1554 -- -- -- 100 %   10/23/18 1549 -- -- -- 100 %   10/23/18 1545 119/70 -- 79 --   10/23/18 1544 -- -- -- 100 %   10/23/18 1539 -- -- -- 100 %   10/23/18 1534 -- -- -- 100 %   10/23/18 1532 -- -- -- 91 %   10/23/18 1530 124/71 -- 76 --   10/23/18 1529 -- -- -- 100 %   10/23/18 1524 -- -- -- 96 %   10/23/18 1521 -- -- -- 93 %       Physical Exam:  Cervical Exam:  6 cm dilated    100% effaced    -2 station    Uterine Activity: Frequency: Every 2-4 minutes  Fetal Heart Rate: Reactive  Baseline: 130 per minute  Variability: moderate  Accelerations: yes  Decelerations: none  Pit at 1  S/p PCN [id number]  Sams out and then SROM, mod mec  Assessment/Plan:  IUP 41wks 1 day IOL labor   FWB reassuring cat 1 tracing  IOL s/p cervidil and sams, SROM, cont pit for UC pattern  Cont expectant management. NICU at delivery- mod mec  Plan/ expectations/ logistics reviewed.   Dr Edison Bhandari to assume care    Teresita Dick MD 28 y/o M with presumed OD  admitted with respiratory failure cocaine (+) u-tox , hyperkalemia, electrolyte dissaray  and WCT. Current ECG SR with IRBB suspect exposure to NA channel blocker toxicity.  Recc:  Continue supportive care  renal team evaluating Stat HD  Serial ECGs, check TTE to eval LV function 30 y/o M with presumed OD  admitted with respiratory failure cocaine (+) u-tox , hyperkalemia, electrolyte dissaray  and WCT. Current ECG SR with IRBB suspect exposure to NA channel blocker toxicity such as can be seen with Cocaine toxicity.    Recc:    Continue supportive care  Renal team evaluating Stat HD  Serial ECGs, check TTE to eval LV function.

## 2018-11-14 ENCOUNTER — EMERGENCY (EMERGENCY)
Facility: HOSPITAL | Age: 38
LOS: 1 days | Discharge: LEFT BEFORE DISCHARGE | End: 2018-11-14
Attending: EMERGENCY MEDICINE
Payer: COMMERCIAL

## 2018-11-14 VITALS
TEMPERATURE: 98 F | WEIGHT: 169.98 LBS | RESPIRATION RATE: 16 BRPM | HEIGHT: 67 IN | SYSTOLIC BLOOD PRESSURE: 124 MMHG | OXYGEN SATURATION: 100 % | DIASTOLIC BLOOD PRESSURE: 76 MMHG | HEART RATE: 73 BPM

## 2018-11-14 VITALS
SYSTOLIC BLOOD PRESSURE: 120 MMHG | HEART RATE: 67 BPM | DIASTOLIC BLOOD PRESSURE: 70 MMHG | RESPIRATION RATE: 17 BRPM | OXYGEN SATURATION: 98 %

## 2018-11-14 LAB
ANION GAP SERPL CALC-SCNC: 13 MMOL/L — SIGNIFICANT CHANGE UP (ref 5–17)
APTT BLD: 36.1 SEC — SIGNIFICANT CHANGE UP (ref 27.5–36.3)
BUN SERPL-MCNC: 7 MG/DL — LOW (ref 8–20)
CALCIUM SERPL-MCNC: 9 MG/DL — SIGNIFICANT CHANGE UP (ref 8.6–10.2)
CHLORIDE SERPL-SCNC: 103 MMOL/L — SIGNIFICANT CHANGE UP (ref 98–107)
CK SERPL-CCNC: 115 U/L — SIGNIFICANT CHANGE UP (ref 30–200)
CO2 SERPL-SCNC: 27 MMOL/L — SIGNIFICANT CHANGE UP (ref 22–29)
CREAT SERPL-MCNC: 0.86 MG/DL — SIGNIFICANT CHANGE UP (ref 0.5–1.3)
D DIMER BLD IA.RAPID-MCNC: <150 NG/ML DDU — SIGNIFICANT CHANGE UP
GLUCOSE SERPL-MCNC: 86 MG/DL — SIGNIFICANT CHANGE UP (ref 70–115)
HCT VFR BLD CALC: 44.6 % — SIGNIFICANT CHANGE UP (ref 42–52)
HGB BLD-MCNC: 15.7 G/DL — SIGNIFICANT CHANGE UP (ref 14–18)
INR BLD: 1.03 RATIO — SIGNIFICANT CHANGE UP (ref 0.88–1.16)
MCHC RBC-ENTMCNC: 34.1 PG — HIGH (ref 27–31)
MCHC RBC-ENTMCNC: 35.2 G/DL — SIGNIFICANT CHANGE UP (ref 32–36)
MCV RBC AUTO: 96.7 FL — HIGH (ref 80–94)
PLATELET # BLD AUTO: 250 K/UL — SIGNIFICANT CHANGE UP (ref 150–400)
POTASSIUM SERPL-MCNC: 4.3 MMOL/L — SIGNIFICANT CHANGE UP (ref 3.5–5.3)
POTASSIUM SERPL-SCNC: 4.3 MMOL/L — SIGNIFICANT CHANGE UP (ref 3.5–5.3)
PROTHROM AB SERPL-ACNC: 11.8 SEC — SIGNIFICANT CHANGE UP (ref 10–12.9)
RBC # BLD: 4.61 M/UL — SIGNIFICANT CHANGE UP (ref 4.6–6.2)
RBC # FLD: 13.5 % — SIGNIFICANT CHANGE UP (ref 11–15.6)
SODIUM SERPL-SCNC: 143 MMOL/L — SIGNIFICANT CHANGE UP (ref 135–145)
TROPONIN T SERPL-MCNC: <0.01 NG/ML — SIGNIFICANT CHANGE UP (ref 0–0.06)
WBC # BLD: 9 K/UL — SIGNIFICANT CHANGE UP (ref 4.8–10.8)
WBC # FLD AUTO: 9 K/UL — SIGNIFICANT CHANGE UP (ref 4.8–10.8)

## 2018-11-14 PROCEDURE — 99283 EMERGENCY DEPT VISIT LOW MDM: CPT

## 2018-11-14 PROCEDURE — 99285 EMERGENCY DEPT VISIT HI MDM: CPT

## 2018-11-14 PROCEDURE — 85730 THROMBOPLASTIN TIME PARTIAL: CPT

## 2018-11-14 PROCEDURE — 80048 BASIC METABOLIC PNL TOTAL CA: CPT

## 2018-11-14 PROCEDURE — 93010 ELECTROCARDIOGRAM REPORT: CPT

## 2018-11-14 PROCEDURE — 71045 X-RAY EXAM CHEST 1 VIEW: CPT | Mod: 26

## 2018-11-14 PROCEDURE — 85027 COMPLETE CBC AUTOMATED: CPT

## 2018-11-14 PROCEDURE — 85610 PROTHROMBIN TIME: CPT

## 2018-11-14 PROCEDURE — 85379 FIBRIN DEGRADATION QUANT: CPT

## 2018-11-14 PROCEDURE — 71045 X-RAY EXAM CHEST 1 VIEW: CPT

## 2018-11-14 PROCEDURE — 36415 COLL VENOUS BLD VENIPUNCTURE: CPT

## 2018-11-14 PROCEDURE — 84484 ASSAY OF TROPONIN QUANT: CPT

## 2018-11-14 PROCEDURE — 82550 ASSAY OF CK (CPK): CPT

## 2018-11-14 PROCEDURE — 93005 ELECTROCARDIOGRAM TRACING: CPT

## 2018-11-14 RX ORDER — SODIUM CHLORIDE 9 MG/ML
3 INJECTION INTRAMUSCULAR; INTRAVENOUS; SUBCUTANEOUS ONCE
Qty: 0 | Refills: 0 | Status: COMPLETED | OUTPATIENT
Start: 2018-11-14 | End: 2018-11-14

## 2018-11-14 RX ADMIN — SODIUM CHLORIDE 3 MILLILITER(S): 9 INJECTION INTRAMUSCULAR; INTRAVENOUS; SUBCUTANEOUS at 12:10

## 2018-11-14 NOTE — ED ADULT NURSE NOTE - OBJECTIVE STATEMENT
Patient c/o of chest pain that started at 0830 while he was on the bus; states it was a non-radiating pain
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
## 2018-11-14 NOTE — ED STATDOCS - PROGRESS NOTE DETAILS
39 y/o M pt  with hx of presents to ED c/o chest pain. 37 y/o M pt  with hx of drug abuse, paranoid schizophrenia  presents to ED c/o chest pain associated with nausea. Pain described as a pressure and burning sensation. Smoker.   PE: heart normal, lungs normal

## 2018-11-14 NOTE — ED ADULT TRIAGE NOTE - CHIEF COMPLAINT QUOTE
"I was going to visit someone and while l was on the bus 20 mins ago I got a burning pain in my chest and now I feel pressure" Pt [employer] & [employer].

## 2018-11-14 NOTE — ED PROVIDER NOTE - ATTENDING CONTRIBUTION TO CARE
I personally saw the patient with the resident, and completed the key components of the history and physical exam. I then discussed the management plan with the resident.  38 year old with Ph schizphrenia presents with acute epsode of sharp r sidec chets pain, now resolved  Gen: NAD, well appearing  CV: RRR  Pul: CTA b/l  Abd: Soft, non-distended, non-tender  Neuro: no focal deficits  Pain atypical for crdiac, but pt wishes to sign out ama rather than wait for second trop

## 2018-11-14 NOTE — ED PROVIDER NOTE - MEDICAL DECISION MAKING DETAILS
Will order chest pain workup, Xray, labs, Trops x1. If negative, likely MSK pain episode that has self resolved.

## 2018-11-14 NOTE — ED ADULT NURSE NOTE - NS ED NURSE LEVEL OF CONSCIOUSNESS AFFECT
I believe the burning sensation in your legs relates to pressure on the nerves and I expect that yoga will help strengthen the core and offload such pressure. Gentle yoga. Meditative aspects of this may help you process the stress you recently went through.    Given the chest wall pain you felt last night I feel it will be helpful to check a d-dimer as we discussed. It is likely to help reassure us that the pain you experienced was musculoskeletal which it sounds.  
Calm

## 2018-11-14 NOTE — ED ADULT NURSE NOTE - NSIMPLEMENTINTERV_GEN_ALL_ED
Implemented All Universal Safety Interventions:  [place] to call system. Call bell, personal items and telephone within reach. Instruct patient to call for assistance. Room bathroom lighting operational. Non-slip footwear when patient is off stretcher. Physically safe environment: no spills, clutter or unnecessary equipment. Stretcher in lowest position, wheels locked, appropriate side rails in place.

## 2018-11-14 NOTE — ED PROVIDER NOTE - PROGRESS NOTE DETAILS
Patient seated, resting comfortably on bed using cell phone. Denies any current pain at this time. Says he feels fine. Pending remaining labs to result and if normal, will dispo home. Dana: Pt with no pain currently. Well appearing,. CXR clear, trop neg, ekg non-ischemic. Atypical pain, however I advised the pt to proceed with second trop to more definitively rule out cardiac etiology. Pt states that he has an appointment and does not wish to stay. ia dvised the pt that he runs risk of MI and sudden cardiac death and that he would have to declien our medical advise and sign out ama, which he states he wishes to do

## 2018-12-11 NOTE — PROGRESS NOTE ADULT - PROBLEM/PLAN-1
Teja Barbozarne is a 35 y.o. male here for a non-provider visit for respiratory clearance    If abnormal was an in office provider notified today (if so, indicate provider)? Yes  Routed to PCP? No      
DISPLAY PLAN FREE TEXT
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
## 2018-12-14 NOTE — ED ADULT NURSE NOTE - AS SC BRADEN MOISTURE
Chief Complaint   Patient presents with    Cold Symptoms     for 2 weeks    Sore Throat     for 1 week-yesterday became severe     1. Have you been to the ER, urgent care clinic since your last visit? Hospitalized since your last visit  Yes   Lakhwinder GUTIERRES for cold/flu-10/2018  2. Have you seen or consulted any other health care providers outside of the [address] [place] [address] since your last visit? Include any pap smears or colon screenoing.  n (4) rarely moist

## 2019-03-21 NOTE — ED BEHAVIORAL HEALTH ASSESSMENT NOTE - MUSCLE TONE / STRENGTH
3/21/2019 
1:37 PM 
Case management note Patient came by squad and could not find ride home Set up transport by round trip Met with patient to discuss discharge planning. Patient is . She lives in tri level home. She is raising her 13 yr old sohail. Aleksey @ [address] [place] [address] Advance directive not on file Independent with ADL's, we are discharging her with a walker for safety Choice letter given and signed for [facility] Referral sent to [facility] through [facility]. Reason for Admission:   [place] All RRAT Score:       8 Plan for utilizing home health:  Setting up [facility], choice letter signed Likelihood of Readmission:   
                      
Transition of Care Plan:   Home with follow up appointment, home health. Care Management Interventions PCP Verified by CM: Yes(dr felipa white no nn) Mode of Transport at Discharge: Self Transition of Care Consult (CM Consult): Discharge Planning Current Support Network: Lives Alone Confirm Follow Up Transport: Family Plan discussed with Pt/Family/Caregiver: Yes Discharge Location Discharge Placement: Home with family assistance [place], [address]
Normal muscle tone/strength

## 2019-07-26 NOTE — PROVIDER CONTACT NOTE (CRITICAL VALUE NOTIFICATION) - NAME OF MD/NP/PA/DO NOTIFIED:
dr preston Follow up with your primary care provider in 24-48 hours  Follow up with the gastroenterologist next week to discuss your elevated liver function tests and the CT findings of the lesions on the liver. Your primary care provider can also repeat your blood work  Please use 650mg tylenol (or acetaminophen) every 6 hours and 600mg motrin (or advil or ibuprofen) every 6 hours as needed for pain/discomfort/swelling.  Make sure not to use more than 3500mg in any 24 hour period.   Any worsening of symptoms or new concerning symptoms return to the ED

## 2020-04-17 NOTE — BEHAVIORAL HEALTH ASSESSMENT NOTE - NSBHCONSULTWORKUP_PSY_A_CORE
[facility] Medicine Services  INPATIENT PROGRESS NOTE    Patient Name: Gema Chapman  Date of Admission: 4/4/2020  Today's Date: 04/17/20  Length of Stay: 13  Primary Care Physician: Duy Hall MD    Subjective   Chief Complaint: follow-up COVID infection  Fever         Patient doing great.  Remains on no supplemental oxygen.  Denies any shortness of breath.  Reports that she still has some cough but does not get anything to come up.  States that her appetite is really good.  She is not experiencing any pain.  She voices no new complaints.  This was a telehealth visit aided by real-time audio/visual communication tools and with assistance from nursing staff.        Review of Systems   Constitutional: Positive for fever.        All pertinent negatives and positives are as above. All other systems have been reviewed and are negative unless otherwise stated.     Objective    Temp:  [96.1 °F (35.6 °C)-98 °F (36.7 °C)] 97.3 °F (36.3 °C)  Heart Rate:  [50-75] 75  Resp:  [18-22] 18  BP: ()/(48-66) 122/63  Physical Exam   Constitutional:   Sitting up    HENT:   Head: Normocephalic.   Mouth/Throat: No oropharyngeal exudate.   Eyes: Pupils are equal, round, and reactive to light. No scleral icterus.   Pulmonary/Chest: Effort normal. No respiratory distress.   Remains on room air; nonlabored breathing; no accessory muscle use   Neurological: She is alert.   Psychiatric: She has a normal mood and affect.   Vitals reviewed.  This exam was performed remotely in the unit aided by real-time audio/visual communication tools and with assistance from nursing staff.        Results Review:  I have reviewed the labs, radiology results, and diagnostic studies.    Laboratory Data:   Results from last 7 days   Lab Units 04/11/20  0752   WBC 10*3/mm3 12.91*   HEMOGLOBIN g/dL 11.3*   HEMATOCRIT % 34.1   PLATELETS 10*3/mm3 424        Results from last 7 days   Lab Units 04/13/20  0832  04/11/20  0752   SODIUM mmol/L 142 141   POTASSIUM mmol/L 4.5 4.1   CHLORIDE mmol/L 103 104   CO2 mmol/L 28.0 26.0   BUN mg/dL 44* 52*   CREATININE mg/dL 1.42* 1.47*   CALCIUM mg/dL 9.6 9.3   BILIRUBIN mg/dL 0.5 0.6   ALK PHOS U/L 137* 145*   ALT (SGPT) U/L 186* 220*   AST (SGOT) U/L 72* 83*   GLUCOSE mg/dL 198* 257*       Culture Data:   No results found for: BLOODCX, URINECX, WOUNDCX, MRSACX, RESPCX, STOOLCX    Radiology Data:   Imaging Results (Last 24 Hours)     ** No results found for the last 24 hours. **          I have reviewed the patient's current medications.     Assessment/Plan     Active Hospital Problems    Diagnosis   • **COVID-19 virus detected   • Essential hypertension   • Vascular dementia without behavioral disturbance (CMS/HCC)   • Reactive depression   • Obesity (BMI 30-39.9)   • Elevated LFTs   • Elevated LDH   • Elevated ferritin   • CKD (chronic kidney disease) stage 4, GFR 15-29 ml/min (CMS/HCC)   • Febrile illness   • Type 2 diabetes mellitus (CMS/HCC)     Plan:  1.  Completed treatment with hydroxychloroquine  2.  Now on room air  3.  Repeat COVID-19 testing was positive on 4/15.  Repeat testing today, and also on Sunday  4.  Discussed with Dr. Castaneda this AM  5.  PT/OT (telehealth) - increase activity as possible; OOB as much as possible  6.  Continue telemetry monitoring - having episodes of bradycardia; patient remains asymptomatic at this time; continue to monitor; overall trend appears to be improving  7.  Blood sugars improving.  Titrate insulin Levemir to 40 units twice daily; outpt med rec list 61 units Lantus twice daily  8.  Dispo: two consecutive negative tests obtained 24-48hrs apart will be required before placement Patient remains afebrile.  9.  Continue supportive care      Michael Huynh MD   04/17/20   11:00   no

## 2020-06-04 NOTE — ED ADULT NURSE NOTE - PYSCHOSOCIAL ASSESSMENT
Area M Indication Text: Tumors in this location are included in Area M (cheek, forehead, scalp, neck, jawline and pretibial skin).  Mohs surgery is indicated for tumors in these anatomic locations. - - -.

## 2020-06-22 ENCOUNTER — EMERGENCY (EMERGENCY)
Facility: HOSPITAL | Age: 40
LOS: 1 days | Discharge: HOME | End: 2020-06-22
Attending: EMERGENCY MEDICINE
Payer: MEDICAID

## 2020-06-22 VITALS
OXYGEN SATURATION: 96 % | HEIGHT: 67 IN | TEMPERATURE: 98 F | DIASTOLIC BLOOD PRESSURE: 73 MMHG | SYSTOLIC BLOOD PRESSURE: 117 MMHG | WEIGHT: 173.06 LBS | HEART RATE: 70 BPM | RESPIRATION RATE: 18 BRPM

## 2020-06-22 DIAGNOSIS — F19.10 OTHER PSYCHOACTIVE SUBSTANCE ABUSE, UNCOMPLICATED: ICD-10-CM

## 2020-06-22 DIAGNOSIS — F25.9 SCHIZOAFFECTIVE DISORDER, UNSPECIFIED: ICD-10-CM

## 2020-06-22 DIAGNOSIS — F48.9 NONPSYCHOTIC MENTAL DISORDER, UNSPECIFIED: ICD-10-CM

## 2020-06-22 LAB
ALBUMIN SERPL ELPH-MCNC: 4.4 G/DL — SIGNIFICANT CHANGE UP (ref 3.3–5.2)
ALP SERPL-CCNC: 51 U/L — SIGNIFICANT CHANGE UP (ref 40–120)
ALT FLD-CCNC: 9 U/L — SIGNIFICANT CHANGE UP
AMPHET UR-MCNC: NEGATIVE — SIGNIFICANT CHANGE UP
ANION GAP SERPL CALC-SCNC: 11 MMOL/L — SIGNIFICANT CHANGE UP (ref 5–17)
APAP SERPL-MCNC: <7.5 UG/ML — LOW (ref 10–26)
AST SERPL-CCNC: 14 U/L — SIGNIFICANT CHANGE UP
BARBITURATES UR SCN-MCNC: NEGATIVE — SIGNIFICANT CHANGE UP
BASOPHILS # BLD AUTO: 0.04 K/UL — SIGNIFICANT CHANGE UP (ref 0–0.2)
BASOPHILS NFR BLD AUTO: 0.5 % — SIGNIFICANT CHANGE UP (ref 0–2)
BENZODIAZ UR-MCNC: NEGATIVE — SIGNIFICANT CHANGE UP
BILIRUB SERPL-MCNC: 0.4 MG/DL — SIGNIFICANT CHANGE UP (ref 0.4–2)
BUN SERPL-MCNC: 16 MG/DL — SIGNIFICANT CHANGE UP (ref 8–20)
CALCIUM SERPL-MCNC: 9.4 MG/DL — SIGNIFICANT CHANGE UP (ref 8.6–10.2)
CHLORIDE SERPL-SCNC: 103 MMOL/L — SIGNIFICANT CHANGE UP (ref 98–107)
CO2 SERPL-SCNC: 23 MMOL/L — SIGNIFICANT CHANGE UP (ref 22–29)
COCAINE METAB.OTHER UR-MCNC: NEGATIVE — SIGNIFICANT CHANGE UP
CREAT SERPL-MCNC: 0.82 MG/DL — SIGNIFICANT CHANGE UP (ref 0.5–1.3)
EOSINOPHIL # BLD AUTO: 0.04 K/UL — SIGNIFICANT CHANGE UP (ref 0–0.5)
EOSINOPHIL NFR BLD AUTO: 0.5 % — SIGNIFICANT CHANGE UP (ref 0–6)
ETHANOL SERPL-MCNC: <10 MG/DL — SIGNIFICANT CHANGE UP
GLUCOSE SERPL-MCNC: 110 MG/DL — HIGH (ref 70–99)
HCT VFR BLD CALC: 44.4 % — SIGNIFICANT CHANGE UP (ref 39–50)
HGB BLD-MCNC: 15.2 G/DL — SIGNIFICANT CHANGE UP (ref 13–17)
HIV 1 & 2 AB SERPL IA.RAPID: SIGNIFICANT CHANGE UP
IMM GRANULOCYTES NFR BLD AUTO: 0.2 % — SIGNIFICANT CHANGE UP (ref 0–1.5)
LYMPHOCYTES # BLD AUTO: 1.63 K/UL — SIGNIFICANT CHANGE UP (ref 1–3.3)
LYMPHOCYTES # BLD AUTO: 18.5 % — SIGNIFICANT CHANGE UP (ref 13–44)
MCHC RBC-ENTMCNC: 33 PG — SIGNIFICANT CHANGE UP (ref 27–34)
MCHC RBC-ENTMCNC: 34.2 GM/DL — SIGNIFICANT CHANGE UP (ref 32–36)
MCV RBC AUTO: 96.5 FL — SIGNIFICANT CHANGE UP (ref 80–100)
METHADONE UR-MCNC: NEGATIVE — SIGNIFICANT CHANGE UP
MONOCYTES # BLD AUTO: 0.71 K/UL — SIGNIFICANT CHANGE UP (ref 0–0.9)
MONOCYTES NFR BLD AUTO: 8.1 % — SIGNIFICANT CHANGE UP (ref 2–14)
NEUTROPHILS # BLD AUTO: 6.36 K/UL — SIGNIFICANT CHANGE UP (ref 1.8–7.4)
NEUTROPHILS NFR BLD AUTO: 72.2 % — SIGNIFICANT CHANGE UP (ref 43–77)
OPIATES UR-MCNC: NEGATIVE — SIGNIFICANT CHANGE UP
PCP SPEC-MCNC: SIGNIFICANT CHANGE UP
PCP UR-MCNC: NEGATIVE — SIGNIFICANT CHANGE UP
PLATELET # BLD AUTO: 275 K/UL — SIGNIFICANT CHANGE UP (ref 150–400)
POTASSIUM SERPL-MCNC: 4 MMOL/L — SIGNIFICANT CHANGE UP (ref 3.5–5.3)
POTASSIUM SERPL-SCNC: 4 MMOL/L — SIGNIFICANT CHANGE UP (ref 3.5–5.3)
PROT SERPL-MCNC: 6.6 G/DL — SIGNIFICANT CHANGE UP (ref 6.6–8.7)
RBC # BLD: 4.6 M/UL — SIGNIFICANT CHANGE UP (ref 4.2–5.8)
RBC # FLD: 14.2 % — SIGNIFICANT CHANGE UP (ref 10.3–14.5)
SALICYLATES SERPL-MCNC: <0.6 MG/DL — LOW (ref 10–20)
SODIUM SERPL-SCNC: 137 MMOL/L — SIGNIFICANT CHANGE UP (ref 135–145)
THC UR QL: NEGATIVE — SIGNIFICANT CHANGE UP
WBC # BLD: 8.8 K/UL — SIGNIFICANT CHANGE UP (ref 3.8–10.5)
WBC # FLD AUTO: 8.8 K/UL — SIGNIFICANT CHANGE UP (ref 3.8–10.5)

## 2020-06-22 PROCEDURE — 80053 COMPREHEN METABOLIC PANEL: CPT

## 2020-06-22 PROCEDURE — U0003: CPT

## 2020-06-22 PROCEDURE — 93010 ELECTROCARDIOGRAM REPORT: CPT

## 2020-06-22 PROCEDURE — 80307 DRUG TEST PRSMV CHEM ANLYZR: CPT

## 2020-06-22 PROCEDURE — 99284 EMERGENCY DEPT VISIT MOD MDM: CPT

## 2020-06-22 PROCEDURE — 90792 PSYCH DIAG EVAL W/MED SRVCS: CPT

## 2020-06-22 PROCEDURE — 93005 ELECTROCARDIOGRAM TRACING: CPT

## 2020-06-22 PROCEDURE — 86703 HIV-1/HIV-2 1 RESULT ANTBDY: CPT

## 2020-06-22 PROCEDURE — 85027 COMPLETE CBC AUTOMATED: CPT

## 2020-06-22 PROCEDURE — 36415 COLL VENOUS BLD VENIPUNCTURE: CPT

## 2020-06-22 NOTE — ED ADULT NURSE NOTE - NSIMPLEMENTINTERV_GEN_ALL_ED
Implemented All Universal Safety Interventions:  [place] to call system. Call bell, personal items and telephone within reach. Instruct patient to call for assistance. Room bathroom lighting operational. Non-slip footwear when patient is off stretcher. Physically safe environment: no spills, clutter or unnecessary equipment. Stretcher in lowest position, wheels locked, appropriate side rails in place.

## 2020-06-22 NOTE — ED BEHAVIORAL HEALTH ASSESSMENT NOTE - SUMMARY
Pt is a 38 y/o AA male single, no dependants, lives at [facility] since [facility] after 8 mo stay at [place] in Pt psych with PPHx of   Schizoaffective disorder, polysubstance abuse, multiple inpatient psychiatric admissions, rehab facilities,  HX with ACT team, and  Multiple incarcerations for burglary and violating parole with released 6/29/2018 from [facility],  currently with [facility] tx and on [place] with [facility] (see Alpha), presenting to [facility] via EMS activated by [facility] residence due to increasing bizarre behavior and verbal aggression.   Pt offered admission to in pt psych due to delusions and irritability and upon recommendations from out Pt provider but Pt declined and does not meet criteria for involuntary psych admission.  Collateral as residence and [facility] could not give imminent danger risks or behavior to C.  Pt advised to return to ED if SI/HI/AH or if feels unsafe or threatened and is agreeable.

## 2020-06-22 NOTE — ED BEHAVIORAL HEALTH ASSESSMENT NOTE - HPI (INCLUDE ILLNESS QUALITY, SEVERITY, DURATION, TIMING, CONTEXT, MODIFYING FACTORS, ASSOCIATED SIGNS AND SYMPTOMS)
Pt is a 38 y/o AA male single, no dependants, lives at [facility] housing since discharge from [facility] after 8 mo stay at [place] in Pt psych with PPHx of   Schizoaffective disorder, polysubstance abuse, multiple inpatient psychiatric admissions, rehab facilities,  HX with ACT team, and  Multiple incarcerations for burglary and violating parole with released 6/29/2018 from [facility],  currently with [facility] tx and on [place] with [facility] (see Alpha), presenting to [facility] via EMS activated by [facility] residence due to increasing bizarre behavior and verbal aggression.   Pt denies having psych condition and believes he was misdiagnosed and actual dg is  " PTSD, voices, and being traumatized."   Pt denies depression and rates it a 2 out of 10, 10 being worst.  He claims he does not know why he is here and that PO told him to come.  Pt denies AH, VH and delusions, however did reveal active delusions of believe he has over 500 children and claims he was told he was God by someone, but does not himself believe he is God.  Pt states he believes in God and that God in in him as well as evil being in him.  Pt denies SI/HI and denies h/o aggression.  Pt claims can only sleep approx 4 hrs/night because his room mate smell and the bed has been slept in by Covid.   Pt and so sleeps on chair. Pt admits to cocaine use last time one week ago and claims he has no choice as he is bored.  Pty also to MJ use 5 days ag , tox neg and BAl < 10.  Pt admits to elaborate delusions of men changing into women and claims he has over 5000 children and Jimi Levin told him he was his father".  Per [facility] staff,  Ameya,  Pt came into office on Thursday and was ranting about being God.  Pt has h/o delusions and are chronic delusions.  Staff reports Pt was verbally aggressive but was redirectable and no violent or aggressive behavior was observed.  Spoke with César, his counselor at [facility] who states Pt was seen by Dr Yee at [facility], [facility] on Zoom and recommended in pt psych admission.  On On same day at [facility],  staff called EMS but because is not on AOT and Pt refused,  he did not meet criteria at that time to be taken against his will.  Pt came in today after mandate by  via request of staff from [facility] for psych eval.  Recommendations are for psych admission due to delusions and irritability, but Pt declined saying he has an interview at [employer] [facility] [employer] and does not think he needs admission. Pt is in control, no acute psych condition which requires involuntary psych admission.  Pt is fairly related, good eye contact, intense stare, pacing at times mild irritability.  Thinking is organized, illogical delusions, chronic?, denying acute belief of omnipotence or of being God.  Admits to fixed belief  about being told he was God by others, being the father of Jimi Levin, getting messages from actor on TV, denies FUAD/HI/AH/VH.

## 2020-06-22 NOTE — CHART NOTE - NSCHARTNOTEFT_GEN_A_CORE
Pt cleared by psych for d/c. Pt lives at [facility] residence in [place]. GOVIND spoke with Alejandra from [facility] to make community residence aware of pt return. SW scheduled medicaid cab. [facility] are aware of pt return to the program. No SW needs.

## 2020-06-22 NOTE — ED BEHAVIORAL HEALTH ASSESSMENT NOTE - RISK ASSESSMENT
RF chronic delusions, h/o incarceration, poor insight  PF no h/o suicide attempt.  aggression while incarcerated only. Low Acute Suicide Risk

## 2020-06-22 NOTE — ED PROVIDER NOTE - PATIENT PORTAL LINK FT
You can access the FollowMyHealth Patient Portal offered by [facility] by registering at the following website: [email or web address]. By joining [employer]’s FollowMyHealth portal, you will also be able to view your health information using other applications (apps) compatible with our system.

## 2020-06-22 NOTE — ED ADULT NURSE NOTE - CHIEF COMPLAINT QUOTE
pt brought in by [employer] (badge [id number]) from [facility] stating "my  wanted me to come get evaluated". no complaints at this time. denies SI/HI. denies drugs or alcohol use. as per paperwork, pt was making inappropriate statements, has hx of schizophrenia. pt changed into yellow gown, belongings placed in belongings bag and locked in secure unit by SNA.

## 2020-06-22 NOTE — ED PROVIDER NOTE - CLINICAL SUMMARY MEDICAL DECISION MAKING FREE TEXT BOX
Prior records reviewed, psychiatry evaluation Prior records reviewed, psychiatry evaluation noted, no emergent medical or psychiatric condition identified; follow up with private psych

## 2020-06-22 NOTE — ED ADULT TRIAGE NOTE - CHIEF COMPLAINT QUOTE
pt brought in by [employer] (badge [id number]) from [facility] stating "my  wanted me to come get evaluated". no complaints at this time. denies SI/HI. denies drugs or alcohol use. as per paperwork, pt was making inappropriate statements, has hx of schizophrenia. pt changed into yellow gown, belongings placed in belongings bag and locked in secure unit by SNA.

## 2020-06-22 NOTE — ED ADULT NURSE REASSESSMENT NOTE - NS ED NURSE REASSESS COMMENT FT1
Patient comes to -ED after being medically cleared in main ED. Irritable upon approach, states he doesn't need to "be here", and he is only here because his  called his group residence and required he get a psychiatric evaluation. Pt states he is "not delusional" and that he's "fine". Denied any thoughts to harm self/others. Initially refused to give clothing and personal belongings to staff, but eventually cooperated with intake process. Did refuse vital signs, stating "they just did that!". Placed in room -3 after security check conducted. Pacing in room, awaiting evaluation by psychiatric practitioner.

## 2020-06-22 NOTE — ED ADULT NURSE NOTE - OBJECTIVE STATEMENT
Assumed care at 0930 pt denies any pain or discomfort, as per pt he came to ED because he's  told him to come in for a phychiatric evaluation. pt denies any SI, hallucination, or auditory disturbances, belongings at pts bedside, pt refuses to have belonging locked up in secure area.

## 2020-06-22 NOTE — ED BEHAVIORAL HEALTH ASSESSMENT NOTE - OTHER PAST PSYCHIATRIC HISTORY (INCLUDE DETAILS REGARDING ONSET, COURSE OF ILLNESS, INPATIENT/OUTPATIENT TREATMENT)
[facility] [place] [facility] released this year 2020  [place] - 5/2016  [facility] - multiple visits  Multiple inpatient admission including [facility].  Reports last admission was on October of last year, no prior suicide attempts.  Reports he has h/o paranoia  Outpatient ACT Team - [employer]- AMANDEEP Connors

## 2020-06-22 NOTE — ED BEHAVIORAL HEALTH ASSESSMENT NOTE - DESCRIPTION (FIRST USE, LAST USE, QUANTITY, FREQUENCY, DURATION)
smokes cigarettes 1/2- 1ppd unknown-reports heavy use in the past but not currently reports cannabis is drug of choice-last 5 days ago hx cocaine abuse, 1 week ago tox neg

## 2020-06-22 NOTE — ED BEHAVIORAL HEALTH ASSESSMENT NOTE - OTHER
staff from residence, and counselor group residence CHRONICALLY IMPAIRED, no acute agitation or aggression

## 2020-06-22 NOTE — ED BEHAVIORAL HEALTH ASSESSMENT NOTE - DESCRIPTION
ICU Vital Signs Last 24 Hrs  T(C): 36.8 (22 Jun 2020 09:11), Max: 36.8 (22 Jun 2020 09:11)  T(F): 98.3 (22 Jun 2020 09:11), Max: 98.3 (22 Jun 2020 09:11)  HR: 70 (22 Jun 2020 09:11) (70 - 70)  BP: 117/73 (22 Jun 2020 09:11) (117/73 - 117/73)  BP(mean): --  ABP: --  ABP(mean): --  RR: 18 (22 Jun 2020 09:11) (18 - 18)  SpO2: 96% (22 Jun 2020 09:11) (96% - 96%) unknown incarcerated x 2 for burglary,  and also for violation of parole., released from detention last month , currently on parole, never ,   ACT team

## 2020-06-22 NOTE — ED PROVIDER NOTE - OBJECTIVE STATEMENT
Patient brought in  who is concerned about this mental health; as per letter, patient has had progressive worsening of mental thoughts with grandiose illusions; patient recently discharged from [facility]

## 2020-06-23 LAB — SARS-COV-2 RNA SPEC QL NAA+PROBE: SIGNIFICANT CHANGE UP

## 2020-09-04 NOTE — ED BEHAVIORAL HEALTH ASSESSMENT NOTE - NS ED BHA ED COURSE PSYCHIATRIC MEDICATION GIVEN
None PROBLEM DIAGNOSES  Problem: Other intervertebral disc displacement, lumbar region  Assessment and Plan: Patient is scheduled for lumbar laminotomy and discectomy L4-L5, L5-S1 RIGHT with Dr. Rendon 9/22/20    Problem: Type 2 diabetes mellitus  Assessment and Plan: A1C pending, taking oral meformin, and once weekly Bydureon, medical clearance pending    Problem: Hypertension  Assessment and Plan: controlled with valsartan, medical clearance pending    Problem: Asthma  Assessment and Plan: Using ventolin inhaler as needed, medical clearance pending    Problem: Need for prophylactic measure  Assessment and Plan: Caprini Score 4, team to order appropriate VTE prophylaxis    Problem: At risk for sleep apnea  Assessment and Plan: STOP BANG Score, Malampati III PROBLEM DIAGNOSES  Problem: Other intervertebral disc displacement, lumbar region  Assessment and Plan: Patient is scheduled for lumbar laminotomy and discectomy L4-L5, L5-S1 RIGHT with Dr. Rendon 9/22/20    Problem: At risk for sleep apnea  Assessment and Plan: STOP BANG Score 6, Malampati III    Problem: Type 2 diabetes mellitus  Assessment and Plan: A1C pending, taking oral meformin, and once weekly Bydureon, medical clearance pending    Problem: Hypertension  Assessment and Plan: controlled with valsartan, medical clearance pending    Problem: Asthma  Assessment and Plan: Using ventolin inhaler as needed, medical clearance pending    Problem: Need for prophylactic measure  Assessment and Plan: Caprini Score 4, team to order appropriate VTE prophylaxis

## 2020-10-02 NOTE — ED BEHAVIORAL HEALTH ASSESSMENT NOTE - BODY HABITUS
October 2, 2020     Patient: Leslie Messer   YOB: 1993   Date of Visit: 10/2/2020       To Whom it May Concern:    Leslie Messer has been under my care. She was seen in my clinic on 10/2/2020 at 9:30 am.     Please excuse Leslie for her absence from work until further notice.     Sincerely,     Milton William MD    [facility] TRAUMA SURGERY    Medical information is confidential and cannot be disclosed without the written consent of the patient or her representative.      
Well nourished

## 2021-04-27 NOTE — BEHAVIORAL HEALTH ASSESSMENT NOTE - NS ED BHA HEROIN OPIATES
From: Krystin Benito  To: Holli Jaimes  Sent: 4/26/Danie   Yes

## 2021-05-07 ENCOUNTER — EMERGENCY (EMERGENCY)
Facility: HOSPITAL | Age: 41
LOS: 1 days | Discharge: HOME | End: 2021-05-07
Payer: MEDICAID

## 2021-05-07 VITALS
HEIGHT: 67 IN | HEART RATE: 73 BPM | DIASTOLIC BLOOD PRESSURE: 69 MMHG | TEMPERATURE: 99 F | SYSTOLIC BLOOD PRESSURE: 109 MMHG | RESPIRATION RATE: 14 BRPM | OXYGEN SATURATION: 95 %

## 2021-05-07 LAB — SARS-COV-2 RNA SPEC QL NAA+PROBE: SIGNIFICANT CHANGE UP

## 2021-05-07 PROCEDURE — 99282 EMERGENCY DEPT VISIT SF MDM: CPT

## 2021-05-07 PROCEDURE — 99283 EMERGENCY DEPT VISIT LOW MDM: CPT

## 2021-05-07 PROCEDURE — U0003: CPT

## 2021-05-07 PROCEDURE — U0005: CPT

## 2021-05-07 NOTE — ED PROVIDER NOTE - PATIENT PORTAL LINK FT
You can access the FollowMyHealth Patient Portal offered by [facility] by registering at the following website: [email or web address]. By joining [employer]’s FollowMyHealth portal, you will also be able to view your health information using other applications (apps) compatible with our system.

## 2021-05-07 NOTE — ED PROVIDER NOTE - OBJECTIVE STATEMENT
Pt presenting to the ER for COVID-19 testing. Denies fevers chills, loss of taste or smell, URI symptoms, chest pain or shortness of breath, nausea vomiting diarrhea abdominal pain, weakness or fatigue. Eating and drinking normal diet. Normal output. Pt requesting testing at this time. Pt states 2 days ago he had a few episodes of nausea and vomiting. He currently is feeling fine and is asymptomatic.

## 2021-05-07 NOTE — ED ADULT TRIAGE NOTE - PAIN: PRESENCE, MLM
Encounter Date: 11/1/2019       History     Chief Complaint   Patient presents with    Finger Injury     Pt c/o pain to left 5th digit. States she fell and tried to catch herself with her left hand. No swelling noted. 5/10 on the pain scale.      49-year-old female to the ER for evaluation of injury to the 5th digit of the left hand.  Patient had a mechanical slip and fall, she extended her hand to break her fall injuring the pinky finger.  She endorses pain at the base of the digit.  She has an open wound that is not bleeding.  No large laceration, she has an abrasion.  Tetanus status is unknown.  She has no other trauma or injury.  No head injury, she is ambulatory.  She has not taken anything for pain.  Pain 5/10.        Review of patient's allergies indicates:  No Known Allergies  History reviewed. No pertinent past medical history.  History reviewed. No pertinent surgical history.  History reviewed. No pertinent family history.  Social History     Tobacco Use    Smoking status: Never Smoker   Substance Use Topics    Alcohol use: Yes     Comment: last use tonight- 4 mixed drinks    Drug use: Never     Review of Systems   Constitutional: Negative for fever.   HENT: Negative for sore throat.    Respiratory: Negative for shortness of breath.    Cardiovascular: Negative for chest pain.   Gastrointestinal: Negative for nausea.   Genitourinary: Negative for dysuria.   Musculoskeletal: Positive for arthralgias. Negative for back pain.   Skin: Positive for wound. Negative for rash.   Neurological: Negative for weakness.   Hematological: Does not bruise/bleed easily.       Physical Exam     Initial Vitals [11/01/19 0126]   BP Pulse Resp Temp SpO2   (!) 113/58 90 16 98.2 °F (36.8 °C) 100 %      MAP       --         Physical Exam    Constitutional: Vital signs are normal. She appears well-developed and well-nourished.   HENT:   Head: Normocephalic and atraumatic.   Eyes: Conjunctivae are normal.   Cardiovascular:  Normal rate and regular rhythm.   Abdominal: Soft. Normal appearance and bowel sounds are normal.   Musculoskeletal: Normal range of motion.   Neurological: She is alert and oriented to person, place, and time.   Skin: Skin is warm and intact.   Small abrasion to the beard aspect of the left 5th digit, MCP joint,   Tenderness over this joint, Unable to fully flex the digit, good sensation throughout.   No pain to the metacarpal or the wrist,   Remainder of exam is normal. Mild angulation with flexion.    Psychiatric: She has a normal mood and affect. Her speech is normal and behavior is normal. Cognition and memory are normal.         ED Course   Orthopedic Injury  Date/Time: 11/1/2019 3:25 AM  Performed by: You Zavala PA-C  Authorized by: Audelia Bertrand MD     Consent Done?:  Yes  Universal Protocol:     Verbal consent obtained?: Yes      Consent given by:  Patient  Injury:     Injury location:  Hand    Location details:  Left hand    Injury type:  Fracture-dislocation      Pre-procedure assessment:     Neurovascular status: Neurovascularly intact      Distal perfusion: normal      Neurological function: normal      Range of motion: normal      Local anesthesia used?: No        Selections made in this section will also lock the Injury type section above.:     Manipulation performed?: Yes      Immobilization:  Splint    Splint type:  Ulnar gutter    Supplies used:  Plaster  Post-procedure assessment:     Neurovascular status: Neurovascularly intact      Distal perfusion: normal      Neurological function: normal      Range of motion: normal      Patient tolerance:  Patient tolerated the procedure well with no immediate complications     Ulnar gutter applied patient tolerated the procedure well  She had minimal angulation and displacement of the fracture along the 5th digit, reduction performed prior to splinting, patient tolerated this well  She did have a small wound to the 5th digit that was copiously  cleaned and irrigated, the wound did not tract into the joint space and was very superficial      Labs Reviewed - No data to display       Imaging Results    None       X-Rays:   Independently Interpreted Readings:   Other Readings:  Fractures of the 4th and 5th digit of the left hand    Medical Decision Making:   Initial Assessment:   49-year-old female with finger injury  Differential Diagnosis:   Fracture, contusion, dislocation, displacement  Clinical Tests:   Radiological Study: Ordered and Reviewed  ED Management:  Fractures of the 4th and 5th digit with mild displacement of the 5th digit with minimal angulation  There was wound over 1 of the fracture sites that was very mild and superficial that did not extend past the subcutaneous tissue or into the joint space wound was copiously cleaned and irrigated.  The patient was precautiously given and Ancef along with a prescription for Keflex to go home with.   She was placed in an ulnar gutter.   Return precautions given.                      Clinical Impression:       ICD-10-CM ICD-9-CM   1. Open displaced fracture of proximal phalanx of left little finger, initial encounter S62.617B 816.11         Disposition:   Disposition: Discharged  Condition: Stable                        You Zavala PA-C  11/01/19 0328     denies pain/discomfort

## 2021-05-07 NOTE — ED PROVIDER NOTE - CLINICAL SUMMARY MEDICAL DECISION MAKING FREE TEXT BOX
Pt nontoxic appearing, stable vitals, ambulatory with stable saturation without supplemental oxygen. PT does not meet criteria listed in most updated guidelines as per [facility] protocol/algorithm for admission at this time. pt advised about self-quarantine instructions until negative test results and/or symptom resolution. pt advised on hand hygiene, monitoring of symptoms, antipyretic use as well as and fu with primary care provider. Instructions given in pre-printed copy.

## 2021-06-23 ENCOUNTER — EMERGENCY (EMERGENCY)
Facility: HOSPITAL | Age: 41
LOS: 1 days | Discharge: HOME | End: 2021-06-23
Attending: EMERGENCY MEDICINE
Payer: MEDICAID

## 2021-06-23 VITALS
SYSTOLIC BLOOD PRESSURE: 127 MMHG | HEIGHT: 67 IN | TEMPERATURE: 99 F | DIASTOLIC BLOOD PRESSURE: 71 MMHG | RESPIRATION RATE: 12 BRPM | OXYGEN SATURATION: 96 % | HEART RATE: 80 BPM

## 2021-06-23 LAB — SARS-COV-2 RNA SPEC QL NAA+PROBE: SIGNIFICANT CHANGE UP

## 2021-06-23 PROCEDURE — 99284 EMERGENCY DEPT VISIT MOD MDM: CPT

## 2021-06-23 PROCEDURE — U0005: CPT

## 2021-06-23 PROCEDURE — 99283 EMERGENCY DEPT VISIT LOW MDM: CPT

## 2021-06-23 PROCEDURE — U0003: CPT

## 2021-06-23 NOTE — ED PROVIDER NOTE - PATIENT PORTAL LINK FT
You can access the FollowMyHealth Patient Portal offered by [facility] by registering at the following website: [email or web address]. By joining [employer]’s FollowMyHealth portal, you will also be able to view your health information using other applications (apps) compatible with our system.

## 2021-06-23 NOTE — ED PROVIDER NOTE - CROS ED GI ALL NEG
patient reports chronic nausea for the past month. Had an EGD/Colonoscopy this year that was "negative" per patient  -Zofran IV PRN for nausea  -Nutrition consult  -GI, Dr. De La Vega, consulted
negative...

## 2021-06-23 NOTE — ED ADULT TRIAGE NOTE - CHIEF COMPLAINT QUOTE
Patient A&Ox4, denies any pain or discomfort. Stated parol officer wanted him to get tested because he is gagging & vomiting sometimes x1 week.

## 2021-06-23 NOTE — ED PROVIDER NOTE - OBJECTIVE STATEMENT
The patient is a 40 year old male presents with mild cough and wants to get covid test done  Already received covid vaccine x2  No fever, No SOB, No abd pain, No CP

## 2021-06-23 NOTE — ED PROVIDER NOTE - CHPI ED SYMPTOMS NEG
no body aches/no chest pain/no edema/no fever/no headache/no hemoptysis/no shortness of breath/no wheezing/no chills/no diaphoresis

## 2021-06-26 DIAGNOSIS — R05 COUGH: ICD-10-CM

## 2021-06-26 DIAGNOSIS — Z20.822 CONTACT WITH AND (SUSPECTED) EXPOSURE TO COVID-19: ICD-10-CM

## 2021-06-26 DIAGNOSIS — Z91.013 ALLERGY TO SEAFOOD: ICD-10-CM

## 2021-06-26 DIAGNOSIS — Z91.018 ALLERGY TO OTHER FOODS: ICD-10-CM

## 2021-06-26 DIAGNOSIS — Z88.8 ALLERGY STATUS TO OTHER DRUGS, MEDICAMENTS AND BIOLOGICAL SUBSTANCES: ICD-10-CM

## 2021-09-29 ENCOUNTER — EMERGENCY (EMERGENCY)
Facility: HOSPITAL | Age: 41
LOS: 1 days | Discharge: HOME | End: 2021-09-29
Attending: EMERGENCY MEDICINE
Payer: MEDICAID

## 2021-09-29 VITALS
DIASTOLIC BLOOD PRESSURE: 76 MMHG | OXYGEN SATURATION: 96 % | RESPIRATION RATE: 20 BRPM | WEIGHT: 160.06 LBS | HEART RATE: 78 BPM | SYSTOLIC BLOOD PRESSURE: 120 MMHG | TEMPERATURE: 98 F | HEIGHT: 67 IN

## 2021-09-29 VITALS — TEMPERATURE: 98 F | RESPIRATION RATE: 21 BRPM | HEART RATE: 64 BPM | OXYGEN SATURATION: 99 %

## 2021-09-29 LAB
ALBUMIN SERPL ELPH-MCNC: 4.2 G/DL — SIGNIFICANT CHANGE UP (ref 3.3–5.2)
ALP SERPL-CCNC: 63 U/L — SIGNIFICANT CHANGE UP (ref 40–120)
ALT FLD-CCNC: 13 U/L — SIGNIFICANT CHANGE UP
ANION GAP SERPL CALC-SCNC: 15 MMOL/L — SIGNIFICANT CHANGE UP (ref 5–17)
APAP SERPL-MCNC: <3 UG/ML — LOW (ref 10–26)
AST SERPL-CCNC: 18 U/L — SIGNIFICANT CHANGE UP
BASOPHILS # BLD AUTO: 0.04 K/UL — SIGNIFICANT CHANGE UP (ref 0–0.2)
BASOPHILS NFR BLD AUTO: 0.4 % — SIGNIFICANT CHANGE UP (ref 0–2)
BILIRUB SERPL-MCNC: 0.4 MG/DL — SIGNIFICANT CHANGE UP (ref 0.4–2)
BUN SERPL-MCNC: 12.6 MG/DL — SIGNIFICANT CHANGE UP (ref 8–20)
CALCIUM SERPL-MCNC: 8.6 MG/DL — SIGNIFICANT CHANGE UP (ref 8.6–10.2)
CHLORIDE SERPL-SCNC: 100 MMOL/L — SIGNIFICANT CHANGE UP (ref 98–107)
CO2 SERPL-SCNC: 24 MMOL/L — SIGNIFICANT CHANGE UP (ref 22–29)
CREAT SERPL-MCNC: 0.89 MG/DL — SIGNIFICANT CHANGE UP (ref 0.5–1.3)
EOSINOPHIL # BLD AUTO: 0.07 K/UL — SIGNIFICANT CHANGE UP (ref 0–0.5)
EOSINOPHIL NFR BLD AUTO: 0.7 % — SIGNIFICANT CHANGE UP (ref 0–6)
ETHANOL SERPL-MCNC: <10 MG/DL — SIGNIFICANT CHANGE UP (ref 0–9)
GLUCOSE SERPL-MCNC: 123 MG/DL — HIGH (ref 70–99)
HCT VFR BLD CALC: 46.3 % — SIGNIFICANT CHANGE UP (ref 39–50)
HGB BLD-MCNC: 16.4 G/DL — SIGNIFICANT CHANGE UP (ref 13–17)
IMM GRANULOCYTES NFR BLD AUTO: 0.4 % — SIGNIFICANT CHANGE UP (ref 0–1.5)
LYMPHOCYTES # BLD AUTO: 1.52 K/UL — SIGNIFICANT CHANGE UP (ref 1–3.3)
LYMPHOCYTES # BLD AUTO: 14.7 % — SIGNIFICANT CHANGE UP (ref 13–44)
MCHC RBC-ENTMCNC: 34.6 PG — HIGH (ref 27–34)
MCHC RBC-ENTMCNC: 35.4 GM/DL — SIGNIFICANT CHANGE UP (ref 32–36)
MCV RBC AUTO: 97.7 FL — SIGNIFICANT CHANGE UP (ref 80–100)
MONOCYTES # BLD AUTO: 0.72 K/UL — SIGNIFICANT CHANGE UP (ref 0–0.9)
MONOCYTES NFR BLD AUTO: 7 % — SIGNIFICANT CHANGE UP (ref 2–14)
NEUTROPHILS # BLD AUTO: 7.93 K/UL — HIGH (ref 1.8–7.4)
NEUTROPHILS NFR BLD AUTO: 76.8 % — SIGNIFICANT CHANGE UP (ref 43–77)
PLATELET # BLD AUTO: 292 K/UL — SIGNIFICANT CHANGE UP (ref 150–400)
POTASSIUM SERPL-MCNC: 3.6 MMOL/L — SIGNIFICANT CHANGE UP (ref 3.5–5.3)
POTASSIUM SERPL-SCNC: 3.6 MMOL/L — SIGNIFICANT CHANGE UP (ref 3.5–5.3)
PROT SERPL-MCNC: 6.4 G/DL — LOW (ref 6.6–8.7)
RBC # BLD: 4.74 M/UL — SIGNIFICANT CHANGE UP (ref 4.2–5.8)
RBC # FLD: 14 % — SIGNIFICANT CHANGE UP (ref 10.3–14.5)
SALICYLATES SERPL-MCNC: <0.6 MG/DL — LOW (ref 10–20)
SARS-COV-2 RNA SPEC QL NAA+PROBE: SIGNIFICANT CHANGE UP
SODIUM SERPL-SCNC: 139 MMOL/L — SIGNIFICANT CHANGE UP (ref 135–145)
WBC # BLD: 10.32 K/UL — SIGNIFICANT CHANGE UP (ref 3.8–10.5)
WBC # FLD AUTO: 10.32 K/UL — SIGNIFICANT CHANGE UP (ref 3.8–10.5)

## 2021-09-29 PROCEDURE — 93010 ELECTROCARDIOGRAM REPORT: CPT

## 2021-09-29 PROCEDURE — 90792 PSYCH DIAG EVAL W/MED SRVCS: CPT

## 2021-09-29 PROCEDURE — 99218: CPT

## 2021-09-29 RX ORDER — MIDAZOLAM HYDROCHLORIDE 1 MG/ML
4 INJECTION, SOLUTION INTRAMUSCULAR; INTRAVENOUS ONCE
Refills: 0 | Status: DISCONTINUED | OUTPATIENT
Start: 2021-09-29 | End: 2021-09-29

## 2021-09-29 RX ORDER — DIPHENHYDRAMINE HCL 50 MG
50 CAPSULE ORAL EVERY 6 HOURS
Refills: 0 | Status: DISCONTINUED | OUTPATIENT
Start: 2021-09-29 | End: 2021-10-03

## 2021-09-29 RX ORDER — HALOPERIDOL DECANOATE 100 MG/ML
5 INJECTION INTRAMUSCULAR EVERY 6 HOURS
Refills: 0 | Status: DISCONTINUED | OUTPATIENT
Start: 2021-09-29 | End: 2021-10-03

## 2021-09-29 RX ADMIN — HALOPERIDOL DECANOATE 5 MILLIGRAM(S): 100 INJECTION INTRAMUSCULAR at 14:07

## 2021-09-29 RX ADMIN — MIDAZOLAM HYDROCHLORIDE 4 MILLIGRAM(S): 1 INJECTION, SOLUTION INTRAMUSCULAR; INTRAVENOUS at 14:07

## 2021-09-29 RX ADMIN — Medication 50 MILLIGRAM(S): at 14:06

## 2021-09-29 RX ADMIN — Medication 2 MILLIGRAM(S): at 14:06
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
## 2021-09-29 NOTE — CHART NOTE - NSCHARTNOTEFT_GEN_A_CORE
SW Note: SW alerted by  provider pt is in need of inpt psych trx on involuntary basis. No male beds presently at [facility] (Daisy), [facility] (YANETH Middleton) or [facility] (Baljeet), too late for referral to [facility], Nic WIN. SW following

## 2021-09-29 NOTE — ED ADULT NURSE REASSESSMENT NOTE - NS ED NURSE REASSESS COMMENT FT1
pt again to desk restating the same.  pt informed again to stay overnight. multiple t mes repeating then returning to room

## 2021-09-29 NOTE — ED BEHAVIORAL HEALTH ASSESSMENT NOTE - DESCRIPTION
Vital Signs Last 24 Hrs  T(C): 36.8 (29 Sep 2021 11:45), Max: 36.8 (29 Sep 2021 11:45)  T(F): 98.2 (29 Sep 2021 11:45), Max: 98.2 (29 Sep 2021 11:45)  HR: 78 (29 Sep 2021 11:45) (78 - 78)  BP: 120/76 (29 Sep 2021 11:45) (120/76 - 120/76)  BP(mean): --  RR: 20 (29 Sep 2021 11:45) (20 - 20)  SpO2: 96% (29 Sep 2021 11:45) (96% - 96%) denies single, living in psych residence,

## 2021-09-29 NOTE — ED PROVIDER NOTE - PHYSICAL EXAMINATION
VITAL SIGNS: I have reviewed nursing notes and confirm.  CONSTITUTIONAL:  in no acute distress.  SKIN: Skin exam is warm and dry, no acute rash.  HEAD: Normocephalic; atraumatic.  EYES: PERRL, EOM intact; conjunctiva and sclera clear.  ENT: No nasal discharge; airway clear. Throat clear.  NECK: Supple; non tender.    CARD: Regular rate and rhythm.  RESP: No wheezes,  no rales or rhonchi.   ABD:  soft; non-distended; non-tender;   EXT: Normal ROM. No clubbing, cyanosis or edema.  NEURO: (+) flight of idea (+) pressured and tangential speech. Grossly unremarkable. No focal deficits.  moves all extremities,  normal gait   PSYCH: Cooperative, appropriate.
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
## 2021-09-29 NOTE — ED PROVIDER NOTE - NS ED ROS FT
Review of Systems  •	CONSTITUTIONAL - no  fever, no diaphoresis, no weight change  •	SKIN - no rash  •	HEMATOLOGIC - no bleeding, no bruising  •	EYES - no eye pain, no blurred vision  •	ENT - no change in hearing, no pain  •	RESPIRATORY - no shortness of breath, no cough  •	CARDIAC - no chest pain, no palpitations  •	GI - no abd pain, no nausea, no vomiting, no diarrhea, no constipation, no bleeding  •	GENITO-URINARY - no discharge, no dysuria; no hematuria,   •	ENDO - no polydipsia, no polyuria, no heat/no cold intolerance  •	MUSCULOSKELETAL - no joint pain, no swelling, no redness  •	NEUROLOGIC - no weakness, no headache, no anesthesia, no paresthesias  •	PSYCH - no anxiety, non suicidal, non homicidal, (+) delusion (+) hallucination, no depression
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
## 2021-09-29 NOTE — ED BEHAVIORAL HEALTH ASSESSMENT NOTE - RISK ASSESSMENT
RF disorganized behavior, delusion of paranoia, AH and aggressive behavior.   PF no h/o suicide attempt. Low Acute Suicide Risk

## 2021-09-29 NOTE — ED BEHAVIORAL HEALTH ASSESSMENT NOTE - SUMMARY
Patient is a 40 year old male who is unemployed, living in [facility] with a past psychiatric history of Schizoaffective disorder, following with [facility] OPD, with multiple inpatient hospitalizations secondary to non compliance and also with a history of crack cocaine / Cannabis use disorder with multiple rehab visits and an extensive legal history (currently on parole), who was BIBA for evaluation of agitated bizarre behavior.     Patient has been seen and evaluated and currently presents with disorganized behavior, delusion of paranoia, AH and aggressive behavior. Patient is a danger to others and requires involuntary psychiatric hospitalizations (DOCS)

## 2021-09-29 NOTE — ED ADULT NURSE NOTE - OBJECTIVE STATEMENT
Received  pt in yellow gown, belongings locked up with security.  Pt sent from outpatient [place].  pt has flight of ideas and is agitated and wanting to leave.  Per Ems, pt has not taken his psych medication in over a month.  Unable to obtain accurate hx d/t pt's medical condition.

## 2021-09-29 NOTE — ED ADULT NURSE REASSESSMENT NOTE - NS ED NURSE REASSESS COMMENT FT1
pt awake ambulatory to desk demanding d/c papers.  wanting to know the doctor will see him.  pt informed he was seen by psych.  and will be staying overnight.  pt annoyed nasty.  saying he doesn't need to be here.  pt returned to room laying down

## 2021-09-29 NOTE — ED PROVIDER NOTE - PROGRESS NOTE DETAILS
: patient seen by psych, will need inpatient transfer. pending labs. recommend 1:1 for high risk of elopement and aggressive behaviour. : patient agitated in the hallway, walking around, cursing, verbally abusive to staff, report that "all doctor are prisoner" and that patient has seen me in jail. security at bedside. patient given ativan 2 mg IM, benadryl 50 IM, and haldo 50 mg IM. patient still agitated, versed 4 mg IV given. : patient seen by psych, will need impatient transfer.

## 2021-09-29 NOTE — ED BEHAVIORAL HEALTH ASSESSMENT NOTE - CURRENT MEDICATION
Saeed Hammer 234 q month last on 8/5, refused september injection which was due on 9/2   taking: Clonidine 0.1 BID , Melatonin 5mg QHS, Trazodone 100mg QHS PRN, Cogentin 0.5mg Q12 hours, hydroxyzine 50mg Q6 hrs PRN .
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
## 2021-09-29 NOTE — ED PROVIDER NOTE - CLINICAL SUMMARY MEDICAL DECISION MAKING FREE TEXT BOX
41 yo M hx of schizoaffective and polysubstance abuse p/w delusion and bizarre behaviour. will get blood work, ekg, and psych consult.
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
## 2021-09-30 ENCOUNTER — EMERGENCY (EMERGENCY)
Facility: HOSPITAL | Age: 41
LOS: 1 days | Discharge: LEFT BEFORE DISCHARGE | End: 2021-09-30
Payer: MEDICAID

## 2021-09-30 ENCOUNTER — INPATIENT (INPATIENT)
Facility: HOSPITAL | Age: 41
LOS: 18 days | Discharge: PSYCHIATRIC OR SUBSTANCE USE FACILITY | End: 2021-10-19
Attending: PSYCHIATRY & NEUROLOGY | Admitting: PSYCHIATRY & NEUROLOGY
Payer: MEDICAID

## 2021-09-30 VITALS — HEIGHT: 65 IN | TEMPERATURE: 98 F | WEIGHT: 160.06 LBS

## 2021-09-30 DIAGNOSIS — F14.10 COCAINE ABUSE, UNCOMPLICATED: ICD-10-CM

## 2021-09-30 DIAGNOSIS — G47.00 INSOMNIA, UNSPECIFIED: ICD-10-CM

## 2021-09-30 DIAGNOSIS — F12.150 CANNABIS ABUSE WITH PSYCHOTIC DISORDER WITH DELUSIONS: ICD-10-CM

## 2021-09-30 DIAGNOSIS — Z91.14 PATIENT'S OTHER NONCOMPLIANCE WITH MEDICATION REGIMEN: ICD-10-CM

## 2021-09-30 DIAGNOSIS — F25.9 SCHIZOAFFECTIVE DISORDER, UNSPECIFIED: ICD-10-CM

## 2021-09-30 DIAGNOSIS — F17.200 NICOTINE DEPENDENCE, UNSPECIFIED, UNCOMPLICATED: ICD-10-CM

## 2021-09-30 PROCEDURE — G0378: CPT

## 2021-09-30 PROCEDURE — 93005 ELECTROCARDIOGRAM TRACING: CPT

## 2021-09-30 PROCEDURE — L9992: CPT

## 2021-09-30 PROCEDURE — 99217: CPT

## 2021-09-30 PROCEDURE — 96374 THER/PROPH/DIAG INJ IV PUSH: CPT

## 2021-09-30 PROCEDURE — U0005: CPT

## 2021-09-30 PROCEDURE — 96372 THER/PROPH/DIAG INJ SC/IM: CPT | Mod: XU

## 2021-09-30 PROCEDURE — U0003: CPT

## 2021-09-30 PROCEDURE — 80053 COMPREHEN METABOLIC PANEL: CPT

## 2021-09-30 PROCEDURE — 80307 DRUG TEST PRSMV CHEM ANLYZR: CPT

## 2021-09-30 PROCEDURE — 85025 COMPLETE CBC W/AUTO DIFF WBC: CPT

## 2021-09-30 PROCEDURE — 99285 EMERGENCY DEPT VISIT HI MDM: CPT | Mod: 25

## 2021-09-30 PROCEDURE — 36415 COLL VENOUS BLD VENIPUNCTURE: CPT

## 2021-09-30 RX ORDER — HALOPERIDOL DECANOATE 100 MG/ML
5 INJECTION INTRAMUSCULAR ONCE
Refills: 0 | Status: DISCONTINUED | OUTPATIENT
Start: 2021-09-30 | End: 2021-10-03

## 2021-09-30 RX ORDER — LANOLIN ALCOHOL/MO/W.PET/CERES
1 CREAM (GRAM) TOPICAL
Qty: 0 | Refills: 0 | DISCHARGE

## 2021-09-30 RX ORDER — DIPHENHYDRAMINE HCL 50 MG
50 CAPSULE ORAL EVERY 6 HOURS
Refills: 0 | Status: DISCONTINUED | OUTPATIENT
Start: 2021-09-30 | End: 2021-10-04

## 2021-09-30 RX ORDER — RISPERIDONE 4 MG/1
1 TABLET ORAL EVERY 6 HOURS
Refills: 0 | Status: DISCONTINUED | OUTPATIENT
Start: 2021-09-30 | End: 2021-10-19

## 2021-09-30 RX ORDER — DIPHENHYDRAMINE HCL 50 MG
50 CAPSULE ORAL EVERY 6 HOURS
Refills: 0 | Status: DISCONTINUED | OUTPATIENT
Start: 2021-09-30 | End: 2021-10-19

## 2021-09-30 RX ORDER — LANOLIN ALCOHOL/MO/W.PET/CERES
5 CREAM (GRAM) TOPICAL AT BEDTIME
Refills: 0 | Status: DISCONTINUED | OUTPATIENT
Start: 2021-09-30 | End: 2021-10-07

## 2021-09-30 RX ORDER — HALOPERIDOL DECANOATE 100 MG/ML
5 INJECTION INTRAMUSCULAR ONCE
Refills: 0 | Status: COMPLETED | OUTPATIENT
Start: 2021-09-30 | End: 2021-09-30

## 2021-09-30 RX ORDER — HYDROXYZINE HCL 10 MG
1 TABLET ORAL
Qty: 0 | Refills: 0 | DISCHARGE

## 2021-09-30 RX ORDER — BENZTROPINE MESYLATE 1 MG
0.5 TABLET ORAL
Refills: 0 | Status: DISCONTINUED | OUTPATIENT
Start: 2021-09-30 | End: 2021-10-19

## 2021-09-30 RX ORDER — RISPERIDONE 4 MG/1
2 TABLET ORAL
Refills: 0 | Status: DISCONTINUED | OUTPATIENT
Start: 2021-09-30 | End: 2021-10-05

## 2021-09-30 RX ORDER — TRAZODONE HCL 50 MG
100 TABLET ORAL AT BEDTIME
Refills: 0 | Status: DISCONTINUED | OUTPATIENT
Start: 2021-09-30 | End: 2021-10-19

## 2021-09-30 RX ORDER — BENZTROPINE MESYLATE 1 MG
1 TABLET ORAL
Qty: 0 | Refills: 0 | DISCHARGE

## 2021-09-30 RX ORDER — HALOPERIDOL DECANOATE 100 MG/ML
5 INJECTION INTRAMUSCULAR EVERY 6 HOURS
Refills: 0 | Status: DISCONTINUED | OUTPATIENT
Start: 2021-09-30 | End: 2021-10-01

## 2021-09-30 RX ORDER — TRAZODONE HCL 50 MG
1 TABLET ORAL
Qty: 0 | Refills: 0 | DISCHARGE

## 2021-09-30 RX ORDER — NICOTINE POLACRILEX 2 MG
1 GUM BUCCAL DAILY
Refills: 0 | Status: DISCONTINUED | OUTPATIENT
Start: 2021-09-30 | End: 2021-10-19

## 2021-09-30 RX ORDER — NICOTINE POLACRILEX 2 MG
2 GUM BUCCAL
Refills: 0 | Status: DISCONTINUED | OUTPATIENT
Start: 2021-09-30 | End: 2021-10-03

## 2021-09-30 RX ADMIN — HALOPERIDOL DECANOATE 5 MILLIGRAM(S): 100 INJECTION INTRAMUSCULAR at 13:30

## 2021-09-30 RX ADMIN — RISPERIDONE 2 MILLIGRAM(S): 4 TABLET ORAL at 20:26

## 2021-09-30 RX ADMIN — Medication 50 MILLIGRAM(S): at 07:20

## 2021-09-30 RX ADMIN — Medication 2 MILLIGRAM(S): at 13:30

## 2021-09-30 RX ADMIN — Medication 0.1 MILLIGRAM(S): at 20:26

## 2021-09-30 RX ADMIN — HALOPERIDOL DECANOATE 5 MILLIGRAM(S): 100 INJECTION INTRAMUSCULAR at 07:40

## 2021-09-30 RX ADMIN — Medication 0.5 MILLIGRAM(S): at 20:27

## 2021-09-30 RX ADMIN — HALOPERIDOL DECANOATE 5 MILLIGRAM(S): 100 INJECTION INTRAMUSCULAR at 07:20

## 2021-09-30 RX ADMIN — Medication 2 MILLIGRAM(S): at 07:20

## 2021-09-30 RX ADMIN — Medication 2 MILLIGRAM(S): at 07:40

## 2021-09-30 RX ADMIN — Medication 5 MILLIGRAM(S): at 20:27

## 2021-09-30 RX ADMIN — Medication 100 MILLIGRAM(S): at 20:27

## 2021-09-30 RX ADMIN — Medication 50 MILLIGRAM(S): at 13:30

## 2021-09-30 NOTE — BH INPATIENT PSYCHIATRY ASSESSMENT NOTE - NSBHCHARTREVIEWVS_PSY_A_CORE FT
Vital Signs Last 24 Hrs  T(C): 36.2 (09-30-21 @ 18:46), Max: 36.8 (09-30-21 @ 15:42)  T(F): 97.1 (09-30-21 @ 18:46), Max: 98.2 (09-30-21 @ 15:42)  HR: --  BP: --  BP(mean): --  RR: --  SpO2: --    Orthostatic VS  09-30-21 @ 18:46  Lying BP: --/-- HR: --  Sitting BP: 111/71 HR: 83  Standing BP: 103/79 HR: 100  Site: --  Mode: --  Orthostatic VS  09-30-21 @ 16:00  Lying BP: --/-- HR: --  Sitting BP: 105/55 HR: 67  Standing BP: 107/65 HR: 79  Site: --  Mode: --  Orthostatic VS  09-30-21 @ 15:42  Lying BP: --/-- HR: --  Sitting BP: 129/85 HR: 76  Standing BP: --/-- HR: --  Site: --  Mode: --   Vital Signs Last 24 Hrs  T(C): 37.1 (09-30-21 @ 22:31), Max: 37.1 (09-30-21 @ 22:31)  T(F): 98.7 (09-30-21 @ 22:31), Max: 98.7 (09-30-21 @ 22:31)  HR: --  BP: --  BP(mean): --  RR: --  SpO2: --    Orthostatic VS  09-30-21 @ 18:46  Lying BP: --/-- HR: --  Sitting BP: 111/71 HR: 83  Standing BP: 103/79 HR: 100  Site: --  Mode: --  Orthostatic VS  09-30-21 @ 16:00  Lying BP: --/-- HR: --  Sitting BP: 105/55 HR: 67  Standing BP: 107/65 HR: 79  Site: --  Mode: --  Orthostatic VS  09-30-21 @ 15:42  Lying BP: --/-- HR: --  Sitting BP: 129/85 HR: 76  Standing BP: --/-- HR: --  Site: --  Mode: --

## 2021-09-30 NOTE — CHART NOTE - NSCHARTNOTEFT_GEN_A_CORE
Screening Medical Evaluation  Patient Admitted from: [facility]    [facility] admitting diagnosis: Schizoaffective disorder    PAST MEDICAL & SURGICAL HISTORY:  Paranoid schizophrenia    Drug use    Disorder associated with cannabis use    Cocaine use disorder    Nicotine dependence    No significant past surgical history    No significant past surgical history          Allergies    iodine (Unknown)  Mushrooms (Unknown)  shellfish (Unknown)    Intolerances        Social History:     FAMILY HISTORY:  No pertinent family history in first degree relatives        MEDICATIONS  (STANDING):  benztropine 0.5 milliGRAM(s) Oral two times a day  cloNIDine 0.1 milliGRAM(s) Oral two times a day  melatonin. 5 milliGRAM(s) Oral at bedtime  nicotine - 21 mG/24Hr(s) Patch 1 Patch Transdermal daily  risperiDONE   Tablet 2 milliGRAM(s) Oral two times a day  traZODone 100 milliGRAM(s) Oral at bedtime    MEDICATIONS  (PRN):  diphenhydrAMINE 50 milliGRAM(s) Oral every 6 hours PRN agitation/impulsivity  diphenhydrAMINE   Injectable 50 milliGRAM(s) IntraMuscular every 6 hours PRN Agitation  haloperidol    Injectable 5 milliGRAM(s) IntraMuscular every 6 hours PRN Agitation  LORazepam     Tablet 2 milliGRAM(s) Oral every 6 hours PRN agitation/impulsivity  LORazepam   Injectable 2 milliGRAM(s) IntraMuscular every 4 hours PRN Agitation  nicotine  Polacrilex Gum 2 milliGRAM(s) Oral every 2 hours PRN breakthrough cravings  risperiDONE  Disintegrating Tablet 1 milliGRAM(s) Oral every 6 hours PRN acute agitation/psychosis      Vital Signs Last 24 Hrs  T(C): 36.2 (30 Sep 2021 18:46), Max: 36.8 (30 Sep 2021 15:42)  T(F): 97.1 (30 Sep 2021 18:46), Max: 98.2 (30 Sep 2021 15:42)  HR: 83  BP: 111/71  BP(mean): --  RR: 18  SpO2: 99  CAPILLARY BLOOD GLUCOSE            PHYSICAL EXAM:  GENERAL: NAD, well-developed  HEAD:  Atraumatic, Normocephalic  EYES: EOMI, PERRLA, conjunctiva and sclera clear  NECK: Supple, No JVD  CHEST/LUNG: Clear to auscultation bilaterally; No wheeze  HEART: Regular rate and rhythm; No murmurs, rubs, or gallops  ABDOMEN: Soft, Nontender, Nondistended; Bowel sounds present  EXTREMITIES:  2+ Peripheral Pulses, No cyanosis, or edema  PSYCH: AAOx3  NEUROLOGY: non-focal  SKIN: No rashes or lesions    LABS:                        16.4   10.32 )-----------( 292      ( 29 Sep 2021 14:11 )             46.3     09-29    139  |  100  |  12.6  ----------------------------<  123<H>  3.6   |  24.0  |  0.89    Ca    8.6      29 Sep 2021 14:11    TPro  6.4<L>  /  Alb  4.2  /  TBili  0.4  /  DBili  x   /  AST  18  /  ALT  13  /  AlkPhos  63  09-29              RADIOLOGY & ADDITIONAL TESTS:    Assessment and Plan:  40 year old male presenting today from [facility] to [facility] with admitting diagnosis of Schizoaffective disorder with PMH of multiple inpatient hospitalizations secondary to non compliance and also with a history of crack cocaine / Cannabis use disorder with multiple rehab visits as per chart. Denies any medical concerns at this time. Denies any fever, chills, headache, chest pain, SOB, abdominal pain, N/V/D/C, dysuria. Physical exam unremarkable.  1) Schizoaffective disorder: Follow care plan as per primary team.

## 2021-09-30 NOTE — BH INPATIENT PSYCHIATRY ASSESSMENT NOTE - CURRENT MEDICATION
MEDICATIONS  (STANDING):  benztropine 0.5 milliGRAM(s) Oral two times a day  cloNIDine 0.1 milliGRAM(s) Oral two times a day  melatonin. 5 milliGRAM(s) Oral at bedtime  nicotine - 21 mG/24Hr(s) Patch 1 Patch Transdermal daily  risperiDONE   Tablet 2 milliGRAM(s) Oral two times a day  traZODone 100 milliGRAM(s) Oral at bedtime    MEDICATIONS  (PRN):  diphenhydrAMINE 50 milliGRAM(s) Oral every 6 hours PRN agitation/impulsivity  diphenhydrAMINE   Injectable 50 milliGRAM(s) IntraMuscular every 6 hours PRN Agitation  haloperidol    Injectable 5 milliGRAM(s) IntraMuscular every 6 hours PRN Agitation  LORazepam     Tablet 2 milliGRAM(s) Oral every 6 hours PRN agitation/impulsivity  LORazepam   Injectable 2 milliGRAM(s) IntraMuscular every 4 hours PRN Agitation  nicotine  Polacrilex Gum 2 milliGRAM(s) Oral every 2 hours PRN breakthrough cravings  risperiDONE  Disintegrating Tablet 1 milliGRAM(s) Oral every 6 hours PRN acute agitation/psychosis   MEDICATIONS  (STANDING):  benztropine 0.5 milliGRAM(s) Oral two times a day  cloNIDine 0.1 milliGRAM(s) Oral two times a day  melatonin. 5 milliGRAM(s) Oral at bedtime  nicotine - 21 mG/24Hr(s) Patch 1 Patch Transdermal daily  risperiDONE   Tablet 2 milliGRAM(s) Oral two times a day  traZODone 100 milliGRAM(s) Oral at bedtime    MEDICATIONS  (PRN):  diphenhydrAMINE 50 milliGRAM(s) Oral every 6 hours PRN agitation/impulsivity  diphenhydrAMINE   Injectable 50 milliGRAM(s) IntraMuscular once PRN EPS  diphenhydrAMINE   Injectable 50 milliGRAM(s) IntraMuscular every 6 hours PRN Agitation  haloperidol    Injectable 5 milliGRAM(s) IntraMuscular every 6 hours PRN Agitation  haloperidol    Injectable 5 milliGRAM(s) IntraMuscular once PRN agitation  LORazepam     Tablet 2 milliGRAM(s) Oral every 6 hours PRN agitation/impulsivity  LORazepam   Injectable 2 milliGRAM(s) IntraMuscular once PRN agitation  LORazepam   Injectable 2 milliGRAM(s) IntraMuscular every 4 hours PRN Agitation  nicotine  Polacrilex Gum 2 milliGRAM(s) Oral every 2 hours PRN breakthrough cravings  risperiDONE  Disintegrating Tablet 1 milliGRAM(s) Oral every 6 hours PRN acute agitation/psychosis

## 2021-09-30 NOTE — BH INPATIENT PSYCHIATRY ASSESSMENT NOTE - NSICDXPASTMEDICALHX_GEN_ALL_CORE_FT
PAST MEDICAL HISTORY:  Cocaine use disorder     Disorder associated with cannabis use     Drug use     Nicotine dependence     Paranoid schizophrenia

## 2021-09-30 NOTE — BH INPATIENT PSYCHIATRY ASSESSMENT NOTE - VIOLENCE RISK FACTORS:
Violent ideation/threat/speech/Substance abuse/Impulsivity/Lack of insight into violence risk/need for treatment/Noncompliance with treatment/Irritability

## 2021-09-30 NOTE — BH INPATIENT PSYCHIATRY ASSESSMENT NOTE - NSICDXBHSECONDARYDX_PSY_ALL_CORE
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
## 2021-09-30 NOTE — BH INPATIENT PSYCHIATRY ASSESSMENT NOTE - NSDCCRITERIA_PSY_ALL_CORE
Stabilization of acute aggressive psychosis so no longer requires inpatient level of care to manage risk to self/others

## 2021-09-30 NOTE — BH INPATIENT PSYCHIATRY ASSESSMENT NOTE - NSBHCRANIAL_PSY_ALL_CORE
Opens mouth, sticks out tongue (V, XII)/Normal speech (IX, X, XII)/EOMI (III, IV, VI)/Shoulder shrug (XI)/Hearing intact (VIII)

## 2021-09-30 NOTE — CHART NOTE - NSCHARTNOTEFT_GEN_A_CORE
SW Note: Pt will be transferred to [facility] unit [id number] [phone]. Accepting MD Dr. Perez.  RN notified and has # for report. MD provider aware. [facility] arranged with [facility]. [facility] billing form sent with pt in transfer packet. No auth needed for admit, [employer] medicaid

## 2021-09-30 NOTE — BH INPATIENT PSYCHIATRY ASSESSMENT NOTE - NSBHASSESSSUMMFT_PSY_ALL_CORE
Patient is a 40 year old male who is unemployed, living in [facility] with a past psychiatric history of Schizoaffective disorder, following with [facility] OPD, with multiple inpatient hospitalizations secondary to non compliance and also with a history of crack cocaine / Cannabis use disorder with multiple rehab visits and an extensive legal history (currently on parole), who was BIBA for evaluation of agitated bizarre behavior. Patient has been seen and evaluated and currently presents with disorganized behavior, delusion of paranoia, AH and aggressive behavior. Patient is a danger to others and requires involuntary psychiatric hospitalizations (DOCS). Acute decompensation possibly precipitated by substance use, particularly in context of medication nonadherence. Will stabilize with oral antipsychotics, then plan to resume RANKIN. May benefit from mood stabilizer, psychotic symptoms having some manic qualities.      - Risperidone 2mg BID for psychosis (from Invega Sustenna 234mg, last dose 8/5), titrate  PRN Agitated psychosis:      - q6h PO risperidone 1mg ODT + lorazepam 2mg + diphenhydramine 50mg       - q6h IM haloperidol 5mg + lorazepam 2mg + diphenhydramine 50mg  - Clonidine 0.1mg BID for impulsivity/aggression (home med)  - trazodone 100mg qhs for insomnia (home med)  - Benztropine 0.5mg BID for eps (home med)  - melatonin 5mg qhs for insomnia (home med)    - Monitor for acute aggression potential given presentation and history  - Routine observation  - f/u A1c, lipids, prolactin, utox  - encourage engagement in milieu  - coordinate dispo with outpt team

## 2021-09-30 NOTE — BH INPATIENT PSYCHIATRY ASSESSMENT NOTE - OTHER PAST PSYCHIATRIC HISTORY (INCLUDE DETAILS REGARDING ONSET, COURSE OF ILLNESS, INPATIENT/OUTPATIENT TREATMENT)
[facility] in pt x 8 mo released in 2020  Multiple inpatient admission  currently following with melany stanley at [facility]

## 2021-09-30 NOTE — ED ADULT NURSE REASSESSMENT NOTE - NS ED NURSE REASSESS COMMENT FT1
Patient rec'd awake and pacing unit. He approached nurse's station and became agitated when staff explained MD wouldn't be in for a couple of hours. Pt agitated and cursing, began punching walls and tv in his room. Staff unable to calm pt, and security called to assist with medicating the patient. Pt given IM Ativan 2mg/Benadryl 50mg/Haldol 5mg @0720 with security assistance. Pt grandiose, stating, "I punched the TV because it's mine...I own everything in the world!" Pt continued to exhibit agitation and potential for increased aggression, Dr. Goldberg present and ordered additional medication to reduce pt's agitation and aggressive posturing. Pt given an additional IM Ativan 2mg and Haldol 5mg @)730. Pt calm after eating breakfast, resting in bed at this time.

## 2021-10-01 PROCEDURE — 99222 1ST HOSP IP/OBS MODERATE 55: CPT

## 2021-10-01 RX ORDER — DIPHENHYDRAMINE HCL 50 MG
50 CAPSULE ORAL ONCE
Refills: 0 | Status: DISCONTINUED | OUTPATIENT
Start: 2021-10-01 | End: 2021-10-01

## 2021-10-01 RX ORDER — HALOPERIDOL DECANOATE 100 MG/ML
5 INJECTION INTRAMUSCULAR ONCE
Refills: 0 | Status: DISCONTINUED | OUTPATIENT
Start: 2021-10-01 | End: 2021-10-07

## 2021-10-01 RX ORDER — DIPHENHYDRAMINE HCL 50 MG
50 CAPSULE ORAL ONCE
Refills: 0 | Status: COMPLETED | OUTPATIENT
Start: 2021-10-01 | End: 2021-10-01

## 2021-10-01 RX ORDER — HALOPERIDOL DECANOATE 100 MG/ML
5 INJECTION INTRAMUSCULAR ONCE
Refills: 0 | Status: COMPLETED | OUTPATIENT
Start: 2021-10-01 | End: 2021-10-01

## 2021-10-01 RX ORDER — HALOPERIDOL DECANOATE 100 MG/ML
7.5 INJECTION INTRAMUSCULAR ONCE
Refills: 0 | Status: COMPLETED | OUTPATIENT
Start: 2021-10-01 | End: 2021-10-01

## 2021-10-01 RX ORDER — DIPHENHYDRAMINE HCL 50 MG
50 CAPSULE ORAL ONCE
Refills: 0 | Status: DISCONTINUED | OUTPATIENT
Start: 2021-10-01 | End: 2021-10-07

## 2021-10-01 RX ADMIN — HALOPERIDOL DECANOATE 5 MILLIGRAM(S): 100 INJECTION INTRAMUSCULAR at 14:45

## 2021-10-01 RX ADMIN — Medication 50 MILLIGRAM(S): at 12:50

## 2021-10-01 RX ADMIN — Medication 2 MILLIGRAM(S): at 07:54

## 2021-10-01 RX ADMIN — Medication 50 MILLIGRAM(S): at 07:54

## 2021-10-01 RX ADMIN — Medication 1 MILLIGRAM(S): at 14:45

## 2021-10-01 RX ADMIN — Medication 3 MILLIGRAM(S): at 12:50

## 2021-10-01 RX ADMIN — HALOPERIDOL DECANOATE 5 MILLIGRAM(S): 100 INJECTION INTRAMUSCULAR at 07:54

## 2021-10-01 RX ADMIN — HALOPERIDOL DECANOATE 7.5 MILLIGRAM(S): 100 INJECTION INTRAMUSCULAR at 12:50

## 2021-10-01 NOTE — BH INPATIENT PSYCHIATRY PROGRESS NOTE - PRN MEDS
MEDICATIONS  (PRN):  diphenhydrAMINE 50 milliGRAM(s) Oral every 6 hours PRN agitation/impulsivity  diphenhydrAMINE   Injectable 50 milliGRAM(s) IntraMuscular once PRN EPS  diphenhydrAMINE   Injectable 50 milliGRAM(s) IntraMuscular every 6 hours PRN Agitation  haloperidol    Injectable 5 milliGRAM(s) IntraMuscular once PRN agitation  haloperidol    Injectable 5 milliGRAM(s) IntraMuscular once PRN agitation  LORazepam     Tablet 2 milliGRAM(s) Oral every 6 hours PRN agitation/impulsivity  LORazepam   Injectable 2 milliGRAM(s) IntraMuscular once PRN agitation  LORazepam   Injectable 2 milliGRAM(s) IntraMuscular every 4 hours PRN Agitation  LORazepam   Injectable 2 milliGRAM(s) IntraMuscular once PRN agitation  nicotine  Polacrilex Gum 2 milliGRAM(s) Oral every 2 hours PRN breakthrough cravings  risperiDONE  Disintegrating Tablet 1 milliGRAM(s) Oral every 6 hours PRN acute agitation/psychosis

## 2021-10-01 NOTE — BH INPATIENT PSYCHIATRY PROGRESS NOTE - NSBHFUPINTERVALHXFT_PSY_A_CORE
Discussed in team, accepted oral medication last night though refusing since. Discharge-focused, poor insight and judgment.

## 2021-10-01 NOTE — BH INPATIENT PSYCHIATRY PROGRESS NOTE - CURRENT MEDICATION
MEDICATIONS  (STANDING):  benztropine 0.5 milliGRAM(s) Oral two times a day  cloNIDine 0.1 milliGRAM(s) Oral two times a day  melatonin. 5 milliGRAM(s) Oral at bedtime  nicotine - 21 mG/24Hr(s) Patch 1 Patch Transdermal daily  risperiDONE   Tablet 2 milliGRAM(s) Oral two times a day  traZODone 100 milliGRAM(s) Oral at bedtime    MEDICATIONS  (PRN):  diphenhydrAMINE 50 milliGRAM(s) Oral every 6 hours PRN agitation/impulsivity  diphenhydrAMINE   Injectable 50 milliGRAM(s) IntraMuscular once PRN EPS  diphenhydrAMINE   Injectable 50 milliGRAM(s) IntraMuscular every 6 hours PRN Agitation  haloperidol    Injectable 5 milliGRAM(s) IntraMuscular once PRN agitation  haloperidol    Injectable 5 milliGRAM(s) IntraMuscular once PRN agitation  LORazepam     Tablet 2 milliGRAM(s) Oral every 6 hours PRN agitation/impulsivity  LORazepam   Injectable 2 milliGRAM(s) IntraMuscular once PRN agitation  LORazepam   Injectable 2 milliGRAM(s) IntraMuscular every 4 hours PRN Agitation  LORazepam   Injectable 2 milliGRAM(s) IntraMuscular once PRN agitation  nicotine  Polacrilex Gum 2 milliGRAM(s) Oral every 2 hours PRN breakthrough cravings  risperiDONE  Disintegrating Tablet 1 milliGRAM(s) Oral every 6 hours PRN acute agitation/psychosis

## 2021-10-01 NOTE — BH INPATIENT PSYCHIATRY PROGRESS NOTE - NSBHCHARTREVIEWVS_PSY_A_CORE FT
Vital Signs Last 24 Hrs  T(C): 36.2 (10-02-21 @ 05:47), Max: 36.5 (10-01-21 @ 19:54)  T(F): 97.2 (10-02-21 @ 05:47), Max: 97.7 (10-01-21 @ 19:54)  HR: 73 (10-01-21 @ 17:11) (73 - 73)  BP: 110/58 (10-01-21 @ 17:11) (110/58 - 110/58)  BP(mean): --  RR: --  SpO2: --    Orthostatic VS  10-01-21 @ 19:54  Lying BP: --/-- HR: --  Sitting BP: 98/61 HR: 76  Standing BP: 110/66 HR: 79  Site: --  Mode: --  Orthostatic VS  09-30-21 @ 18:46  Lying BP: --/-- HR: --  Sitting BP: 111/71 HR: 83  Standing BP: 103/79 HR: 100  Site: --  Mode: --  Orthostatic VS  09-30-21 @ 16:00  Lying BP: --/-- HR: --  Sitting BP: 105/55 HR: 67  Standing BP: 107/65 HR: 79  Site: --  Mode: --  Orthostatic VS  09-30-21 @ 15:42  Lying BP: --/-- HR: --  Sitting BP: 129/85 HR: 76  Standing BP: --/-- HR: --  Site: --  Mode: --
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
## 2021-10-01 NOTE — BH INPATIENT PSYCHIATRY PROGRESS NOTE - NSBHASSESSSUMMFT_PSY_ALL_CORE
Patient is a 40 year old male who is unemployed, living in [facility] with a past psychiatric history of Schizoaffective disorder, following with [facility], with multiple inpatient hospitalizations secondary to non compliance and also with a history of crack cocaine / Cannabis use disorder with multiple rehab visits and an extensive legal history (currently on parole), who was BIBA for evaluation of agitated bizarre behavior. Patient has been seen and evaluated and currently presents with disorganized behavior, delusion of paranoia, AH and aggressive behavior. Patient is a danger to others and requires involuntary psychiatric hospitalizations (DOCS). Acute decompensation possibly precipitated by substance use, particularly in context of medication nonadherence. Will stabilize with oral antipsychotics, then plan to resume RANKIN. May benefit from mood stabilizer, psychotic symptoms having some manic qualities.      - Risperidone 2mg BID for psychosis (from Invega Sustenna 234mg, last dose 8/5), titrate  PRN Agitated psychosis:      - q6h PO risperidone 1mg ODT + lorazepam 2mg + diphenhydramine 50mg       - q6h IM haloperidol 5mg + lorazepam 2mg + diphenhydramine 50mg  - Clonidine 0.1mg BID for impulsivity/aggression (home med)  - trazodone 100mg qhs for insomnia (home med)  - Benztropine 0.5mg BID for eps (home med)  - melatonin 5mg qhs for insomnia (home med)    - Monitor for acute aggression potential given presentation and history  - Routine observation  - f/u A1c, lipids, prolactin, utox  - encourage engagement in milieu  - coordinate dispo with outpt team

## 2021-10-01 NOTE — BH INPATIENT PSYCHIATRY PROGRESS NOTE - NSBHMETABOLIC_PSY_ALL_CORE_FT
BMI: BMI (kg/m2): 25.1 (09-30-21 @ 15:42)  HbA1c:   Glucose:   BP: 110/58 (10-01-21 @ 17:11) (110/58 - 110/58)  Lipid Panel:

## 2021-10-01 NOTE — PSYCHIATRIC REHAB INITIAL EVALUATION - NSBHPRRECOMMEND_PSY_ALL_CORE
Writer attempted to meet with patient in order to orient patient to unit and briefly introduce patient to psychiatric rehabilitation staff and department function, however patient was recently given IM PRN after being verbally aggressive towards staff and not moving away from exit door when redirected; therefore the following information will be obtained from chart. Chart reports patient is currently admitted due to increased aggressive behavior at residence in the context of medication non-compliance. Pt has not received RANKIN since 08/2021. Pt has a history of non-compliance. As patient is unable to collaborate on goal, psychiatric rehabilitation staff will work with patient on maintaining behavioral control for seven consecutive days. Psychiatric rehabilitation staff will engage patient daily.

## 2021-10-02 PROCEDURE — 99232 SBSQ HOSP IP/OBS MODERATE 35: CPT

## 2021-10-02 RX ORDER — HALOPERIDOL DECANOATE 100 MG/ML
5 INJECTION INTRAMUSCULAR ONCE
Refills: 0 | Status: COMPLETED | OUTPATIENT
Start: 2021-10-02 | End: 2021-10-02

## 2021-10-02 RX ORDER — DIPHENHYDRAMINE HCL 50 MG
50 CAPSULE ORAL ONCE
Refills: 0 | Status: COMPLETED | OUTPATIENT
Start: 2021-10-02 | End: 2021-10-02

## 2021-10-02 RX ORDER — HALOPERIDOL DECANOATE 100 MG/ML
7.5 INJECTION INTRAMUSCULAR ONCE
Refills: 0 | Status: COMPLETED | OUTPATIENT
Start: 2021-10-02 | End: 2021-10-02

## 2021-10-02 RX ADMIN — Medication 3 MILLIGRAM(S): at 18:00

## 2021-10-02 RX ADMIN — Medication 50 MILLIGRAM(S): at 10:00

## 2021-10-02 RX ADMIN — HALOPERIDOL DECANOATE 7.5 MILLIGRAM(S): 100 INJECTION INTRAMUSCULAR at 18:00

## 2021-10-02 RX ADMIN — Medication 50 MILLIGRAM(S): at 18:00

## 2021-10-02 RX ADMIN — Medication 2 MILLIGRAM(S): at 10:00

## 2021-10-02 RX ADMIN — HALOPERIDOL DECANOATE 5 MILLIGRAM(S): 100 INJECTION INTRAMUSCULAR at 10:00
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
## 2021-10-02 NOTE — BH INPATIENT PSYCHIATRY PROGRESS NOTE - NSBHASSESSSUMMFT_PSY_ALL_CORE
Patient is a 40 year old male who is unemployed, [place] with a past psychiatric history of Schizoaffective disorder, following with [facility], with multiple inpatient hospitalizations secondary to non compliance and also with a history of crack cocaine / Cannabis use disorder with multiple rehab visits and an extensive legal history (currently on parole), who was BIBA for evaluation of agitated bizarre behavior. Patient has been seen and evaluated and currently presents with disorganized behavior, delusion of paranoia, AH and aggressive behavior. Patient is a danger to others and requires involuntary psychiatric hospitalizations (DOCS). Acute decompensation possibly precipitated by substance use, particularly in context of medication nonadherence. Will stabilize with oral antipsychotics, then plan to resume RANKIN. May benefit from mood stabilizer, psychotic symptoms having some manic qualities.      - Risperidone 2mg BID for psychosis (from Invega Sustenna 234mg, last dose 8/5), titrate  PRN Agitated psychosis:      - q6h PO risperidone 1mg ODT + lorazepam 2mg + diphenhydramine 50mg       - q6h IM haloperidol 5mg + lorazepam 2mg + diphenhydramine 50mg  - Clonidine 0.1mg BID for impulsivity/aggression (home med)  - trazodone 100mg qhs for insomnia (home med)  - Benztropine 0.5mg BID for eps (home med)  - melatonin 5mg qhs for insomnia (home med)    - Monitor for acute aggression potential given presentation and history  - Routine observation  - f/u A1c, lipids, prolactin, utox  - encourage engagement in milieu  - coordinate dispo with outpt team

## 2021-10-03 PROCEDURE — 99232 SBSQ HOSP IP/OBS MODERATE 35: CPT

## 2021-10-03 RX ORDER — NICOTINE POLACRILEX 2 MG
4 GUM BUCCAL
Refills: 0 | Status: DISCONTINUED | OUTPATIENT
Start: 2021-10-03 | End: 2021-10-19

## 2021-10-03 RX ADMIN — Medication 50 MILLIGRAM(S): at 19:34

## 2021-10-03 RX ADMIN — Medication 4 MILLIGRAM(S): at 19:35

## 2021-10-03 RX ADMIN — Medication 0.1 MILLIGRAM(S): at 19:32

## 2021-10-03 RX ADMIN — Medication 0.5 MILLIGRAM(S): at 19:33

## 2021-10-03 RX ADMIN — Medication 2 MILLIGRAM(S): at 17:28

## 2021-10-03 RX ADMIN — Medication 2 MILLIGRAM(S): at 11:52

## 2021-10-03 RX ADMIN — RISPERIDONE 2 MILLIGRAM(S): 4 TABLET ORAL at 19:32

## 2021-10-03 NOTE — BH INPATIENT PSYCHIATRY PROGRESS NOTE - NSBHFUPINTERVALHXFT_PSY_A_CORE
Patient is followed up for Schizoaffective Disorder, admitted for psychotic decompensation, and escalating aggression with bizarre behaviors.  Chart, medications and labs reviewed.  Patient remains high risk for aggression.  Patient is discussed with nursing staff. Per nursing patient remains tenuous on the unit.  Psych emergency called as pt banging on walls, unable to deescalate verbally. Pt received Haldol 5, Ativan 2, Benadryl 50 IM PRNs. Did not require restraints. Pt later again agitated, discharge focused, approaching exit door multiple times, not able to deescalate verbally, not amenable to PO PRNs; ROM ordered one time Haldol 7.5mg, Ativan 3mg IMs and activated Benadryl 50mg IM.    Today patient is observed pacing hallway, notably irritable upon approach, very hostile, paranoid.  Remains discharge focus, shouting at writer demanding discharge and his clothes. Patient has been refusing standing medications. Patient is disorganized with ongoing perceptual disturbances, and paranoid persecutory delusional themes of people trying to harm him.  Patient remains labile, unpredictable behaviors and easily activated. Likely will continue to require frequent emergency medications.  No acute medical concerns, VSS.  Will continue to provide therapeutic support.  Patient is on Behavioral plan and Elopement precautions (will remain in hospital gown).

## 2021-10-03 NOTE — BH INPATIENT PSYCHIATRY PROGRESS NOTE - NSBHMETABOLIC_PSY_ALL_CORE_FT
BMI: BMI (kg/m2): 28.3 (10-02-21 @ 15:39)  HbA1c:   Glucose:   BP: 97/57 (10-02-21 @ 18:30) (97/57 - 110/58)  Lipid Panel:

## 2021-10-03 NOTE — BH INPATIENT PSYCHIATRY PROGRESS NOTE - NSBHCHARTREVIEWVS_PSY_A_CORE FT
Vital Signs Last 24 Hrs  T(C): 36.9 (10-02-21 @ 18:30), Max: 36.9 (10-02-21 @ 18:30)  T(F): 98.5 (10-02-21 @ 18:30), Max: 98.5 (10-02-21 @ 18:30)  HR: 58 (10-02-21 @ 18:30) (58 - 58)  BP: 97/57 (10-02-21 @ 18:30) (97/57 - 97/57)  BP(mean): --  RR: --  SpO2: --    Orthostatic VS  10-01-21 @ 19:54  Lying BP: --/-- HR: --  Sitting BP: 98/61 HR: 76  Standing BP: 110/66 HR: 79  Site: --  Mode: --

## 2021-10-03 NOTE — BH INPATIENT PSYCHIATRY PROGRESS NOTE - NSBHASSESSSUMMFT_PSY_ALL_CORE
Patient is a 40 year old male who is unemployed, living in [facility] with a past psychiatric history of Schizoaffective disorder, following with [facility], with multiple inpatient hospitalizations secondary to non compliance and also with a history of crack cocaine / Cannabis use disorder with multiple rehab visits and an extensive legal history (currently on parole), who was BIBA for evaluation of agitated bizarre behavior. Patient has been seen and evaluated and currently presents with disorganized behavior, delusion of paranoia, AH and aggressive behavior. Patient is a danger to others and requires involuntary psychiatric hospitalizations (DOCS). Acute decompensation possibly precipitated by substance use, particularly in context of medication nonadherence. Will stabilize with oral antipsychotics, then plan to resume RANKIN. May benefit from mood stabilizer, psychotic symptoms having some manic qualities.      - Risperidone 2mg BID for psychosis (from Invega Sustenna 234mg, last dose 8/5), titrate  PRN Agitated psychosis:      - q6h PO risperidone 1mg ODT + lorazepam 2mg + diphenhydramine 50mg       - q6h IM haloperidol 5mg + lorazepam 2mg + diphenhydramine 50mg  - Clonidine 0.1mg BID for impulsivity/aggression (home med)  - trazodone 100mg qhs for insomnia (home med)  - Benztropine 0.5mg BID for eps (home med)  - melatonin 5mg qhs for insomnia (home med)    - Monitor for acute aggression potential given presentation and history  - Routine observation  - f/u A1c, lipids, prolactin, utox  - encourage engagement in milieu  - coordinate dispo with outpt team

## 2021-10-04 PROCEDURE — 99232 SBSQ HOSP IP/OBS MODERATE 35: CPT

## 2021-10-04 RX ORDER — DIPHENHYDRAMINE HCL 50 MG
50 CAPSULE ORAL ONCE
Refills: 0 | Status: DISCONTINUED | OUTPATIENT
Start: 2021-10-04 | End: 2021-10-07

## 2021-10-04 RX ORDER — DIPHENHYDRAMINE HCL 50 MG
50 CAPSULE ORAL ONCE
Refills: 0 | Status: COMPLETED | OUTPATIENT
Start: 2021-10-04 | End: 2021-10-04

## 2021-10-04 RX ORDER — DIPHENHYDRAMINE HCL 50 MG
50 CAPSULE ORAL EVERY 6 HOURS
Refills: 0 | Status: DISCONTINUED | OUTPATIENT
Start: 2021-10-04 | End: 2021-10-19

## 2021-10-04 RX ORDER — HALOPERIDOL DECANOATE 100 MG/ML
5 INJECTION INTRAMUSCULAR ONCE
Refills: 0 | Status: DISCONTINUED | OUTPATIENT
Start: 2021-10-04 | End: 2021-10-07

## 2021-10-04 RX ADMIN — Medication 100 MILLIGRAM(S): at 19:43

## 2021-10-04 RX ADMIN — Medication 0.5 MILLIGRAM(S): at 19:42

## 2021-10-04 RX ADMIN — Medication 50 MILLIGRAM(S): at 19:42

## 2021-10-04 RX ADMIN — RISPERIDONE 2 MILLIGRAM(S): 4 TABLET ORAL at 19:42

## 2021-10-04 RX ADMIN — Medication 4 MILLIGRAM(S): at 18:29

## 2021-10-04 RX ADMIN — Medication 4 MILLIGRAM(S): at 13:22

## 2021-10-04 NOTE — BH INPATIENT PSYCHIATRY PROGRESS NOTE - NSBHASSESSSUMMFT_PSY_ALL_CORE
Patient is a 40 year old male who is unemployed, living in [facility] with a past psychiatric history of Schizoaffective disorder, following with [facility] OPD, with multiple inpatient hospitalizations secondary to non compliance and also with a history of crack cocaine / Cannabis use disorder with multiple rehab visits and an extensive legal history (currently on parole), who was BIBA for evaluation of agitated bizarre behavior. Patient has been seen and evaluated and currently presents with disorganized behavior, delusion of paranoia, AH and aggressive behavior. Patient is a danger to others and requires involuntary psychiatric hospitalizations (DOCS). Acute decompensation possibly precipitated by substance use, particularly in context of medication nonadherence. Will stabilize with oral antipsychotics, then plan to resume RANKIN. May benefit from mood stabilizer, psychotic symptoms having some manic qualities.    Continued tenuous behavioral control, frequent emergency PRN medication for agitation.   - Risperidone 2mg BID for psychosis (from Invega Sustenna 234mg, last dose 8/5), titrate  PRN Agitated psychosis:      - q6h PO risperidone 1mg ODT + lorazepam 2mg + diphenhydramine 50mg      - q6h IM haloperidol 5mg + lorazepam 2mg + diphenhydramine 50mg  - Clonidine 0.1mg BID for impulsivity/aggression (home med)  - trazodone 100mg qhs for insomnia (home med)  - Benztropine 0.5mg BID for eps (home med)  - melatonin 5mg qhs for insomnia (home med)    - Monitor for acute aggression potential given presentation and history  - Routine observation  - Elopement precautions  - f/u A1c, lipids, prolactin, utox  - encourage engagement in milieu  - coordinate dispo with outpt team

## 2021-10-04 NOTE — BH INPATIENT PSYCHIATRY PROGRESS NOTE - CURRENT MEDICATION
MEDICATIONS  (STANDING):  benztropine 0.5 milliGRAM(s) Oral two times a day  cloNIDine 0.1 milliGRAM(s) Oral two times a day  diphenhydrAMINE   Injectable 50 milliGRAM(s) IntraMuscular once  melatonin. 5 milliGRAM(s) Oral at bedtime  nicotine - 21 mG/24Hr(s) Patch 1 Patch Transdermal daily  risperiDONE   Tablet 2 milliGRAM(s) Oral two times a day  traZODone 100 milliGRAM(s) Oral at bedtime    MEDICATIONS  (PRN):  diphenhydrAMINE 50 milliGRAM(s) Oral every 6 hours PRN agitation/impulsivity  diphenhydrAMINE   Injectable 50 milliGRAM(s) IntraMuscular once PRN EPS  diphenhydrAMINE   Injectable 50 milliGRAM(s) IntraMuscular every 6 hours PRN Agitation  haloperidol    Injectable 5 milliGRAM(s) IntraMuscular once PRN agitation  haloperidol    Injectable 5 milliGRAM(s) IntraMuscular once PRN agitation  haloperidol    Injectable 5 milliGRAM(s) IntraMuscular once PRN agitation  LORazepam     Tablet 2 milliGRAM(s) Oral every 6 hours PRN agitation/impulsivity  LORazepam   Injectable 2 milliGRAM(s) IntraMuscular once PRN agitation  LORazepam   Injectable 2 milliGRAM(s) IntraMuscular every 4 hours PRN Agitation  LORazepam   Injectable 2 milliGRAM(s) IntraMuscular once PRN agitation  LORazepam   Injectable 2 milliGRAM(s) IntraMuscular once PRN agitation  nicotine  Polacrilex Gum 4 milliGRAM(s) Oral every 3 hours PRN cravings  risperiDONE  Disintegrating Tablet 1 milliGRAM(s) Oral every 6 hours PRN acute agitation/psychosis
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
## 2021-10-04 NOTE — BH INPATIENT PSYCHIATRY PROGRESS NOTE - NSBHCHARTREVIEWVS_PSY_A_CORE FT
Vital Signs Last 24 Hrs  T(C): 36.2 (10-04-21 @ 07:00), Max: 36.7 (10-03-21 @ 14:48)  T(F): 97.1 (10-04-21 @ 07:00), Max: 98.1 (10-03-21 @ 14:48)  HR: 84 (10-03-21 @ 19:45) (84 - 84)  BP: 122/74 (10-03-21 @ 19:45) (122/74 - 122/74)  BP(mean): --  RR: 18 (10-03-21 @ 19:45) (18 - 18)  SpO2: 100% (10-03-21 @ 19:45) (100% - 100%)

## 2021-10-04 NOTE — BH INPATIENT PSYCHIATRY PROGRESS NOTE - PRN MEDS
MEDICATIONS  (PRN):  diphenhydrAMINE 50 milliGRAM(s) Oral every 6 hours PRN agitation/impulsivity  diphenhydrAMINE   Injectable 50 milliGRAM(s) IntraMuscular once PRN EPS  diphenhydrAMINE   Injectable 50 milliGRAM(s) IntraMuscular every 6 hours PRN Agitation  haloperidol    Injectable 5 milliGRAM(s) IntraMuscular once PRN agitation  haloperidol    Injectable 5 milliGRAM(s) IntraMuscular once PRN agitation  haloperidol    Injectable 5 milliGRAM(s) IntraMuscular once PRN agitation  LORazepam     Tablet 2 milliGRAM(s) Oral every 6 hours PRN agitation/impulsivity  LORazepam   Injectable 2 milliGRAM(s) IntraMuscular once PRN agitation  LORazepam   Injectable 2 milliGRAM(s) IntraMuscular every 4 hours PRN Agitation  LORazepam   Injectable 2 milliGRAM(s) IntraMuscular once PRN agitation  LORazepam   Injectable 2 milliGRAM(s) IntraMuscular once PRN agitation  nicotine  Polacrilex Gum 4 milliGRAM(s) Oral every 3 hours PRN cravings  risperiDONE  Disintegrating Tablet 1 milliGRAM(s) Oral every 6 hours PRN acute agitation/psychosis   MEDICATIONS  (PRN):  diphenhydrAMINE 50 milliGRAM(s) Oral every 6 hours PRN agitation/impulsivity  diphenhydrAMINE   Injectable 50 milliGRAM(s) IntraMuscular once PRN EPS  diphenhydrAMINE   Injectable 50 milliGRAM(s) IntraMuscular every 6 hours PRN Agitation  diphenhydrAMINE   Injectable 50 milliGRAM(s) IntraMuscular once PRN EPS  haloperidol    Injectable 5 milliGRAM(s) IntraMuscular once PRN agitation  haloperidol    Injectable 5 milliGRAM(s) IntraMuscular once PRN agitation  haloperidol    Injectable 5 milliGRAM(s) IntraMuscular once PRN agitation  haloperidol    Injectable 5 milliGRAM(s) IntraMuscular once PRN agitation  LORazepam     Tablet 2 milliGRAM(s) Oral every 6 hours PRN agitation/impulsivity  LORazepam   Injectable 2 milliGRAM(s) IntraMuscular once PRN agitation  LORazepam   Injectable 2 milliGRAM(s) IntraMuscular every 4 hours PRN Agitation  LORazepam   Injectable 2 milliGRAM(s) IntraMuscular once PRN agitation  LORazepam   Injectable 2 milliGRAM(s) IntraMuscular once PRN agitation  LORazepam   Injectable 2 milliGRAM(s) IntraMuscular once PRN agitation  nicotine  Polacrilex Gum 4 milliGRAM(s) Oral every 3 hours PRN cravings  risperiDONE  Disintegrating Tablet 1 milliGRAM(s) Oral every 6 hours PRN acute agitation/psychosis

## 2021-10-04 NOTE — BH INPATIENT PSYCHIATRY PROGRESS NOTE - NSBHMETABOLIC_PSY_ALL_CORE_FT
BMI: BMI (kg/m2): 28.3 (10-02-21 @ 15:39)  HbA1c:   Glucose:   BP: 122/74 (10-03-21 @ 19:45) (97/57 - 122/74)  Lipid Panel:
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
## 2021-10-04 NOTE — BH INPATIENT PSYCHIATRY PROGRESS NOTE - CURRENT MEDICATION
MEDICATIONS  (STANDING):  benztropine 0.5 milliGRAM(s) Oral two times a day  cloNIDine 0.1 milliGRAM(s) Oral two times a day  diphenhydrAMINE   Injectable 50 milliGRAM(s) IntraMuscular once  melatonin. 5 milliGRAM(s) Oral at bedtime  nicotine - 21 mG/24Hr(s) Patch 1 Patch Transdermal daily  risperiDONE   Tablet 2 milliGRAM(s) Oral two times a day  traZODone 100 milliGRAM(s) Oral at bedtime    MEDICATIONS  (PRN):  diphenhydrAMINE 50 milliGRAM(s) Oral every 6 hours PRN agitation/impulsivity  diphenhydrAMINE   Injectable 50 milliGRAM(s) IntraMuscular once PRN EPS  diphenhydrAMINE   Injectable 50 milliGRAM(s) IntraMuscular every 6 hours PRN Agitation  haloperidol    Injectable 5 milliGRAM(s) IntraMuscular once PRN agitation  haloperidol    Injectable 5 milliGRAM(s) IntraMuscular once PRN agitation  haloperidol    Injectable 5 milliGRAM(s) IntraMuscular once PRN agitation  LORazepam     Tablet 2 milliGRAM(s) Oral every 6 hours PRN agitation/impulsivity  LORazepam   Injectable 2 milliGRAM(s) IntraMuscular once PRN agitation  LORazepam   Injectable 2 milliGRAM(s) IntraMuscular every 4 hours PRN Agitation  LORazepam   Injectable 2 milliGRAM(s) IntraMuscular once PRN agitation  LORazepam   Injectable 2 milliGRAM(s) IntraMuscular once PRN agitation  nicotine  Polacrilex Gum 4 milliGRAM(s) Oral every 3 hours PRN cravings  risperiDONE  Disintegrating Tablet 1 milliGRAM(s) Oral every 6 hours PRN acute agitation/psychosis   MEDICATIONS  (STANDING):  benztropine 0.5 milliGRAM(s) Oral two times a day  cloNIDine 0.1 milliGRAM(s) Oral two times a day  melatonin. 5 milliGRAM(s) Oral at bedtime  nicotine - 21 mG/24Hr(s) Patch 1 Patch Transdermal daily  risperiDONE   Tablet 2 milliGRAM(s) Oral two times a day  traZODone 100 milliGRAM(s) Oral at bedtime    MEDICATIONS  (PRN):  diphenhydrAMINE 50 milliGRAM(s) Oral every 6 hours PRN agitation/impulsivity  diphenhydrAMINE   Injectable 50 milliGRAM(s) IntraMuscular once PRN EPS  diphenhydrAMINE   Injectable 50 milliGRAM(s) IntraMuscular every 6 hours PRN Agitation  diphenhydrAMINE   Injectable 50 milliGRAM(s) IntraMuscular once PRN EPS  haloperidol    Injectable 5 milliGRAM(s) IntraMuscular once PRN agitation  haloperidol    Injectable 5 milliGRAM(s) IntraMuscular once PRN agitation  haloperidol    Injectable 5 milliGRAM(s) IntraMuscular once PRN agitation  haloperidol    Injectable 5 milliGRAM(s) IntraMuscular once PRN agitation  LORazepam     Tablet 2 milliGRAM(s) Oral every 6 hours PRN agitation/impulsivity  LORazepam   Injectable 2 milliGRAM(s) IntraMuscular once PRN agitation  LORazepam   Injectable 2 milliGRAM(s) IntraMuscular every 4 hours PRN Agitation  LORazepam   Injectable 2 milliGRAM(s) IntraMuscular once PRN agitation  LORazepam   Injectable 2 milliGRAM(s) IntraMuscular once PRN agitation  LORazepam   Injectable 2 milliGRAM(s) IntraMuscular once PRN agitation  nicotine  Polacrilex Gum 4 milliGRAM(s) Oral every 3 hours PRN cravings  risperiDONE  Disintegrating Tablet 1 milliGRAM(s) Oral every 6 hours PRN acute agitation/psychosis

## 2021-10-04 NOTE — BH INPATIENT PSYCHIATRY PROGRESS NOTE - PRN MEDS
MEDICATIONS  (PRN):  diphenhydrAMINE 50 milliGRAM(s) Oral every 6 hours PRN agitation/impulsivity  diphenhydrAMINE   Injectable 50 milliGRAM(s) IntraMuscular once PRN EPS  diphenhydrAMINE   Injectable 50 milliGRAM(s) IntraMuscular every 6 hours PRN Agitation  haloperidol    Injectable 5 milliGRAM(s) IntraMuscular once PRN agitation  haloperidol    Injectable 5 milliGRAM(s) IntraMuscular once PRN agitation  haloperidol    Injectable 5 milliGRAM(s) IntraMuscular once PRN agitation  LORazepam     Tablet 2 milliGRAM(s) Oral every 6 hours PRN agitation/impulsivity  LORazepam   Injectable 2 milliGRAM(s) IntraMuscular once PRN agitation  LORazepam   Injectable 2 milliGRAM(s) IntraMuscular every 4 hours PRN Agitation  LORazepam   Injectable 2 milliGRAM(s) IntraMuscular once PRN agitation  LORazepam   Injectable 2 milliGRAM(s) IntraMuscular once PRN agitation  nicotine  Polacrilex Gum 4 milliGRAM(s) Oral every 3 hours PRN cravings  risperiDONE  Disintegrating Tablet 1 milliGRAM(s) Oral every 6 hours PRN acute agitation/psychosis

## 2021-10-04 NOTE — BH INPATIENT PSYCHIATRY PROGRESS NOTE - NSBHCHARTREVIEWVS_PSY_A_CORE FT
Vital Signs Last 24 Hrs  T(C): 36.2 (10-04-21 @ 07:00), Max: 36.7 (10-03-21 @ 14:48)  T(F): 97.1 (10-04-21 @ 07:00), Max: 98.1 (10-03-21 @ 14:48)  HR: 84 (10-03-21 @ 19:45) (84 - 84)  BP: 122/74 (10-03-21 @ 19:45) (122/74 - 122/74)  BP(mean): --  RR: 18 (10-03-21 @ 19:45) (18 - 18)  SpO2: 100% (10-03-21 @ 19:45) (100% - 100%)     Vital Signs Last 24 Hrs  T(C): 36.2 (10-04-21 @ 07:00), Max: 36.7 (10-03-21 @ 19:45)  T(F): 97.1 (10-04-21 @ 07:00), Max: 98 (10-03-21 @ 19:45)  HR: 84 (10-03-21 @ 19:45) (84 - 84)  BP: 122/74 (10-03-21 @ 19:45) (122/74 - 122/74)  BP(mean): --  RR: 18 (10-03-21 @ 19:45) (18 - 18)  SpO2: 100% (10-03-21 @ 19:45) (100% - 100%)

## 2021-10-05 PROCEDURE — 99232 SBSQ HOSP IP/OBS MODERATE 35: CPT

## 2021-10-05 RX ORDER — RISPERIDONE 4 MG/1
2 TABLET ORAL DAILY
Refills: 0 | Status: DISCONTINUED | OUTPATIENT
Start: 2021-10-05 | End: 2021-10-06

## 2021-10-05 RX ORDER — RISPERIDONE 4 MG/1
3 TABLET ORAL AT BEDTIME
Refills: 0 | Status: DISCONTINUED | OUTPATIENT
Start: 2021-10-05 | End: 2021-10-06

## 2021-10-05 RX ADMIN — Medication 50 MILLIGRAM(S): at 20:02

## 2021-10-05 RX ADMIN — Medication 100 MILLIGRAM(S): at 20:03

## 2021-10-05 RX ADMIN — Medication 2 MILLIGRAM(S): at 08:35

## 2021-10-05 RX ADMIN — Medication 1 PATCH: at 08:35

## 2021-10-05 RX ADMIN — RISPERIDONE 2 MILLIGRAM(S): 4 TABLET ORAL at 08:35

## 2021-10-05 RX ADMIN — Medication 0.5 MILLIGRAM(S): at 20:02

## 2021-10-05 RX ADMIN — RISPERIDONE 3 MILLIGRAM(S): 4 TABLET ORAL at 20:02

## 2021-10-05 RX ADMIN — Medication 0.5 MILLIGRAM(S): at 08:35
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
## 2021-10-05 NOTE — BH INPATIENT PSYCHIATRY PROGRESS NOTE - NSBHCHARTREVIEWVS_PSY_A_CORE FT
Vital Signs Last 24 Hrs  T(C): 36.2 (10-05-21 @ 05:19), Max: 36.8 (10-04-21 @ 21:52)  T(F): 97.2 (10-05-21 @ 05:19), Max: 98.3 (10-04-21 @ 21:52)  HR: --  BP: --  BP(mean): --  RR: 18 (10-04-21 @ 21:12) (18 - 18)  SpO2: 99% (10-04-21 @ 21:12) (99% - 99%)    Orthostatic VS  10-04-21 @ 19:13  Lying BP: --/-- HR: --  Sitting BP: 105/67 HR: 62  Standing BP: 114/66 HR: 69  Site: --  Mode: --   Vital Signs Last 24 Hrs  T(C): 35.9 (10-05-21 @ 14:26), Max: 36.8 (10-04-21 @ 21:52)  T(F): 96.7 (10-05-21 @ 14:26), Max: 98.3 (10-04-21 @ 21:52)  HR: --  BP: --  BP(mean): --  RR: 18 (10-04-21 @ 21:12) (18 - 18)  SpO2: 99% (10-04-21 @ 21:12) (99% - 99%)    Orthostatic VS  10-04-21 @ 19:13  Lying BP: --/-- HR: --  Sitting BP: 105/67 HR: 62  Standing BP: 114/66 HR: 69  Site: --  Mode: --

## 2021-10-05 NOTE — BH INPATIENT PSYCHIATRY PROGRESS NOTE - CURRENT MEDICATION
MEDICATIONS  (STANDING):  benztropine 0.5 milliGRAM(s) Oral two times a day  cloNIDine 0.1 milliGRAM(s) Oral two times a day  melatonin. 5 milliGRAM(s) Oral at bedtime  nicotine - 21 mG/24Hr(s) Patch 1 Patch Transdermal daily  risperiDONE   Tablet 2 milliGRAM(s) Oral two times a day  traZODone 100 milliGRAM(s) Oral at bedtime    MEDICATIONS  (PRN):  diphenhydrAMINE 50 milliGRAM(s) Oral every 6 hours PRN agitation/impulsivity  diphenhydrAMINE   Injectable 50 milliGRAM(s) IntraMuscular once PRN EPS  diphenhydrAMINE   Injectable 50 milliGRAM(s) IntraMuscular every 6 hours PRN Agitation  diphenhydrAMINE   Injectable 50 milliGRAM(s) IntraMuscular once PRN EPS  haloperidol    Injectable 5 milliGRAM(s) IntraMuscular once PRN agitation  haloperidol    Injectable 5 milliGRAM(s) IntraMuscular once PRN agitation  haloperidol    Injectable 5 milliGRAM(s) IntraMuscular once PRN agitation  haloperidol    Injectable 5 milliGRAM(s) IntraMuscular once PRN agitation  LORazepam     Tablet 2 milliGRAM(s) Oral every 6 hours PRN agitation/impulsivity  LORazepam   Injectable 2 milliGRAM(s) IntraMuscular once PRN agitation  LORazepam   Injectable 2 milliGRAM(s) IntraMuscular every 4 hours PRN Agitation  LORazepam   Injectable 2 milliGRAM(s) IntraMuscular once PRN agitation  LORazepam   Injectable 2 milliGRAM(s) IntraMuscular once PRN agitation  LORazepam   Injectable 2 milliGRAM(s) IntraMuscular once PRN agitation  nicotine  Polacrilex Gum 4 milliGRAM(s) Oral every 3 hours PRN cravings  risperiDONE  Disintegrating Tablet 1 milliGRAM(s) Oral every 6 hours PRN acute agitation/psychosis

## 2021-10-05 NOTE — BH INPATIENT PSYCHIATRY PROGRESS NOTE - PRN MEDS
MEDICATIONS  (PRN):  diphenhydrAMINE 50 milliGRAM(s) Oral every 6 hours PRN agitation/impulsivity  diphenhydrAMINE   Injectable 50 milliGRAM(s) IntraMuscular once PRN EPS  diphenhydrAMINE   Injectable 50 milliGRAM(s) IntraMuscular every 6 hours PRN Agitation  diphenhydrAMINE   Injectable 50 milliGRAM(s) IntraMuscular once PRN EPS  haloperidol    Injectable 5 milliGRAM(s) IntraMuscular once PRN agitation  haloperidol    Injectable 5 milliGRAM(s) IntraMuscular once PRN agitation  haloperidol    Injectable 5 milliGRAM(s) IntraMuscular once PRN agitation  haloperidol    Injectable 5 milliGRAM(s) IntraMuscular once PRN agitation  LORazepam     Tablet 2 milliGRAM(s) Oral every 6 hours PRN agitation/impulsivity  LORazepam   Injectable 2 milliGRAM(s) IntraMuscular once PRN agitation  LORazepam   Injectable 2 milliGRAM(s) IntraMuscular every 4 hours PRN Agitation  LORazepam   Injectable 2 milliGRAM(s) IntraMuscular once PRN agitation  LORazepam   Injectable 2 milliGRAM(s) IntraMuscular once PRN agitation  LORazepam   Injectable 2 milliGRAM(s) IntraMuscular once PRN agitation  nicotine  Polacrilex Gum 4 milliGRAM(s) Oral every 3 hours PRN cravings  risperiDONE  Disintegrating Tablet 1 milliGRAM(s) Oral every 6 hours PRN acute agitation/psychosis

## 2021-10-05 NOTE — BH INPATIENT PSYCHIATRY PROGRESS NOTE - NSBHASSESSSUMMFT_PSY_ALL_CORE
Patient is a 40 year old male who is unemployed, living in [facility] with a past psychiatric history of Schizoaffective disorder, following with [facility] OPD, with multiple inpatient hospitalizations secondary to non compliance and also with a history of crack cocaine / Cannabis use disorder with multiple rehab visits and an extensive legal history (currently on parole), who was BIBA for evaluation of agitated bizarre behavior. Patient has been seen and evaluated and currently presents with disorganized behavior, delusion of paranoia, AH and aggressive behavior. Patient is a danger to others and requires involuntary psychiatric hospitalizations (DOCS). Acute decompensation possibly precipitated by substance use, particularly in context of medication nonadherence. Will stabilize with oral antipsychotics, then plan to resume RANKNI. May benefit from mood stabilizer, psychotic symptoms having some manic qualities.    Continued tenuous behavioral control, frequent emergency PRN medication for agitation.   - Risperidone 2mg BID for psychosis (from Invega Sustenna 234mg, last dose 8/5), titrate  PRN Agitated psychosis:      - q6h PO risperidone 1mg ODT + lorazepam 2mg + diphenhydramine 50mg      - q6h IM haloperidol 5mg + lorazepam 2mg + diphenhydramine 50mg  - Clonidine 0.1mg BID for impulsivity/aggression (home med)  - trazodone 100mg qhs for insomnia (home med)  - Benztropine 0.5mg BID for eps (home med)  - melatonin 5mg qhs for insomnia (home med)    - Monitor for acute aggression potential given presentation and history  - Routine observation  - Elopement precautions  - f/u A1c, lipids, prolactin, utox  - encourage engagement in milieu  - coordinate dispo with outpt team   Patient is a 40 year old male who is unemployed, living in [facility] with a past psychiatric history of Schizoaffective disorder, following with [facility] OPD, with multiple inpatient hospitalizations secondary to non compliance and also with a history of crack cocaine / Cannabis use disorder with multiple rehab visits and an extensive legal history (currently on parole), who was BIBA for evaluation of agitated bizarre behavior. Patient has been seen and evaluated and currently presents with disorganized behavior, delusion of paranoia, AH and aggressive behavior. Patient is a danger to others and requires involuntary psychiatric hospitalizations (DOCS). Acute decompensation possibly precipitated by substance use, particularly in context of medication nonadherence. Will stabilize with oral antipsychotics, then plan to resume RANKIN. May benefit from mood stabilizer, psychotic symptoms having some manic qualities. Continuing to titrate risperidone.    Continued tenuous behavioral control, frequent emergency PRN medication for agitation.   - Risperidone 2mg qAM + 3mg qhs for psychosis (from Invega Sustenna 234mg, last dose 8/5), titrating  - Clonidine 0.1mg BID for impulsivity/aggression (home med)  - trazodone 100mg qhs for insomnia (home med)  - Benztropine 0.5mg BID for eps (home med)  - melatonin 5mg qhs for insomnia (home med)  PRN Agitated psychosis:      - q6h PO risperidone 1mg ODT + lorazepam 2mg + diphenhydramine 50mg      - q6h IM haloperidol 5mg + lorazepam 2mg + diphenhydramine 50mg  - Monitor for acute aggression potential given presentation and history  - Routine observation  - Elopement precautions  - f/u A1c, lipids, prolactin, utox  - encourage engagement in milieu  - coordinate dispo with outpt team

## 2021-10-06 LAB — SARS-COV-2 RNA SPEC QL NAA+PROBE: SIGNIFICANT CHANGE UP

## 2021-10-06 PROCEDURE — 99232 SBSQ HOSP IP/OBS MODERATE 35: CPT

## 2021-10-06 RX ORDER — RISPERIDONE 4 MG/1
3 TABLET ORAL
Refills: 0 | Status: DISCONTINUED | OUTPATIENT
Start: 2021-10-06 | End: 2021-10-13

## 2021-10-06 RX ADMIN — Medication 100 MILLIGRAM(S): at 20:04

## 2021-10-06 RX ADMIN — Medication 0.5 MILLIGRAM(S): at 20:04

## 2021-10-06 RX ADMIN — RISPERIDONE 2 MILLIGRAM(S): 4 TABLET ORAL at 09:54

## 2021-10-06 RX ADMIN — RISPERIDONE 3 MILLIGRAM(S): 4 TABLET ORAL at 20:04

## 2021-10-06 RX ADMIN — Medication 0.5 MILLIGRAM(S): at 09:55

## 2021-10-06 RX ADMIN — Medication 1 PATCH: at 09:54

## 2021-10-06 NOTE — BH INPATIENT PSYCHIATRY PROGRESS NOTE - NSBHCHARTREVIEWVS_PSY_A_CORE FT
Vital Signs Last 24 Hrs  T(C): 36.7 (10-05-21 @ 22:54), Max: 36.7 (10-05-21 @ 22:54)  T(F): 98 (10-05-21 @ 22:54), Max: 98 (10-05-21 @ 22:54)  HR: --  BP: --  BP(mean): --  RR: --  SpO2: --    Orthostatic VS  10-04-21 @ 19:13  Lying BP: --/-- HR: --  Sitting BP: 105/67 HR: 62  Standing BP: 114/66 HR: 69  Site: --  Mode: --   Vital Signs Last 24 Hrs  T(C): 36.5 (10-07-21 @ 06:16), Max: 36.5 (10-07-21 @ 06:16)  T(F): 97.7 (10-07-21 @ 06:16), Max: 97.7 (10-07-21 @ 06:16)  HR: --  BP: --  BP(mean): --  RR: 12 (10-06-21 @ 17:01) (12 - 12)  SpO2: --    Orthostatic VS  10-07-21 @ 07:30  Lying BP: --/-- HR: --  Sitting BP: 109/62 HR: 60  Standing BP: 97/60 HR: 84  Site: --  Mode: electronic

## 2021-10-06 NOTE — BH INPATIENT PSYCHIATRY PROGRESS NOTE - NSBHMETABOLIC_PSY_ALL_CORE_FT
BMI: BMI (kg/m2): 28.3 (10-02-21 @ 15:39)  HbA1c:   Glucose:   BP: 122/74 (10-03-21 @ 19:45) (122/74 - 122/74)  Lipid Panel:  BMI: BMI (kg/m2): 28.3 (10-02-21 @ 15:39)  HbA1c:   Glucose:   BP: --  Lipid Panel:

## 2021-10-06 NOTE — BH INPATIENT PSYCHIATRY PROGRESS NOTE - NSBHASSESSSUMMFT_PSY_ALL_CORE
Patient is a 40 year old male who is unemployed, living in [facility] with a past psychiatric history of Schizoaffective disorder, following with [facility] OPD, with multiple inpatient hospitalizations secondary to non compliance and also with a history of crack cocaine / Cannabis use disorder with multiple rehab visits and an extensive legal history (currently on parole), who was BIBA for evaluation of agitated bizarre behavior. Patient has been seen and evaluated and currently presents with disorganized behavior, delusion of paranoia, AH and aggressive behavior. Patient is a danger to others and requires involuntary psychiatric hospitalizations (DOCS). Acute decompensation possibly precipitated by substance use, particularly in context of medication nonadherence. Will stabilize with oral antipsychotics, then plan to resume RANKIN. May benefit from mood stabilizer, psychotic symptoms having some manic qualities. Continuing to titrate risperidone.    Continued tenuous behavioral control, frequent emergency PRN medication for agitation.   - Risperidone 2mg qAM + 3mg qhs for psychosis (from Invega Sustenna 234mg, last dose 8/5), titrating  - Clonidine 0.1mg BID for impulsivity/aggression (home med)  - trazodone 100mg qhs for insomnia (home med)  - Benztropine 0.5mg BID for eps (home med)  - melatonin 5mg qhs for insomnia (home med)  PRN Agitated psychosis:      - q6h PO risperidone 1mg ODT + lorazepam 2mg + diphenhydramine 50mg      - q6h IM haloperidol 5mg + lorazepam 2mg + diphenhydramine 50mg  - Monitor for acute aggression potential given presentation and history  - Routine observation  - Elopement precautions  - f/u A1c, lipids, prolactin, utox  - encourage engagement in milieu  - coordinate dispo with outpt team   Patient is a 40 year old male who is unemployed, living in [facility] residence with a past psychiatric history of Schizoaffective disorder, following with [facility] OPD, with multiple inpatient hospitalizations secondary to non compliance and also with a history of crack cocaine / Cannabis use disorder with multiple rehab visits and an extensive legal history (currently on parole), who was BIBA for evaluation of agitated bizarre behavior. Patient has been seen and evaluated and currently presents with disorganized behavior, delusion of paranoia, AH and aggressive behavior. Patient is a danger to others and requires involuntary psychiatric hospitalizations (DOCS). Acute decompensation possibly precipitated by substance use, particularly in context of medication nonadherence.     Improving though continues to demonstrate grandiose delusions, illogical thought process, tenuous though less irritable.     Stabilizing with oral antipsychotics, then plan to transition to RANKIN. May benefit from mood stabilizer, psychotic symptoms having some manic qualities. Continuing to titrate risperidone.    Continued tenuous behavioral control, frequent emergency PRN medication for agitation.   - Risperidone 3mg BID for psychosis, preparing to transition to Risperdal Consta  - Clonidine 0.1mg BID for impulsivity/aggression (home med) presently refusing, may consider switching to guanfacine  - discussing Doxazosin for PTSD  - trazodone 100mg qhs for insomnia (home med)  - Benztropine 0.5mg BID for eps (home med)  - d/c melatonin as pt refuses  PRN Agitated psychosis:      - q6h PO risperidone 1mg ODT + lorazepam 2mg + diphenhydramine 50mg      - q6h IM haloperidol 5mg + lorazepam 2mg + diphenhydramine 50mg  - Monitor for acute aggression potential given presentation and history  - Routine observation  - Elopement precautions  - f/u A1c, lipids, prolactin, tsh (initially refused)  - encourage engagement in milieu  - coordinate dispo with outpt team

## 2021-10-06 NOTE — BH INPATIENT PSYCHIATRY PROGRESS NOTE - NSBHFUPINTERVALHXFT_PSY_A_CORE
Patient is followed up for Schizoaffective Disorder. Patient discussed with nursing staff. Chart, medications and labs reviewed.  Patient remains high risk for aggression, tenuous behavioral control on the unit.     ***** Patient is followed up for Schizoaffective Disorder. Patient discussed with nursing staff. Chart, medications and labs reviewed.  Patient remains high risk for aggression, improved though still tenuous behavioral control on the unit.     He reports good sleep, appetite and mood. He says that because today is his birthday he had hoped to be discharged, believes he should already have had his clothing returned. Writer agreed that patient would be allowed to go outside during fresh air breaks though since he had recently attempted to elope he would not yet be given his clothing to decrease risk of elopement.     He continues to demonstrate grandiose delusions believing himself to have inherited trillions of dollars, failed reality testing.     Unable to engage in meaningful conversation re reported trauma history, continually reporting loss of trillions of dollars as his trauma, unable to elaborate on history of foster care and possible repeated trauma through criminal behavior and incarceration. Asking staff for beer and a cigarette for his birthday, redirectable. Unclear whether he has experienced flashbacks or describes psychosis. Discussed possible medication trial though not yet able to engage in conversation.     Resistant to taking Invega Sustenna again, saying "Invega is death in [place]" and "like AIDS", saying that risperidone has worked much better, asks about continuing oral risperidone on discharge though understands and accepts that this would be more likely to fail and less likely to be reassuring to outpatient treaters and parole system, agrees to Risperdal Consta. Reports trazodone has been very helpful for sleep, wants to continue taking it on discharge. Pt continues to refuse clonidine, discussed possible benefit in reducing impulsivity and improving focus though pt is not presently interested.    Reports previously taking depakote when incarcerated years ago, does not remember specifics but that he did not like how it made him feel. Denies previous trial of lithium. Housing reports he has previously repeatedly asked to be placed on suboxone despite not having any recent use of opioids, believing him to have been seeking it for abuse.

## 2021-10-06 NOTE — BH INPATIENT PSYCHIATRY PROGRESS NOTE - CURRENT MEDICATION
MEDICATIONS  (STANDING):  benztropine 0.5 milliGRAM(s) Oral two times a day  cloNIDine 0.1 milliGRAM(s) Oral two times a day  melatonin. 5 milliGRAM(s) Oral at bedtime  nicotine - 21 mG/24Hr(s) Patch 1 Patch Transdermal daily  risperiDONE   Tablet 2 milliGRAM(s) Oral daily  risperiDONE   Tablet 3 milliGRAM(s) Oral at bedtime  traZODone 100 milliGRAM(s) Oral at bedtime    MEDICATIONS  (PRN):  diphenhydrAMINE 50 milliGRAM(s) Oral every 6 hours PRN agitation/impulsivity  diphenhydrAMINE   Injectable 50 milliGRAM(s) IntraMuscular once PRN EPS  diphenhydrAMINE   Injectable 50 milliGRAM(s) IntraMuscular every 6 hours PRN Agitation  diphenhydrAMINE   Injectable 50 milliGRAM(s) IntraMuscular once PRN EPS  haloperidol    Injectable 5 milliGRAM(s) IntraMuscular once PRN agitation  haloperidol    Injectable 5 milliGRAM(s) IntraMuscular once PRN agitation  haloperidol    Injectable 5 milliGRAM(s) IntraMuscular once PRN agitation  haloperidol    Injectable 5 milliGRAM(s) IntraMuscular once PRN agitation  LORazepam     Tablet 2 milliGRAM(s) Oral every 6 hours PRN agitation/impulsivity  LORazepam   Injectable 2 milliGRAM(s) IntraMuscular once PRN agitation  LORazepam   Injectable 2 milliGRAM(s) IntraMuscular every 4 hours PRN Agitation  LORazepam   Injectable 2 milliGRAM(s) IntraMuscular once PRN agitation  LORazepam   Injectable 2 milliGRAM(s) IntraMuscular once PRN agitation  LORazepam   Injectable 2 milliGRAM(s) IntraMuscular once PRN agitation  nicotine  Polacrilex Gum 4 milliGRAM(s) Oral every 3 hours PRN cravings  risperiDONE  Disintegrating Tablet 1 milliGRAM(s) Oral every 6 hours PRN acute agitation/psychosis   MEDICATIONS  (STANDING):  benztropine 0.5 milliGRAM(s) Oral two times a day  cloNIDine 0.1 milliGRAM(s) Oral two times a day  melatonin. 5 milliGRAM(s) Oral at bedtime  nicotine - 21 mG/24Hr(s) Patch 1 Patch Transdermal daily  risperiDONE   Tablet 3 milliGRAM(s) Oral two times a day  traZODone 100 milliGRAM(s) Oral at bedtime    MEDICATIONS  (PRN):  diphenhydrAMINE 50 milliGRAM(s) Oral every 6 hours PRN agitation/impulsivity  diphenhydrAMINE   Injectable 50 milliGRAM(s) IntraMuscular once PRN EPS  diphenhydrAMINE   Injectable 50 milliGRAM(s) IntraMuscular every 6 hours PRN Agitation  diphenhydrAMINE   Injectable 50 milliGRAM(s) IntraMuscular once PRN EPS  haloperidol    Injectable 5 milliGRAM(s) IntraMuscular once PRN agitation  haloperidol    Injectable 5 milliGRAM(s) IntraMuscular once PRN agitation  haloperidol    Injectable 5 milliGRAM(s) IntraMuscular once PRN agitation  haloperidol    Injectable 5 milliGRAM(s) IntraMuscular once PRN agitation  LORazepam     Tablet 2 milliGRAM(s) Oral every 6 hours PRN agitation/impulsivity  LORazepam   Injectable 2 milliGRAM(s) IntraMuscular once PRN agitation  LORazepam   Injectable 2 milliGRAM(s) IntraMuscular every 4 hours PRN Agitation  LORazepam   Injectable 2 milliGRAM(s) IntraMuscular once PRN agitation  LORazepam   Injectable 2 milliGRAM(s) IntraMuscular once PRN agitation  LORazepam   Injectable 2 milliGRAM(s) IntraMuscular once PRN agitation  nicotine  Polacrilex Gum 4 milliGRAM(s) Oral every 3 hours PRN cravings  risperiDONE  Disintegrating Tablet 1 milliGRAM(s) Oral every 6 hours PRN acute agitation/psychosis

## 2021-10-07 PROCEDURE — 99232 SBSQ HOSP IP/OBS MODERATE 35: CPT

## 2021-10-07 RX ORDER — HALOPERIDOL DECANOATE 100 MG/ML
5 INJECTION INTRAMUSCULAR ONCE
Refills: 0 | Status: DISCONTINUED | OUTPATIENT
Start: 2021-10-07 | End: 2021-10-19

## 2021-10-07 RX ADMIN — Medication 0.5 MILLIGRAM(S): at 20:04

## 2021-10-07 RX ADMIN — Medication 1 PATCH: at 08:19

## 2021-10-07 RX ADMIN — RISPERIDONE 3 MILLIGRAM(S): 4 TABLET ORAL at 08:20

## 2021-10-07 RX ADMIN — RISPERIDONE 3 MILLIGRAM(S): 4 TABLET ORAL at 20:04

## 2021-10-07 RX ADMIN — Medication 1 PATCH: at 08:00

## 2021-10-07 RX ADMIN — Medication 0.5 MILLIGRAM(S): at 08:20

## 2021-10-07 RX ADMIN — Medication 100 MILLIGRAM(S): at 20:05

## 2021-10-07 NOTE — BH INPATIENT PSYCHIATRY PROGRESS NOTE - NSBHFUPINTERVALHXFT_PSY_A_CORE
Patient is followed up for Schizoaffective Disorder. Patient discussed with nursing staff. Chart, medications and labs reviewed.  Patient remains high risk for aggression, improved though still tenuous behavioral control on the unit. Patient has been allowed into outdoor area of unit beginning yesterday afternoon.    Patient reports discontinuous sleep, which he blames on staff members opening and closing his door during periodic checks; reports waking feeling refreshed. Patient reports reduced appetite, but remarked that he enjoyed breakfast today.    Patient claims to have PTSD and expressed dissatisfaction with and anger towards doctors that will not treat this but instead focus on psychosis; patient says he has never had flashbacks typically seen in PTSD as described by interviewer.    Patient repeatedly perseverates over being held in the unit, repeatedly describing himself as a "hostage" and "trapped" throughout interview; also perseverates over not being able to wear his own clothing.    Patient demonstrates disorganized thoughts and delusions, claiming to have played basketball with interviewer ten years ago.

## 2021-10-07 NOTE — BH INPATIENT PSYCHIATRY PROGRESS NOTE - NSBHASSESSSUMMFT_PSY_ALL_CORE
Patient is a 40 year old male who is unemployed, living in [facility] residence with a past psychiatric history of Schizoaffective disorder, following with [facility] OPD, with multiple inpatient hospitalizations secondary to non compliance and also with a history of crack cocaine / Cannabis use disorder with multiple rehab visits and an extensive legal history (currently on parole), who was BIBA for evaluation of agitated bizarre behavior. Patient has been seen and evaluated and currently presents with disorganized behavior, delusion of paranoia, AH and aggressive behavior. Patient is a danger to others and requires involuntary psychiatric hospitalizations (DOCS). Acute decompensation possibly precipitated by substance use, particularly in context of medication nonadherence.     Improving though continues to demonstrate grandiose delusions, illogical thought process, tenuous though less irritable.     Stabilizing with oral antipsychotics, then plan to transition to RANKIN. May benefit from mood stabilizer, psychotic symptoms having some manic qualities. Continuing to titrate risperidone.    Continued tenuous behavioral control, frequent emergency PRN medication for agitation.   - Risperidone 3mg BID for psychosis, preparing to transition to Risperdal Consta  - Clonidine 0.1mg BID for impulsivity/aggression (home med) presently refusing, may consider switching to guanfacine  - discussing Doxazosin for PTSD  - trazodone 100mg qhs for insomnia (home med)  - Benztropine 0.5mg BID for eps (home med)  - d/c melatonin as pt refuses  PRN Agitated psychosis:      - q6h PO risperidone 1mg ODT + lorazepam 2mg + diphenhydramine 50mg      - q6h IM haloperidol 5mg + lorazepam 2mg + diphenhydramine 50mg  - Monitor for acute aggression potential given presentation and history  - Routine observation  - Elopement precautions  - f/u A1c, lipids, prolactin, tsh (initially refused)  - encourage engagement in milieu  - coordinate dispo with outpt team

## 2021-10-07 NOTE — BH INPATIENT PSYCHIATRY PROGRESS NOTE - CURRENT MEDICATION
MEDICATIONS  (STANDING):  benztropine 0.5 milliGRAM(s) Oral two times a day  cloNIDine 0.1 milliGRAM(s) Oral two times a day  nicotine - 21 mG/24Hr(s) Patch 1 Patch Transdermal daily  risperiDONE   Tablet 3 milliGRAM(s) Oral two times a day  traZODone 100 milliGRAM(s) Oral at bedtime    MEDICATIONS  (PRN):  diphenhydrAMINE 50 milliGRAM(s) Oral every 6 hours PRN agitation/impulsivity  diphenhydrAMINE   Injectable 50 milliGRAM(s) IntraMuscular once PRN EPS  diphenhydrAMINE   Injectable 50 milliGRAM(s) IntraMuscular every 6 hours PRN Agitation  diphenhydrAMINE   Injectable 50 milliGRAM(s) IntraMuscular once PRN EPS  haloperidol    Injectable 5 milliGRAM(s) IntraMuscular once PRN agitation  haloperidol    Injectable 5 milliGRAM(s) IntraMuscular once PRN agitation  haloperidol    Injectable 5 milliGRAM(s) IntraMuscular once PRN agitation  haloperidol    Injectable 5 milliGRAM(s) IntraMuscular once PRN agitation  LORazepam     Tablet 2 milliGRAM(s) Oral every 6 hours PRN agitation/impulsivity  LORazepam   Injectable 2 milliGRAM(s) IntraMuscular once PRN agitation  LORazepam   Injectable 2 milliGRAM(s) IntraMuscular every 4 hours PRN Agitation  LORazepam   Injectable 2 milliGRAM(s) IntraMuscular once PRN agitation  LORazepam   Injectable 2 milliGRAM(s) IntraMuscular once PRN agitation  LORazepam   Injectable 2 milliGRAM(s) IntraMuscular once PRN agitation  nicotine  Polacrilex Gum 4 milliGRAM(s) Oral every 3 hours PRN cravings  risperiDONE  Disintegrating Tablet 1 milliGRAM(s) Oral every 6 hours PRN acute agitation/psychosis

## 2021-10-07 NOTE — BH INPATIENT PSYCHIATRY PROGRESS NOTE - NSBHCHARTREVIEWVS_PSY_A_CORE FT
Vital Signs Last 24 Hrs  T(C): 36.5 (10-07-21 @ 06:16), Max: 36.5 (10-07-21 @ 06:16)  T(F): 97.7 (10-07-21 @ 06:16), Max: 97.7 (10-07-21 @ 06:16)  HR: --  BP: --  BP(mean): --  RR: 12 (10-06-21 @ 17:01) (12 - 12)  SpO2: --    Orthostatic VS  10-07-21 @ 07:30  Lying BP: --/-- HR: --  Sitting BP: 109/62 HR: 60  Standing BP: 97/60 HR: 84  Site: --  Mode: electronic

## 2021-10-08 LAB
A1C WITH ESTIMATED AVERAGE GLUCOSE RESULT: 5.1 % — SIGNIFICANT CHANGE UP (ref 4–5.6)
CHOLEST SERPL-MCNC: 171 MG/DL — SIGNIFICANT CHANGE UP
ESTIMATED AVERAGE GLUCOSE: 100 — SIGNIFICANT CHANGE UP
HDLC SERPL-MCNC: 50 MG/DL — SIGNIFICANT CHANGE UP
LIPID PNL WITH DIRECT LDL SERPL: 99 MG/DL — SIGNIFICANT CHANGE UP
NON HDL CHOLESTEROL: 121 MG/DL — SIGNIFICANT CHANGE UP
PROLACTIN SERPL-MCNC: 30.4 NG/ML — HIGH (ref 4.1–18.4)
TRIGL SERPL-MCNC: 112 MG/DL — SIGNIFICANT CHANGE UP
TSH SERPL-MCNC: 1.54 UIU/ML — SIGNIFICANT CHANGE UP (ref 0.27–4.2)

## 2021-10-08 PROCEDURE — 99232 SBSQ HOSP IP/OBS MODERATE 35: CPT

## 2021-10-08 RX ORDER — CLONAZEPAM 1 MG
0.5 TABLET ORAL
Refills: 0 | Status: DISCONTINUED | OUTPATIENT
Start: 2021-10-08 | End: 2021-10-12

## 2021-10-08 RX ADMIN — Medication 100 MILLIGRAM(S): at 19:59

## 2021-10-08 RX ADMIN — Medication 0.5 MILLIGRAM(S): at 19:58

## 2021-10-08 RX ADMIN — RISPERIDONE 3 MILLIGRAM(S): 4 TABLET ORAL at 19:58

## 2021-10-08 RX ADMIN — RISPERIDONE 3 MILLIGRAM(S): 4 TABLET ORAL at 08:38

## 2021-10-08 RX ADMIN — Medication 1 PATCH: at 08:00

## 2021-10-08 RX ADMIN — Medication 0.5 MILLIGRAM(S): at 08:38

## 2021-10-08 RX ADMIN — Medication 1 PATCH: at 08:38

## 2021-10-08 NOTE — BH INPATIENT PSYCHIATRY PROGRESS NOTE - NSBHASSESSSUMMFT_PSY_ALL_CORE
Patient is a 40 year old male who is unemployed, living in [facility] residence with a past psychiatric history of Schizoaffective disorder, following with [facility] OPD, with multiple inpatient hospitalizations secondary to non compliance and also with a history of crack cocaine / Cannabis use disorder with multiple rehab visits and an extensive legal history (currently on parole), who was BIBA for evaluation of agitated bizarre behavior. Patient has been seen and evaluated and currently presents with disorganized behavior, delusion of paranoia, AH and aggressive behavior. Patient is a danger to others and requires involuntary psychiatric hospitalizations (DOCS). Acute decompensation possibly precipitated by substance use, particularly in context of medication nonadherence.     Improving though continues to demonstrate grandiose delusions, illogical thought process, tenuous though less irritable.     Stabilizing with oral antipsychotics, then plan to transition to RANKIN. May benefit from mood stabilizer, psychotic symptoms having some manic qualities. Continuing to titrate risperidone.    Continued tenuous behavioral control, frequent emergency PRN medication for agitation.   - Risperidone 3mg BID for psychosis, preparing to transition to Risperdal Consta  - Clonidine 0.1mg BID for impulsivity/aggression (home med) presently refusing, may consider switching to guanfacine  - discussing Doxazosin for PTSD  - trazodone 100mg qhs for insomnia (home med)  - Benztropine 0.5mg BID for eps (home med)  - d/c melatonin as pt refuses  PRN Agitated psychosis:      - q6h PO risperidone 1mg ODT + lorazepam 2mg + diphenhydramine 50mg      - q6h IM haloperidol 5mg + lorazepam 2mg + diphenhydramine 50mg  - Monitor for acute aggression potential given presentation and history  - Routine observation  - Elopement precautions  - f/u A1c, lipids, prolactin, tsh (initially refused)  - encourage engagement in milieu  - coordinate dispo with outpt team   Patient is a 40 year old male who is unemployed, living in [facility] residence with a past psychiatric history of Schizoaffective disorder, following with [facility] OPD, with multiple inpatient hospitalizations secondary to non compliance and also with a history of crack cocaine / Cannabis use disorder with multiple rehab visits and an extensive legal history (currently on parole), who was BIBA for evaluation of agitated bizarre behavior. Patient has been seen and evaluated and currently presents with disorganized behavior, delusion of paranoia, AH and aggressive behavior. Patient is a danger to others and requires involuntary psychiatric hospitalizations (DOCS). Acute decompensation possibly precipitated by substance use, particularly in context of medication nonadherence.     Improving though continues to demonstrate grandiose, persecutory, and paranoid delusions, as well as somewhat pressured speech, illogical thought process, improved behavioral control, less irritable.     Stabilizing with oral antipsychotics, plan to transition to RANKIN. Presently refusing mood stabilizer (depakote made him feel bad, does not want another medication added when discussed lithium) given that his psychotic symptoms have manic qualities. Clonazepam trial for manish and paranoia.     Discontinued clonidine given continued refusal and stable BP, not interested in guanfacine.     Continued tenuous behavioral control, frequent emergency PRN medication for agitation.   - Risperidone 3mg BID for psychosis, preparing to transition to Risperdal Consta  - Clonazepam 0.5mg BID for manish/paranoia  - discussing Doxazosin for PTSD  - trazodone 100mg qhs for insomnia (home med)  - Benztropine 0.5mg BID for eps (home med)  PRN Agitated psychosis:      - q6h PO risperidone 1mg ODT + lorazepam 2mg + diphenhydramine 50mg      - q6h IM haloperidol 5mg + lorazepam 2mg + diphenhydramine 50mg  - Monitor for acute aggression potential given presentation and history  - Routine observation  - Elopement precautions  - Labs including A1c, lipids, prolactin, tsh wnl  - encourage engagement in milieu  - coordinate dispo with outpt team

## 2021-10-08 NOTE — BH INPATIENT PSYCHIATRY PROGRESS NOTE - CURRENT MEDICATION
MEDICATIONS  (STANDING):  benztropine 0.5 milliGRAM(s) Oral two times a day  nicotine - 21 mG/24Hr(s) Patch 1 Patch Transdermal daily  risperiDONE   Tablet 3 milliGRAM(s) Oral two times a day  traZODone 100 milliGRAM(s) Oral at bedtime    MEDICATIONS  (PRN):  diphenhydrAMINE 50 milliGRAM(s) Oral every 6 hours PRN agitation/impulsivity  diphenhydrAMINE   Injectable 50 milliGRAM(s) IntraMuscular every 6 hours PRN Agitation  haloperidol    Injectable 5 milliGRAM(s) IntraMuscular once PRN acute agitation  nicotine  Polacrilex Gum 4 milliGRAM(s) Oral every 3 hours PRN cravings  risperiDONE  Disintegrating Tablet 1 milliGRAM(s) Oral every 6 hours PRN acute agitation/psychosis   27-Jun-2020 18:39

## 2021-10-08 NOTE — BH INPATIENT PSYCHIATRY PROGRESS NOTE - CASE SUMMARY
Patient is a 40 year old male who is unemployed, living in [facility] residence with a past psychiatric history of Schizoaffective disorder, following with [facility] OPD, with multiple inpatient hospitalizations secondary to non compliance and also with a history of crack cocaine / Cannabis use disorder with multiple rehab visits and an extensive legal history (currently on parole), who was BIBA for evaluation of agitated bizarre behavior. Patient has been seen and evaluated and currently presents with disorganized behavior, delusion of paranoia, AH and aggressive behavior. Patient is a danger to others and requires involuntary psychiatric hospitalizations (DOCS). Acute decompensation possibly precipitated by substance use, particularly in context of medication nonadherence.     Improving though continues to demonstrate grandiose, persecutory, and paranoid delusions, as well as somewhat pressured speech, illogical thought process, improved behavioral control, less irritable.     Stabilizing with oral antipsychotics, plan to transition to RANKIN. Presently refusing mood stabilizer (depakote made him feel bad, does not want another medication added when discussed lithium) given that his psychotic symptoms have manic qualities. Clonazepam trial for manish and paranoia.     Discontinued clonidine given continued refusal and stable BP, not interested in guanfacine.     Continued tenuous behavioral control, frequent emergency PRN medication for agitation.   - Risperidone 3mg BID for psychosis, preparing to transition to Risperdal Consta  - Clonazepam 0.5mg BID for manish/paranoia  - discussing Doxazosin for PTSD  - trazodone 100mg qhs for insomnia (home med)  - Benztropine 0.5mg BID for eps (home med)  PRN Agitated psychosis:      - q6h PO risperidone 1mg ODT + lorazepam 2mg + diphenhydramine 50mg      - q6h IM haloperidol 5mg + lorazepam 2mg + diphenhydramine 50mg  - Monitor for acute aggression potential given presentation and history  - Routine observation  - Labs including A1c, lipids, prolactin, tsh wnl  - encourage engagement in milieu  - coordinate dispo with outpt team

## 2021-10-08 NOTE — BH INPATIENT PSYCHIATRY PROGRESS NOTE - PRN MEDS
MEDICATIONS  (PRN):  diphenhydrAMINE 50 milliGRAM(s) Oral every 6 hours PRN agitation/impulsivity  diphenhydrAMINE   Injectable 50 milliGRAM(s) IntraMuscular every 6 hours PRN Agitation  haloperidol    Injectable 5 milliGRAM(s) IntraMuscular once PRN acute agitation  nicotine  Polacrilex Gum 4 milliGRAM(s) Oral every 3 hours PRN cravings  risperiDONE  Disintegrating Tablet 1 milliGRAM(s) Oral every 6 hours PRN acute agitation/psychosis

## 2021-10-08 NOTE — BH INPATIENT PSYCHIATRY PROGRESS NOTE - NSBHMETABOLIC_PSY_ALL_CORE_FT
BMI: BMI (kg/m2): 28.3 (10-02-21 @ 15:39)  HbA1c:   Glucose:   BP: 103/67 (10-08-21 @ 08:56) (103/67 - 103/67)  Lipid Panel:  BMI: BMI (kg/m2): 28.3 (10-02-21 @ 15:39)  HbA1c: A1C with Estimated Average Glucose Result: 5.1 % (10-08-21 @ 10:19)    Glucose:   BP: 103/67 (10-08-21 @ 08:56) (103/67 - 103/67)  Lipid Panel: Date/Time: 10-08-21 @ 10:17  Cholesterol, Serum: 171  Direct LDL: --  HDL Cholesterol, Serum: 50  Total Cholesterol/HDL Ration Measurement: --  Triglycerides, Serum: 112

## 2021-10-08 NOTE — BH INPATIENT PSYCHIATRY PROGRESS NOTE - NSBHFUPINTERVALHXFT_PSY_A_CORE
Patient is followed up for Schizoaffective Disorder. Patient discussed with nursing staff. Chart, medications and labs reviewed.  Patient remains high risk for aggression, improved though still tenuous behavioral control on the unit.  Patient had refreshing sleep, has good appetite.  Patient says he feels traumatized by his being held against his will at the unit, perseverates over wanting alcohol, nicotine, or any other type of drug to feel an immediate surge of euphoria. Patient continues to demonstrate delusions; claims to have attended dance parties at the unit's basketball court in 1998; claims to have been a patients at the facility from [phone] during a previous lifetime or in a different world. Patient's fidgeting increases and eye contact diminishes while speaking about his delusions. Patient is followed up for Schizoaffective Disorder. Patient discussed with nursing staff. Chart, medications and labs reviewed.  Patient remains high risk for aggression, improved though still tenuous behavioral control on the unit.    Patient had improved sleep though reports feeling distressed as though other people were in bed with him, which he associates with the thought of other patients having previously slept in that bed, but ultimately this distress passes and he is able to sleep. Waking in the morning feeling well-rested. Reports he is generally alert and active during the day. Feels the risperidone has been helping improve the clarity of his thinking, confirms agreement to risperidone consta. Revisited discussion of trauma, pt not interested in trial of doxazosin. Pt reports continuing to prefer to self-medicate with alcohol, cannabis and cocaine in particular, feeling that they calm him and make him happy. Discussed risks including decompensation of mood/psychosis. He believes he is mandated to use cocaine under terms of his parole, unable to reality test on this point, encouraged to clarify with  before use. More open to discussion of mental health condition than previously. Continues to feel traumatized by his being held against his will at the unit, telling staff he wants alcohol, nicotine, or any other type of drug to feel happy. Claims to have attended dance parties at the unit's basketball court in 1998; claims to have been a patients at the facility from [phone] during a previous lifetime or in a different world. Patient's fidgeting increases and eye contact diminishes while speaking about his delusions. Continues to demonstrate grandiosity, paranoia, persecutory delusions, often with sexual or monetary focus. Better able to tolerate not being immediately discharged. Discussed and agreed to trial of clonazepam during hospitalization.

## 2021-10-08 NOTE — BH INPATIENT PSYCHIATRY PROGRESS NOTE - NSBHCHARTREVIEWVS_PSY_A_CORE FT
Vital Signs Last 24 Hrs  T(C): 36.4 (10-07-21 @ 22:09), Max: 36.6 (10-07-21 @ 19:40)  T(F): 97.6 (10-07-21 @ 22:09), Max: 97.8 (10-07-21 @ 19:40)  HR: 86 (10-08-21 @ 08:56) (86 - 86)  BP: 103/67 (10-08-21 @ 08:56) (103/67 - 103/67)  BP(mean): --  RR: 18 (10-07-21 @ 19:40) (18 - 18)  SpO2: 100% (10-07-21 @ 19:40) (100% - 100%)    Orthostatic VS  10-07-21 @ 19:40  Lying BP: --/-- HR: --  Sitting BP: 115/76 HR: 78  Standing BP: 122/76 HR: 84  Site: --  Mode: --  Orthostatic VS  10-07-21 @ 07:30  Lying BP: --/-- HR: --  Sitting BP: 109/62 HR: 60  Standing BP: 97/60 HR: 84  Site: --  Mode: electronic   Vital Signs Last 24 Hrs  T(C): 36.3 (10-08-21 @ 14:41), Max: 36.6 (10-07-21 @ 19:40)  T(F): 97.4 (10-08-21 @ 14:41), Max: 97.8 (10-07-21 @ 19:40)  HR: 86 (10-08-21 @ 08:56) (86 - 86)  BP: 103/67 (10-08-21 @ 08:56) (103/67 - 103/67)  BP(mean): --  RR: 18 (10-07-21 @ 19:40) (18 - 18)  SpO2: 100% (10-07-21 @ 19:40) (100% - 100%)    Orthostatic VS  10-07-21 @ 19:40  Lying BP: --/-- HR: --  Sitting BP: 115/76 HR: 78  Standing BP: 122/76 HR: 84  Site: --  Mode: --  Orthostatic VS  10-07-21 @ 07:30  Lying BP: --/-- HR: --  Sitting BP: 109/62 HR: 60  Standing BP: 97/60 HR: 84  Site: --  Mode: electronic

## 2021-10-09 PROCEDURE — 99232 SBSQ HOSP IP/OBS MODERATE 35: CPT

## 2021-10-09 RX ADMIN — RISPERIDONE 3 MILLIGRAM(S): 4 TABLET ORAL at 21:56

## 2021-10-09 RX ADMIN — Medication 0.5 MILLIGRAM(S): at 08:48

## 2021-10-09 RX ADMIN — Medication 0.5 MILLIGRAM(S): at 21:57

## 2021-10-09 RX ADMIN — Medication 50 MILLIGRAM(S): at 02:26

## 2021-10-09 RX ADMIN — Medication 100 MILLIGRAM(S): at 21:56

## 2021-10-09 RX ADMIN — RISPERIDONE 3 MILLIGRAM(S): 4 TABLET ORAL at 08:48

## 2021-10-09 RX ADMIN — RISPERIDONE 1 MILLIGRAM(S): 4 TABLET ORAL at 02:27

## 2021-10-09 NOTE — BH INPATIENT PSYCHIATRY PROGRESS NOTE - NSBHFUPINTERVALHXFT_PSY_A_CORE
Patient is followed up for psychosis. Chart, medications and labs reviewed.  Patient is discussed with nursing staff. No significant overnight issues.  Per nursing patient remains compliant with all standing medications, no SE. He remains with irritable aura, but has been in fair behavioral control. No prn’s for aggression.    Patient is interviewed at bedside. Patient remains disorganized, appears internally preoccupied however intensity of symptoms appears attenuated. Denies SI/HI, no plan or intent.   He is calmer during interview than in prior days, but reports “I don’t really feel like talking.”   No mention of sleep disturbances or appetite concerns.  Remains compliant with medications, no reported or observed adverse effects.  No akithisia, EPS, or tremors.   Feels the risperidone has been helpful.  Continues to demonstrate grandiosity, paranoia, persecutory delusions. Self- care is good, hygiene shows incremental improvements.  Remains in behavioral control.  Pt offers no complaints, but is discharge focus. No acute medical concerns, VSS.  Will continue to provide therapeutic support.

## 2021-10-09 NOTE — BH INPATIENT PSYCHIATRY PROGRESS NOTE - NSBHASSESSSUMMFT_PSY_ALL_CORE
Patient is a 40 year old male who is unemployed, living in [facility] residence with a past psychiatric history of Schizoaffective disorder, following with [facility] OPD, with multiple inpatient hospitalizations secondary to non compliance and also with a history of crack cocaine / Cannabis use disorder with multiple rehab visits and an extensive legal history (currently on parole), who was BIBA for evaluation of agitated bizarre behavior. Patient has been seen and evaluated and currently presents with disorganized behavior, delusion of paranoia, AH and aggressive behavior. Patient is a danger to others and requires involuntary psychiatric hospitalizations (DOCS). Acute decompensation possibly precipitated by substance use, particularly in context of medication nonadherence.     Improving though continues to demonstrate grandiose, persecutory, and paranoid delusions, as well as somewhat pressured speech, illogical thought process, improved behavioral control, less irritable.     Stabilizing with oral antipsychotics, plan to transition to RANKIN. Presently refusing mood stabilizer (depakote made him feel bad, does not want another medication added when discussed lithium) given that his psychotic symptoms have manic qualities. Clonazepam trial for manish and paranoia.     Discontinued clonidine given continued refusal and stable BP, not interested in guanfacine.     Continued tenuous behavioral control, frequent emergency PRN medication for agitation.   - Risperidone 3mg BID for psychosis, preparing to transition to Risperdal Consta  - Clonazepam 0.5mg BID for manish/paranoia  - discussing Doxazosin for PTSD  - trazodone 100mg qhs for insomnia (home med)  - Benztropine 0.5mg BID for eps (home med)  PRN Agitated psychosis:      - q6h PO risperidone 1mg ODT + lorazepam 2mg + diphenhydramine 50mg      - q6h IM haloperidol 5mg + lorazepam 2mg + diphenhydramine 50mg  - Monitor for acute aggression potential given presentation and history  - Routine observation  - Elopement precautions  - Labs including A1c, lipids, prolactin, tsh wnl  - encourage engagement in milieu  - coordinate dispo with outpt team

## 2021-10-09 NOTE — BH INPATIENT PSYCHIATRY PROGRESS NOTE - CURRENT MEDICATION
MEDICATIONS  (STANDING):  benztropine 0.5 milliGRAM(s) Oral two times a day  clonazePAM  Tablet 0.5 milliGRAM(s) Oral two times a day  nicotine - 21 mG/24Hr(s) Patch 1 Patch Transdermal daily  risperiDONE   Tablet 3 milliGRAM(s) Oral two times a day  traZODone 100 milliGRAM(s) Oral at bedtime    MEDICATIONS  (PRN):  diphenhydrAMINE 50 milliGRAM(s) Oral every 6 hours PRN agitation/impulsivity  diphenhydrAMINE   Injectable 50 milliGRAM(s) IntraMuscular every 6 hours PRN Agitation  haloperidol    Injectable 5 milliGRAM(s) IntraMuscular once PRN acute agitation  nicotine  Polacrilex Gum 4 milliGRAM(s) Oral every 3 hours PRN cravings  risperiDONE  Disintegrating Tablet 1 milliGRAM(s) Oral every 6 hours PRN acute agitation/psychosis

## 2021-10-09 NOTE — BH INPATIENT PSYCHIATRY PROGRESS NOTE - NSBHMETABOLIC_PSY_ALL_CORE_FT
BMI: BMI (kg/m2): 28.3 (10-02-21 @ 15:39)  HbA1c: A1C with Estimated Average Glucose Result: 5.1 % (10-08-21 @ 10:19)    Glucose:   BP: 103/67 (10-08-21 @ 08:56) (103/67 - 103/67)  Lipid Panel: Date/Time: 10-08-21 @ 10:17  Cholesterol, Serum: 171  Direct LDL: --  HDL Cholesterol, Serum: 50  Total Cholesterol/HDL Ration Measurement: --  Triglycerides, Serum: 112

## 2021-10-09 NOTE — BH INPATIENT PSYCHIATRY PROGRESS NOTE - NSBHCHARTREVIEWVS_PSY_A_CORE FT
Vital Signs Last 24 Hrs  T(C): 36.7 (10-09-21 @ 06:07), Max: 37.1 (10-08-21 @ 18:54)  T(F): 98.1 (10-09-21 @ 06:07), Max: 98.8 (10-08-21 @ 18:54)  HR: 86 (10-08-21 @ 08:56) (86 - 86)  BP: 103/67 (10-08-21 @ 08:56) (103/67 - 103/67)  BP(mean): --  RR: --  SpO2: --    Orthostatic VS  10-08-21 @ 18:54  Lying BP: --/-- HR: --  Sitting BP: 136/79 HR: 76  Standing BP: 110/74 HR: 83  Site: --  Mode: --  Orthostatic VS  10-07-21 @ 19:40  Lying BP: --/-- HR: --  Sitting BP: 115/76 HR: 78  Standing BP: 122/76 HR: 84  Site: --  Mode: --  Orthostatic VS  10-07-21 @ 07:30  Lying BP: --/-- HR: --  Sitting BP: 109/62 HR: 60  Standing BP: 97/60 HR: 84  Site: --  Mode: electronic   Vital Signs Last 24 Hrs  T(C): 36.7 (10-09-21 @ 06:07), Max: 37.1 (10-08-21 @ 18:54)  T(F): 98.1 (10-09-21 @ 06:07), Max: 98.8 (10-08-21 @ 18:54)  HR: 86 (10-08-21 @ 08:56) (86 - 86)  BP: 103/67 (10-08-21 @ 08:56) (103/67 - 103/67)  BP(mean): --  RR: --  SpO2: --    Orthostatic VS  10-09-21 @ 07:50  Lying BP: --/-- HR: --  Sitting BP: 98/72 HR: 85  Standing BP: --/-- HR: --  Site: --  Mode: --  Orthostatic VS  10-08-21 @ 18:54  Lying BP: --/-- HR: --  Sitting BP: 136/79 HR: 76  Standing BP: 110/74 HR: 83  Site: --  Mode: --  Orthostatic VS  10-07-21 @ 19:40  Lying BP: --/-- HR: --  Sitting BP: 115/76 HR: 78  Standing BP: 122/76 HR: 84  Site: --  Mode: --

## 2021-10-10 RX ADMIN — Medication 0.5 MILLIGRAM(S): at 20:40

## 2021-10-10 RX ADMIN — Medication 0.5 MILLIGRAM(S): at 08:58

## 2021-10-10 RX ADMIN — Medication 0.5 MILLIGRAM(S): at 20:39

## 2021-10-10 RX ADMIN — RISPERIDONE 3 MILLIGRAM(S): 4 TABLET ORAL at 20:40

## 2021-10-10 RX ADMIN — RISPERIDONE 3 MILLIGRAM(S): 4 TABLET ORAL at 08:58

## 2021-10-10 RX ADMIN — Medication 100 MILLIGRAM(S): at 20:40

## 2021-10-11 LAB — SARS-COV-2 RNA SPEC QL NAA+PROBE: SIGNIFICANT CHANGE UP

## 2021-10-11 PROCEDURE — 99232 SBSQ HOSP IP/OBS MODERATE 35: CPT

## 2021-10-11 RX ADMIN — Medication 0.5 MILLIGRAM(S): at 20:07

## 2021-10-11 RX ADMIN — Medication 100 MILLIGRAM(S): at 20:07

## 2021-10-11 RX ADMIN — Medication 0.5 MILLIGRAM(S): at 09:06

## 2021-10-11 RX ADMIN — Medication 1 PATCH: at 09:06

## 2021-10-11 RX ADMIN — Medication 0.5 MILLIGRAM(S): at 20:08

## 2021-10-11 RX ADMIN — RISPERIDONE 3 MILLIGRAM(S): 4 TABLET ORAL at 20:07

## 2021-10-11 RX ADMIN — RISPERIDONE 3 MILLIGRAM(S): 4 TABLET ORAL at 09:06

## 2021-10-11 NOTE — BH INPATIENT PSYCHIATRY PROGRESS NOTE - MODIFICATIONS
Edited text directly to avoid confusion.  Wrote majority and edited text directly to avoid confusion.

## 2021-10-11 NOTE — BH INPATIENT PSYCHIATRY PROGRESS NOTE - NSBHCHARTREVIEWVS_PSY_A_CORE FT
Vital Signs Last 24 Hrs  T(C): 36.6 (10-11-21 @ 08:25), Max: 36.8 (10-10-21 @ 19:07)  T(F): 97.8 (10-11-21 @ 08:25), Max: 98.2 (10-10-21 @ 19:07)  HR: --  BP: --  BP(mean): --  RR: --  SpO2: --    Orthostatic VS  10-11-21 @ 08:25  Lying BP: --/-- HR: --  Sitting BP: --/-- HR: --  Standing BP: 98/62 HR: 107  Site: --  Mode: --  Orthostatic VS  10-10-21 @ 19:07  Lying BP: --/-- HR: --  Sitting BP: 106/65 HR: 65  Standing BP: 97/61 HR: 87  Site: --  Mode: --  Orthostatic VS  10-10-21 @ 09:16  Lying BP: --/-- HR: --  Sitting BP: 110/62 HR: 86  Standing BP: 91/58 HR: 111  Site: --  Mode: --  Orthostatic VS  10-09-21 @ 19:12  Lying BP: --/-- HR: --  Sitting BP: 116/62 HR: 72  Standing BP: 96/60 HR: 108  Site: --  Mode: --   Vital Signs Last 24 Hrs  T(C): 36.4 (10-11-21 @ 14:14), Max: 36.8 (10-10-21 @ 19:07)  T(F): 97.5 (10-11-21 @ 14:14), Max: 98.2 (10-10-21 @ 19:07)  HR: --  BP: --  BP(mean): --  RR: --  SpO2: --    Orthostatic VS  10-11-21 @ 08:25  Lying BP: --/-- HR: --  Sitting BP: --/-- HR: --  Standing BP: 98/62 HR: 107  Site: --  Mode: --  Orthostatic VS  10-10-21 @ 19:07  Lying BP: --/-- HR: --  Sitting BP: 106/65 HR: 65  Standing BP: 97/61 HR: 87  Site: --  Mode: --  Orthostatic VS  10-10-21 @ 09:16  Lying BP: --/-- HR: --  Sitting BP: 110/62 HR: 86  Standing BP: 91/58 HR: 111  Site: --  Mode: --  Orthostatic VS  10-09-21 @ 19:12  Lying BP: --/-- HR: --  Sitting BP: 116/62 HR: 72  Standing BP: 96/60 HR: 108  Site: --  Mode: --   Vital Signs Last 24 Hrs  T(C): 36.3 (10-12-21 @ 14:14), Max: 36.4 (10-11-21 @ 19:08)  T(F): 97.4 (10-12-21 @ 14:14), Max: 97.5 (10-11-21 @ 19:08)  HR: --  BP: --  BP(mean): --  RR: 17 (10-12-21 @ 08:20) (17 - 18)  SpO2: 98% (10-11-21 @ 20:40) (98% - 98%)    Orthostatic VS  10-12-21 @ 10:47  Lying BP: --/-- HR: --  Sitting BP: 105/72 HR: 89  Standing BP: --/-- HR: --  Site: --  Mode: --  Orthostatic VS  10-12-21 @ 08:20  Lying BP: --/-- HR: --  Sitting BP: 103/60 HR: 132  Standing BP: 101/60 HR: 112  Site: --  Mode: electronic  Orthostatic VS  10-11-21 @ 19:08  Lying BP: --/-- HR: --  Sitting BP: 106/62 HR: 87  Standing BP: 99/64 HR: 95  Site: --  Mode: --  Orthostatic VS  10-11-21 @ 08:25  Lying BP: --/-- HR: --  Sitting BP: --/-- HR: --  Standing BP: 98/62 HR: 107  Site: --  Mode: --  Orthostatic VS  10-10-21 @ 19:07  Lying BP: --/-- HR: --  Sitting BP: 106/65 HR: 65  Standing BP: 97/61 HR: 87  Site: --  Mode: --

## 2021-10-11 NOTE — BH INPATIENT PSYCHIATRY PROGRESS NOTE - CASE SUMMARY
Patient is a 40 year old male who is unemployed, living in [facility] residence with a past psychiatric history of Schizoaffective disorder, following with [facility] OPD, with multiple inpatient hospitalizations secondary to non compliance and also with a history of crack cocaine / Cannabis use disorder with multiple rehab visits and an extensive legal history (currently on parole), who was BIBA for evaluation of agitated bizarre behavior. Patient has been seen and evaluated and currently presents with disorganized behavior, delusion of paranoia, AH and aggressive behavior. Patient is a danger to others and requires involuntary psychiatric hospitalizations (DOCS). Acute decompensation possibly precipitated by substance use, particularly in context of medication nonadherence.     Improving though continues to demonstrate grandiose, persecutory, and paranoid delusions, as well as somewhat pressured speech, illogical thought process, improved behavioral control, less irritable.     Stabilizing with oral antipsychotics, plan to transition to RANKIN. Presently refusing mood stabilizer (depakote made him feel bad, does not want another medication added when discussed lithium) given that his psychotic symptoms have manic qualities. Clonazepam trial for mnaish and paranoia.     Discontinued clonidine given continued refusal and stable BP, not interested in guanfacine.     Continued tenuous behavioral control, frequent emergency PRN medication for agitation.   - Risperidone 3mg BID for psychosis, preparing to transition to Risperdal Consta  - Clonazepam 0.5mg BID for manish/paranoia  - discussing Doxazosin for PTSD  - trazodone 100mg qhs for insomnia (home med)  - Benztropine 0.5mg BID for eps (home med)  PRN Agitated psychosis:      - q6h PO risperidone 1mg ODT + lorazepam 2mg + diphenhydramine 50mg      - q6h IM haloperidol 5mg + lorazepam 2mg + diphenhydramine 50mg  - Monitor for acute aggression potential given presentation and history  - Routine observation  - Labs including A1c, lipids, prolactin, tsh wnl  - encourage engagement in milieu  - coordinate dispo with outpt team

## 2021-10-11 NOTE — BH INPATIENT PSYCHIATRY PROGRESS NOTE - NSBHASSESSSUMMFT_PSY_ALL_CORE
Patient is a 40 year old male who is unemployed, living in [facility] residence with a past psychiatric history of Schizoaffective disorder, following with [facility] OPD, with multiple inpatient hospitalizations secondary to non compliance and also with a history of crack cocaine / Cannabis use disorder with multiple rehab visits and an extensive legal history (currently on parole), who was BIBA for evaluation of agitated bizarre behavior. Patient has been seen and evaluated and currently presents with disorganized behavior, delusion of paranoia, AH and aggressive behavior. Patient is a danger to others and requires involuntary psychiatric hospitalizations (DOCS). Acute decompensation possibly precipitated by substance use, particularly in context of medication nonadherence.     Improving though continues to demonstrate grandiose, persecutory, and paranoid delusions, as well as somewhat pressured speech, illogical thought process, improved behavioral control, less irritable.     Stabilizing with oral antipsychotics, plan to transition to RANKIN. Presently refusing mood stabilizer (depakote made him feel bad, does not want another medication added when discussed lithium) given that his psychotic symptoms have manic qualities. Clonazepam trial for manish and paranoia.     Discontinued clonidine given continued refusal and stable BP, not interested in guanfacine.     Continued tenuous behavioral control, frequent emergency PRN medication for agitation.   - Risperidone 3mg BID for psychosis, preparing to transition to Risperdal Consta  - Clonazepam 0.5mg BID for manish/paranoia  - discussing Doxazosin for PTSD  - trazodone 100mg qhs for insomnia (home med)  - Benztropine 0.5mg BID for eps (home med)  PRN Agitated psychosis:      - q6h PO risperidone 1mg ODT + lorazepam 2mg + diphenhydramine 50mg      - q6h IM haloperidol 5mg + lorazepam 2mg + diphenhydramine 50mg  - Monitor for acute aggression potential given presentation and history  - Routine observation  - Elopement precautions  - Labs including A1c, lipids, prolactin, tsh wnl  - encourage engagement in milieu  - coordinate dispo with outpt team   Patient is a 40 year old male who is unemployed, living in [facility] residence with a past psychiatric history of Schizoaffective disorder, following with [facility] OPD, with multiple inpatient hospitalizations secondary to non compliance and also with a history of crack cocaine / Cannabis use disorder with multiple rehab visits and an extensive legal history (currently on parole), who was BIBA for evaluation of agitated bizarre behavior. Patient has been seen and evaluated and currently presents with disorganized behavior, delusion of paranoia, AH and aggressive behavior. Patient is a danger to others and requires involuntary psychiatric hospitalizations (DOCS). Acute decompensation possibly precipitated by substance use, particularly in context of medication nonadherence.     Improving though continues to demonstrate grandiose, persecutory, and paranoid delusions, as well as somewhat pressured speech, illogical thought process, improved behavioral control, less irritable.     Stabilizing with oral antipsychotics, plan to transition to RANKIN. Presently refusing mood stabilizer (depakote made him feel bad, does not want another medication added when discussed lithium) given that his psychotic symptoms have manic qualities. Clonazepam trial for manish and paranoia.     Discontinued clonidine given continued refusal and stable BP, not interested in guanfacine.     Continued tenuous behavioral control, frequent emergency PRN medication for agitation.   - Risperidone 3mg BID for psychosis, preparing to transition to Risperdal Consta  - Clonazepam 0.5mg BID for manish/paranoia  - discussing Doxazosin for PTSD  - trazodone 100mg qhs for insomnia (home med)  - Benztropine 0.5mg BID for eps (home med)  - discussing mood stabilizer options  PRN Agitated psychosis:      - q6h PO risperidone 1mg ODT + lorazepam 2mg + diphenhydramine 50mg      - q6h IM haloperidol 5mg + lorazepam 2mg + diphenhydramine 50mg  - Monitor for acute aggression potential given presentation and history  - Routine observation  - Elopement precautions  - Labs including A1c, lipids, prolactin, tsh wnl  - encourage engagement in milieu  - coordinate dispo with outpt team

## 2021-10-11 NOTE — BH INPATIENT PSYCHIATRY PROGRESS NOTE - CURRENT MEDICATION
MEDICATIONS  (STANDING):  benztropine 0.5 milliGRAM(s) Oral two times a day  clonazePAM  Tablet 0.5 milliGRAM(s) Oral two times a day  nicotine - 21 mG/24Hr(s) Patch 1 Patch Transdermal daily  risperiDONE   Tablet 3 milliGRAM(s) Oral two times a day  traZODone 100 milliGRAM(s) Oral at bedtime    MEDICATIONS  (PRN):  diphenhydrAMINE 50 milliGRAM(s) Oral every 6 hours PRN agitation/impulsivity  diphenhydrAMINE   Injectable 50 milliGRAM(s) IntraMuscular every 6 hours PRN Agitation  haloperidol    Injectable 5 milliGRAM(s) IntraMuscular once PRN acute agitation  nicotine  Polacrilex Gum 4 milliGRAM(s) Oral every 3 hours PRN cravings  risperiDONE  Disintegrating Tablet 1 milliGRAM(s) Oral every 6 hours PRN acute agitation/psychosis   MEDICATIONS  (STANDING):  benztropine 0.5 milliGRAM(s) Oral two times a day  clonazePAM Oral Disintegrating Tablet 0.25 milliGRAM(s) Oral two times a day  nicotine - 21 mG/24Hr(s) Patch 1 Patch Transdermal daily  risperiDONE   Tablet 3 milliGRAM(s) Oral two times a day  traZODone 100 milliGRAM(s) Oral at bedtime    MEDICATIONS  (PRN):  diphenhydrAMINE 50 milliGRAM(s) Oral every 6 hours PRN agitation/impulsivity  diphenhydrAMINE   Injectable 50 milliGRAM(s) IntraMuscular every 6 hours PRN Agitation  haloperidol    Injectable 5 milliGRAM(s) IntraMuscular once PRN acute agitation  nicotine  Polacrilex Gum 4 milliGRAM(s) Oral every 3 hours PRN cravings  risperiDONE  Disintegrating Tablet 1 milliGRAM(s) Oral every 6 hours PRN acute agitation/psychosis

## 2021-10-11 NOTE — BH INPATIENT PSYCHIATRY PROGRESS NOTE - NSBHMSETHTPROC_PSY_A_CORE
Disorganized/Circumstantial/Tangential/Illogical/Impaired reasoning Disorganized/Tangential/Perseverative/Illogical/Impaired reasoning

## 2021-10-11 NOTE — BH INPATIENT PSYCHIATRY PROGRESS NOTE - NSBHMETABOLIC_PSY_ALL_CORE_FT
BMI: BMI (kg/m2): 28.3 (10-02-21 @ 15:39)  HbA1c: A1C with Estimated Average Glucose Result: 5.1 % (10-08-21 @ 10:19)    Glucose:   BP: --  Lipid Panel: Date/Time: 10-08-21 @ 10:17  Cholesterol, Serum: 171  Direct LDL: --  HDL Cholesterol, Serum: 50  Total Cholesterol/HDL Ration Measurement: --  Triglycerides, Serum: 112

## 2021-10-12 PROCEDURE — 99232 SBSQ HOSP IP/OBS MODERATE 35: CPT

## 2021-10-12 RX ORDER — RISPERIDONE 4 MG/1
50 TABLET ORAL ONCE
Refills: 0 | Status: COMPLETED | OUTPATIENT
Start: 2021-10-12 | End: 2021-10-12

## 2021-10-12 RX ORDER — CLONAZEPAM 1 MG
0.25 TABLET ORAL
Refills: 0 | Status: DISCONTINUED | OUTPATIENT
Start: 2021-10-12 | End: 2021-10-18

## 2021-10-12 RX ADMIN — Medication 0.25 MILLIGRAM(S): at 20:38

## 2021-10-12 RX ADMIN — Medication 1 PATCH: at 08:22

## 2021-10-12 RX ADMIN — RISPERIDONE 50 MILLIGRAM(S): 4 TABLET ORAL at 14:06

## 2021-10-12 RX ADMIN — Medication 100 MILLIGRAM(S): at 20:38

## 2021-10-12 RX ADMIN — RISPERIDONE 3 MILLIGRAM(S): 4 TABLET ORAL at 20:38

## 2021-10-12 RX ADMIN — Medication 0.5 MILLIGRAM(S): at 20:38

## 2021-10-12 RX ADMIN — Medication 0.5 MILLIGRAM(S): at 08:25

## 2021-10-12 RX ADMIN — RISPERIDONE 3 MILLIGRAM(S): 4 TABLET ORAL at 08:25

## 2021-10-12 NOTE — BH INPATIENT PSYCHIATRY PROGRESS NOTE - NSTXIMPULSGOALOTHER_PSY_ALL_CORE
Maintain behavioral control for seven consecutive days.

## 2021-10-12 NOTE — BH INPATIENT PSYCHIATRY PROGRESS NOTE - NSBHFUPINTERVALHXFT_PSY_A_CORE
Patient is followed up for Schizoaffective Disorder. Patient discussed with nursing staff. Chart, medications and labs reviewed.  Patient remains high risk for aggression, improved though still tenuous behavioral control on the unit. Patient will be allowed to wear street clothing beginning today, but not shoes.    Patient encountered in his room; is groggy but attentive and cooperative. Continues to demonstrate persecutory delusions with corruption and rape as common themes; describes residents and staff at previous residents as being rapists; expresses mistrust of parole officers, whom he finds physically threatening and able to commit crimes with impunity; says "some police officers have never been a child" and thus want to have sex with children in order to experience childhood. Patient claims to have had sex for the first time at age 2 or 3 with a 5-year-old. Patient says he was sentenced to 5 years penitentiary and 5 years probation for burglary, for which he maintains his innocence; claims "the  was my son," "gave me paperwork saying I'm innocent," and that his aunt discarded this paperwork out a window, causing him to have to serve his sentence.    Patient expresses dissatisfaction with being unable to wear shoes in the unit despite being able to wear street clothing beginning today; patient complains of foot callouses. Patient reports good sleep and appetite; reports good mood, excitement at returning to residence. Medications discussed with patient, including decrease in dosage of klonopin due to exhaustion and initiation of lithium and IM risperdal. Housing outside of unit discussed with patient, who says he trusts "Stalin" (his "counsellor") and mistrusts and dislikes "Soila Jesus" (his psychiatric NP prior to admission). Patient requests information on how to secure an appointment with unemployment office. Patient is followed up for Schizoaffective Disorder. Patient discussed with nursing staff. Chart, medications and labs reviewed.  Patient remains high risk for aggression, improved though still tenuous behavioral control on the unit. Patient will be allowed to wear street clothing beginning today, but not shoes.    Patient encountered in his room; is groggy but attentive and cooperative; reports good sleep and appetite; reports good mood, excitement at returning to residence. Patient expresses dissatisfaction with being unable to wear shoes in the unit despite being able to wear street clothing beginning today; patient complains of foot callouses. Medications discussed with patient, including decrease in dosage of Klonopin due to exhaustion and initiation of lithium and IM Risperdal Consta.    Patient continues to demonstrate persecutory delusions with corruption, rape, and childhood as common themes; describes residents and staff at previous residents as being rapists; expresses mistrust of parole officers, whom he finds physically threatening and able to commit crimes with impunity; says "some police officers have never been a child" and thus want to have sex with children in order to experience childhood. Patient claims to have had sex for the first time at age 2 or 3 with a 5-year-old. Patient says he was sentenced to 5 years correction and 5 years probation for burglary, for which he maintains his innocence; claims "the  was my son," "gave me paperwork saying I'm innocent," and that his aunt discarded this paperwork out a window, forcing him to serve his sentence.    Housing outside of unit discussed with patient, who says he trusts "Stalin" (his "counsellor") and mistrusts and dislikes "Soila Jesus" (his psychiatric NP prior to admission). Patient requests information on how to secure an appointment with unemployment office. Patient is followed up for Schizoaffective Disorder. Patient discussed with nursing staff. Chart, medications and labs reviewed.  Patient remains high risk for aggression, improved though still tenuous behavioral control on the unit. Patient will be allowed to wear street clothing beginning today, but not shoes.    Patient encountered in his room; is groggy but attentive and cooperative; reports good sleep and appetite; reports good mood, excitement at returning to residence. Patient expresses dissatisfaction with being unable to wear shoes in the unit despite being able to wear street clothing beginning today, complains of foot callouses. Medications discussed with patient, including decrease in dosage of Klonopin due to exhaustion and initiation of lithium and IM Risperdal Consta.    Patient continues to demonstrate persecutory delusions with corruption, rape, and childhood as common themes; describes residents and staff at previous residents as being rapists; expresses mistrust of parole officers, whom he finds physically threatening and able to commit crimes with impunity; says "some police officers have never been a child" and thus want to have sex with children in order to experience childhood. Patient claims to have had sex for the first time at age 2 or 3 with a 5-year-old. Patient says he was sentenced to 5 years half-way and 5 years probation for burglary, for which he maintains his innocence; claims "the  was my son," "gave me paperwork saying I'm innocent," and that his aunt discarded this paperwork out a window, forcing him to serve his sentence.    Housing outside of unit discussed with patient, who says he trusts "Stalin" (his "counsellor") and mistrusts and dislikes "Soila Jesus" (his psychiatric NP prior to admission). Patient requests information on how to secure an appointment with unemployment office. Patient is followed up for Schizoaffective Disorder. Patient discussed with nursing staff. Chart, medications and labs reviewed.  Patient remains high risk for aggression, improved though still tenuous behavioral control on the unit. Patient will be allowed to wear street clothing beginning today, but not shoes.    Patient encountered in his room; is groggy but attentive and cooperative; reports good sleep and appetite; reports good mood, motivated to return to residence, irritable when discussing possibility of delay. Patient expresses dissatisfaction with being unable to wear shoes in the unit despite being able to wear street clothing beginning today, complains of foot callouses. Medications discussed with patient, including decrease in dosage of Klonopin due to exhaustion and IM Risperdal Consta to be given today. Discussed possible trials of lithium or doxazosin, pt refusing both, insistent that he isn't ill, that others have the real problem.     Patient continues to demonstrate persecutory delusions with corruption, rape, and childhood as common themes; describes residents and staff at previous residents as being rapists; expresses mistrust of parole officers, whom he finds physically threatening and able to commit crimes with impunity; says "some police officers have never been a child" and thus want to have sex with children in order to experience childhood. Patient claims to have had sex for the first time at age 2 or 3 with a 5-year-old. Patient says he was sentenced to 5 years [facility] and 5 years probation for burglary, for which he maintains his innocence; claims "the  was my son," "gave me paperwork saying I'm innocent," and that his aunt discarded this paperwork out a window, forcing him to serve his sentence.    Housing outside of unit discussed with patient, who says he trusts "Stalin" (his "counsellor") and mistrusts and dislikes his outpatient psychiatric NP. Patient requests information on how to secure an appointment with unemployment office, which has been looked into by GOVIND and found that he does  not qualify for though pt unable to acknowledge this when explained by GOVIND. Patient is followed up for Schizoaffective Disorder. Patient discussed with nursing staff. Chart, medications and labs reviewed.  Patient remains high risk for aggression, improved though still tenuous behavioral control on the unit. Patient will be allowed to wear street clothing beginning today, but not shoes.    Patient encountered in his room; is groggy but attentive and cooperative; reports good sleep and appetite; reports good mood, motivated to return to residence, irritable when discussing need for delay, including pounding the table and pivoting to talking about paranoid delusions of sexual violence. Patient expresses dissatisfaction with being unable to wear shoes in the unit despite being able to wear street clothing beginning today, complains of foot callouses. Medications discussed with patient, including decrease in dosage of clonazepam following brief trial and IM Risperdal Consta to be given today. Discussed possible trials of lithium or doxazosin, pt refusing both, insistent that he isn't ill, that others have the problem.     Patient continues to demonstrate persecutory delusions with corruption, rape, and childhood as common themes; describes residents and staff at previous residents as being rapists; expresses mistrust of parole officers, whom he finds physically threatening and able to commit crimes with impunity; says "some police officers have never been a child" and thus want to have sex with children in order to experience childhood. Patient claims to have had sex for the first time at age 2 or 3 with a 5-year-old. Patient says he was sentenced to 5 years longterm and 5 years probation for burglary, for which he maintains his innocence; claims "the  was my son," "gave me paperwork saying I'm innocent," and that his aunt discarded this paperwork out a window, forcing him to serve his sentence.

## 2021-10-12 NOTE — BH INPATIENT PSYCHIATRY PROGRESS NOTE - MODIFICATIONS
Edited text directly to avoid confusion.  Attending edited text extensively and directly to avoid confusion, agree with above as it includes all modifications.

## 2021-10-12 NOTE — BH INPATIENT PSYCHIATRY PROGRESS NOTE - NSBHASSESSSUMMFT_PSY_ALL_CORE
Patient is a 40 year old male who is unemployed, living in [facility] residence with a past psychiatric history of Schizoaffective disorder, following with [facility], with multiple inpatient hospitalizations secondary to non compliance and also with a history of crack cocaine / Cannabis use disorder with multiple rehab visits and an extensive legal history (currently on parole), who was BIBA for evaluation of agitated bizarre behavior. Patient has been seen and evaluated and currently presents with disorganized behavior, delusion of paranoia, AH and aggressive behavior. Patient is a danger to others and requires involuntary psychiatric hospitalizations (DOCS). Acute decompensation possibly precipitated by substance use, particularly in context of medication nonadherence.     Improving though continues to demonstrate grandiose, persecutory, and paranoid delusions, as well as somewhat pressured speech, illogical thought process, improved behavioral control, less irritable.     Stabilizing with oral antipsychotics, plan to transition to RANKIN. Presently refusing mood stabilizer (depakote made him feel bad, does not want another medication added when discussed lithium) given that his psychotic symptoms have manic qualities. Clonazepam trial for manish and paranoia.     Discontinued clonidine given continued refusal and stable BP, not interested in guanfacine.     Continued tenuous behavioral control, frequent emergency PRN medication for agitation.   - Risperidone 3mg BID for psychosis, preparing to transition to Risperdal Consta  - Clonazepam 0.5mg BID for manish/paranoia  - discussing Doxazosin for PTSD  - trazodone 100mg qhs for insomnia (home med)  - Benztropine 0.5mg BID for eps (home med)  PRN Agitated psychosis:      - q6h PO risperidone 1mg ODT + lorazepam 2mg + diphenhydramine 50mg      - q6h IM haloperidol 5mg + lorazepam 2mg + diphenhydramine 50mg  - Monitor for acute aggression potential given presentation and history  - Routine observation  - Elopement precautions  - Labs including A1c, lipids, prolactin, tsh wnl  - encourage engagement in milieu  - coordinate dispo with outpt team   Patient is a 40 year old male who is unemployed, living in [facility] residence with a past psychiatric history of Schizoaffective disorder, following with [facility] OPD, with multiple inpatient hospitalizations secondary to non compliance and also with a history of crack cocaine / Cannabis use disorder with multiple rehab visits and an extensive legal history (currently on parole), who was BIBA for evaluation of agitated bizarre behavior. Patient has been seen and evaluated and currently presents with disorganized behavior, delusion of paranoia, AH and aggressive behavior. Patient is a danger to others and requires involuntary psychiatric hospitalizations (DOCS). Acute decompensation possibly precipitated by substance use, particularly in context of medication nonadherence.     Improving though continues to demonstrate grandiose, persecutory, and paranoid delusions, as well as somewhat pressured speech, illogical thought process, improved behavioral control, less irritable.     Stabilizing with oral antipsychotics, plan to transition to RANKIN. Presently refusing mood stabilizer (depakote made him feel bad, does not want another medication added when discussed lithium) given that his psychotic symptoms have manic qualities. Clonazepam trial for manish and paranoia.     Discontinued clonidine given continued refusal and stable BP, not interested in guanfacine.     Continued tenuous behavioral control, frequent emergency PRN medication for agitation.   - Risperidone 3mg BID for psychosis        Gave Risperdal Consta 50mg IM today, requires 3wk bridge of risperidone, pt understands and agrees  - Clonazepam 0.5mg BID for manish/paranoia  - discussing lithium for mood, Doxazosin for PTSD  - trazodone 100mg qhs for insomnia (home med)  - Benztropine 0.5mg BID for eps (home med)  PRN Agitated psychosis:      - q6h PO risperidone 1mg ODT + lorazepam 2mg + diphenhydramine 50mg      - q6h IM haloperidol 5mg + lorazepam 2mg + diphenhydramine 50mg  - Monitor for acute aggression potential given presentation and history  - Routine observation  - Elopement precautions  - Labs including A1c, lipids, prolactin, tsh wnl  - encourage engagement in milieu  - coordinate dispo with outpt team   Patient is a 40 year old male who is unemployed, living in [facility] residence with a past psychiatric history of Schizoaffective disorder, following with [facility], with multiple inpatient hospitalizations secondary to non compliance and also with a history of crack cocaine / Cannabis use disorder with multiple rehab visits and an extensive legal history (currently on parole), who was BIBA for evaluation of agitated bizarre behavior. Patient has been seen and evaluated and currently presents with disorganized behavior, delusion of paranoia, AH and aggressive behavior. Patient is a danger to others and requires involuntary psychiatric hospitalizations (DOCS). Acute decompensation possibly precipitated by substance use, particularly in context of medication nonadherence.     Improving though continues to demonstrate grandiose, persecutory, and paranoid delusions, especially of others being sexually violent/predatory, also somewhat pressured speech, illogical thought process. Generally improved behavioral control, though easily exacerbated by discussion of discharge.     Stabilizing with oral antipsychotics while bridging to Risperdal Consta. Presently refusing mood stabilizers or other medications. Completing trial of clonazepam for manish/psychosis, minimal benefit, tapering. Canceled clonidine order given continued refusal. Refuses guanfacine.     Continued tenuous behavioral control, frequent emergency PRN medication for agitation.   - Risperidone 3mg BID for psychosis        Gave Risperdal Consta 50mg IM 10/12, requires 3wk bridge of risperidone, pt understands and agrees  - Clonazepam 0.25mg BID for manish/paranoia  - discussing lithium for mood, Doxazosin for PTSD  - trazodone 100mg qhs for insomnia (home med)  - Benztropine 0.5mg BID for eps (home med)  PRN Agitated psychosis:      - q6h PO risperidone 1mg ODT + lorazepam 2mg + diphenhydramine 50mg      - q6h IM haloperidol 5mg + lorazepam 2mg + diphenhydramine 50mg  - Monitor for acute aggression potential given presentation and history  - Routine observation  - Patient wearing street clothing, shoes to be given pending further stabilization and more time without elopement attempts  - Labs including A1c, lipids, prolactin, tsh wnl  - encourage engagement in milieu  - coordinate dispo with outpt team/housing

## 2021-10-12 NOTE — BH INPATIENT PSYCHIATRY PROGRESS NOTE - CURRENT MEDICATION
MEDICATIONS  (STANDING):  benztropine 0.5 milliGRAM(s) Oral two times a day  clonazePAM  Tablet 0.5 milliGRAM(s) Oral two times a day  nicotine - 21 mG/24Hr(s) Patch 1 Patch Transdermal daily  risperiDONE   Tablet 3 milliGRAM(s) Oral two times a day  risperiDONE Injectable, Long Acting 50 milliGRAM(s) IntraMuscular once  traZODone 100 milliGRAM(s) Oral at bedtime    MEDICATIONS  (PRN):  diphenhydrAMINE 50 milliGRAM(s) Oral every 6 hours PRN agitation/impulsivity  diphenhydrAMINE   Injectable 50 milliGRAM(s) IntraMuscular every 6 hours PRN Agitation  haloperidol    Injectable 5 milliGRAM(s) IntraMuscular once PRN acute agitation  nicotine  Polacrilex Gum 4 milliGRAM(s) Oral every 3 hours PRN cravings  risperiDONE  Disintegrating Tablet 1 milliGRAM(s) Oral every 6 hours PRN acute agitation/psychosis   MEDICATIONS  (STANDING):  benztropine 0.5 milliGRAM(s) Oral two times a day  clonazePAM Oral Disintegrating Tablet 0.25 milliGRAM(s) Oral two times a day  nicotine - 21 mG/24Hr(s) Patch 1 Patch Transdermal daily  risperiDONE   Tablet 3 milliGRAM(s) Oral two times a day  traZODone 100 milliGRAM(s) Oral at bedtime    MEDICATIONS  (PRN):  diphenhydrAMINE 50 milliGRAM(s) Oral every 6 hours PRN agitation/impulsivity  diphenhydrAMINE   Injectable 50 milliGRAM(s) IntraMuscular every 6 hours PRN Agitation  haloperidol    Injectable 5 milliGRAM(s) IntraMuscular once PRN acute agitation  nicotine  Polacrilex Gum 4 milliGRAM(s) Oral every 3 hours PRN cravings  risperiDONE  Disintegrating Tablet 1 milliGRAM(s) Oral every 6 hours PRN acute agitation/psychosis

## 2021-10-12 NOTE — BH INPATIENT PSYCHIATRY PROGRESS NOTE - NSBHCHARTREVIEWVS_PSY_A_CORE FT
Vital Signs Last 24 Hrs  T(C): 36.3 (10-12-21 @ 06:27), Max: 36.4 (10-11-21 @ 14:14)  T(F): 97.4 (10-12-21 @ 06:27), Max: 97.5 (10-11-21 @ 14:14)  HR: --  BP: --  BP(mean): --  RR: 17 (10-12-21 @ 08:20) (17 - 18)  SpO2: 98% (10-11-21 @ 20:40) (98% - 98%)    Orthostatic VS  10-12-21 @ 10:47  Lying BP: --/-- HR: --  Sitting BP: 105/72 HR: 89  Standing BP: --/-- HR: --  Site: --  Mode: --  Orthostatic VS  10-12-21 @ 08:20  Lying BP: --/-- HR: --  Sitting BP: 103/60 HR: 132  Standing BP: 101/60 HR: 112  Site: --  Mode: electronic  Orthostatic VS  10-11-21 @ 19:08  Lying BP: --/-- HR: --  Sitting BP: 106/62 HR: 87  Standing BP: 99/64 HR: 95  Site: --  Mode: --  Orthostatic VS  10-11-21 @ 08:25  Lying BP: --/-- HR: --  Sitting BP: --/-- HR: --  Standing BP: 98/62 HR: 107  Site: --  Mode: --  Orthostatic VS  10-10-21 @ 19:07  Lying BP: --/-- HR: --  Sitting BP: 106/65 HR: 65  Standing BP: 97/61 HR: 87  Site: --  Mode: --   Vital Signs Last 24 Hrs  T(C): 36.3 (10-12-21 @ 14:14), Max: 36.4 (10-11-21 @ 19:08)  T(F): 97.4 (10-12-21 @ 14:14), Max: 97.5 (10-11-21 @ 19:08)  HR: --  BP: --  BP(mean): --  RR: 17 (10-12-21 @ 08:20) (17 - 18)  SpO2: 98% (10-11-21 @ 20:40) (98% - 98%)    Orthostatic VS  10-12-21 @ 10:47  Lying BP: --/-- HR: --  Sitting BP: 105/72 HR: 89  Standing BP: --/-- HR: --  Site: --  Mode: --  Orthostatic VS  10-12-21 @ 08:20  Lying BP: --/-- HR: --  Sitting BP: 103/60 HR: 132  Standing BP: 101/60 HR: 112  Site: --  Mode: electronic  Orthostatic VS  10-11-21 @ 19:08  Lying BP: --/-- HR: --  Sitting BP: 106/62 HR: 87  Standing BP: 99/64 HR: 95  Site: --  Mode: --  Orthostatic VS  10-11-21 @ 08:25  Lying BP: --/-- HR: --  Sitting BP: --/-- HR: --  Standing BP: 98/62 HR: 107  Site: --  Mode: --  Orthostatic VS  10-10-21 @ 19:07  Lying BP: --/-- HR: --  Sitting BP: 106/65 HR: 65  Standing BP: 97/61 HR: 87  Site: --  Mode: --

## 2021-10-12 NOTE — BH INPATIENT PSYCHIATRY PROGRESS NOTE - CASE SUMMARY
Patient is a 40 year old male who is unemployed, living in [facility] residence with a past psychiatric history of Schizoaffective disorder, following with [facility] OPD, with multiple inpatient hospitalizations secondary to non compliance and also with a history of crack cocaine / Cannabis use disorder with multiple rehab visits and an extensive legal history (currently on parole), who was BIBA for evaluation of agitated bizarre behavior. Patient has been seen and evaluated and currently presents with disorganized behavior, delusion of paranoia, AH and aggressive behavior. Patient is a danger to others and requires involuntary psychiatric hospitalizations (DOCS). Acute decompensation possibly precipitated by substance use, particularly in context of medication nonadherence.     Improving though continues to demonstrate grandiose, persecutory, and paranoid delusions, as well as somewhat pressured speech, illogical thought process, improved behavioral control, less irritable.     Stabilizing with oral antipsychotics, plan to transition to RANKIN. Presently refusing mood stabilizer (depakote made him feel bad, does not want another medication added when discussed lithium) given that his psychotic symptoms have manic qualities. Clonazepam trial for manish and paranoia.     Discontinued clonidine given continued refusal and stable BP, not interested in guanfacine.     Continued tenuous behavioral control, frequent emergency PRN medication for agitation.   - Risperidone 3mg BID for psychosis, preparing to transition to Risperdal Consta  - Clonazepam 0.5mg BID for manish/paranoia  - discussing Doxazosin for PTSD  - trazodone 100mg qhs for insomnia (home med)  - Benztropine 0.5mg BID for eps (home med)  PRN Agitated psychosis:      - q6h PO risperidone 1mg ODT + lorazepam 2mg + diphenhydramine 50mg      - q6h IM haloperidol 5mg + lorazepam 2mg + diphenhydramine 50mg  - Monitor for acute aggression potential given presentation and history  - Routine observation  - Labs including A1c, lipids, prolactin, tsh wnl  - encourage engagement in milieu  - coordinate dispo with outpt team   Patient is a 40 year old male who is unemployed, living in [facility] residence with a past psychiatric history of Schizoaffective disorder, following with [facility] OPD, with multiple inpatient hospitalizations secondary to non compliance and also with a history of crack cocaine / Cannabis use disorder with multiple rehab visits and an extensive legal history (currently on parole), who was BIBA for evaluation of agitated bizarre behavior. Patient has been seen and evaluated and currently presents with disorganized behavior, delusion of paranoia, AH and aggressive behavior. Patient is a danger to others and requires involuntary psychiatric hospitalizations (DOCS). Acute decompensation possibly precipitated by substance use, particularly in context of medication nonadherence.     Improving though continues to demonstrate grandiose, persecutory, and paranoid delusions, especially of others being sexually violent/predatory, also somewhat pressured speech, illogical thought process. Generally improved behavioral control, though easily exacerbated by discussion of discharge.     Stabilizing with oral antipsychotics while bridging to Risperdal Consta. Presently refusing mood stabilizers or other medications. Completing trial of clonazepam for manish/psychosis, minimal benefit, tapering. Canceled clonidine order given continued refusal. Refuses guanfacine.     Continued tenuous behavioral control, frequent emergency PRN medication for agitation.   - Risperidone 3mg BID for psychosis        Gave Risperdal Consta 50mg IM 10/12, requires 3wk bridge of risperidone, pt understands and agrees  - Clonazepam 0.25mg BID for manish/paranoia  - discussing lithium for mood, Doxazosin for PTSD  - trazodone 100mg qhs for insomnia (home med)  - Benztropine 0.5mg BID for eps (home med)  PRN Agitated psychosis:      - q6h PO risperidone 1mg ODT + lorazepam 2mg + diphenhydramine 50mg      - q6h IM haloperidol 5mg + lorazepam 2mg + diphenhydramine 50mg  - Monitor for acute aggression potential given presentation and history  - Routine observation  - Patient wearing street clothing, shoes to be given pending further stabilization and more time without elopement attempts  - Labs including A1c, lipids, prolactin, tsh wnl  - encourage engagement in milieu  - coordinate dispo with outpt team/housing

## 2021-10-13 PROCEDURE — 99232 SBSQ HOSP IP/OBS MODERATE 35: CPT

## 2021-10-13 RX ORDER — RISPERIDONE 4 MG/1
3 TABLET ORAL
Refills: 0 | Status: DISCONTINUED | OUTPATIENT
Start: 2021-10-13 | End: 2021-10-15

## 2021-10-13 RX ADMIN — Medication 100 MILLIGRAM(S): at 19:45

## 2021-10-13 RX ADMIN — Medication 0.5 MILLIGRAM(S): at 19:45

## 2021-10-13 RX ADMIN — Medication 0.25 MILLIGRAM(S): at 19:45

## 2021-10-13 RX ADMIN — RISPERIDONE 3 MILLIGRAM(S): 4 TABLET ORAL at 19:46

## 2021-10-13 RX ADMIN — Medication 0.25 MILLIGRAM(S): at 08:43

## 2021-10-13 RX ADMIN — RISPERIDONE 3 MILLIGRAM(S): 4 TABLET ORAL at 08:43

## 2021-10-13 RX ADMIN — Medication 0.5 MILLIGRAM(S): at 08:43

## 2021-10-13 NOTE — BH INPATIENT PSYCHIATRY PROGRESS NOTE - CASE SUMMARY
Patient is a 40 year old male who is unemployed, living in [facility] residence with a past psychiatric history of Schizoaffective disorder, following with [facility] OPD, with multiple inpatient hospitalizations secondary to non compliance and also with a history of crack cocaine / Cannabis use disorder with multiple rehab visits and an extensive legal history (currently on parole), who was BIBA for evaluation of agitated bizarre behavior. Patient has been seen and evaluated and currently presents with disorganized behavior, delusion of paranoia, AH and aggressive behavior. Patient is a danger to others and requires involuntary psychiatric hospitalizations (DOCS). Acute decompensation possibly precipitated by substance use, particularly in context of medication nonadherence.     Improving though continues to demonstrate grandiose, persecutory, and paranoid delusions, especially of others being sexually violent/predatory, also somewhat pressured speech, illogical thought process. Generally improved behavioral control, though easily exacerbated by discussion of discharge.     Stabilizing with oral antipsychotics while bridging to Risperdal Consta. Presently refusing mood stabilizers or other medications. Completing trial of clonazepam for manish/psychosis, minimal benefit, tapering. Canceled clonidine order given continued refusal. Refuses guanfacine.     Continued tenuous behavioral control, frequent emergency PRN medication for agitation.   - Risperidone 3mg BID for psychosis        Gave Risperdal Consta 50mg IM 10/12, requires 3wk bridge of risperidone, pt understands and agrees  - Clonazepam 0.25mg BID for manish/paranoia  - discussing lithium for mood, Doxazosin for PTSD  - trazodone 100mg qhs for insomnia (home med)  - Benztropine 0.5mg BID for eps (home med)  PRN Agitated psychosis:      - q6h PO risperidone 1mg ODT + lorazepam 2mg + diphenhydramine 50mg      - q6h IM haloperidol 5mg + lorazepam 2mg + diphenhydramine 50mg  - Monitor for acute aggression potential given presentation and history  - Routine observation  - Patient wearing street clothing, shoes to be given pending further stabilization and more time without elopement attempts  - Labs including A1c, lipids, prolactin, tsh wnl  - encourage engagement in milieu  - coordinate dispo with outpt team/housing

## 2021-10-13 NOTE — BH INPATIENT PSYCHIATRY PROGRESS NOTE - NSBHASSESSSUMMFT_PSY_ALL_CORE
Patient is a 40 year old male who is unemployed, living in [facility] residence with a past psychiatric history of Schizoaffective disorder, following with [facility] OPD, with multiple inpatient hospitalizations secondary to non compliance and also with a history of crack cocaine / Cannabis use disorder with multiple rehab visits and an extensive legal history (currently on parole), who was BIBA for evaluation of agitated bizarre behavior. Patient has been seen and evaluated and currently presents with disorganized behavior, delusion of paranoia, AH and aggressive behavior. Patient is a danger to others and requires involuntary psychiatric hospitalizations (DOCS). Acute decompensation possibly precipitated by substance use, particularly in context of medication nonadherence.     Improving though continues to demonstrate grandiose, persecutory, and paranoid delusions, especially of others being sexually violent/predatory, also somewhat pressured speech, illogical thought process. Generally improved behavioral control, though easily exacerbated by discussion of discharge.     Stabilizing with oral antipsychotics while bridging to Risperdal Consta. Presently refusing mood stabilizers or other medications. Completing trial of clonazepam for manish/psychosis, minimal benefit, tapering. Canceled clonidine order given continued refusal. Refuses guanfacine.     Continued tenuous behavioral control, frequent emergency PRN medication for agitation.   - Risperidone 3mg BID for psychosis        Gave Risperdal Consta 50mg IM 10/12, requires 3wk bridge of risperidone, pt understands and agrees  - Clonazepam 0.25mg BID for manish/paranoia  - discussing lithium for mood, Doxazosin for PTSD  - trazodone 100mg qhs for insomnia (home med)  - Benztropine 0.5mg BID for eps (home med)  PRN Agitated psychosis:      - q6h PO risperidone 1mg ODT + lorazepam 2mg + diphenhydramine 50mg      - q6h IM haloperidol 5mg + lorazepam 2mg + diphenhydramine 50mg  - Monitor for acute aggression potential given presentation and history  - Routine observation  - Patient wearing street clothing, shoes to be given pending further stabilization and more time without elopement attempts  - Labs including A1c, lipids, prolactin, tsh wnl  - encourage engagement in milieu  - coordinate dispo with outpt team/housing   Patient is a 40 year old male who is unemployed, living in [facility] residence with a past psychiatric history of Schizoaffective disorder, following with [facility] OPD, with multiple inpatient hospitalizations secondary to non compliance and also with a history of crack cocaine / Cannabis use disorder with multiple rehab visits and an extensive legal history (currently on parole), who was BIBA for evaluation of agitated bizarre behavior. Patient has been seen and evaluated and currently presents with disorganized behavior, delusion of paranoia, AH and aggressive behavior. Patient is a danger to others and requires involuntary psychiatric hospitalizations (DOCS). Acute decompensation possibly precipitated by substance use, particularly in context of medication nonadherence.     Continues to demonstrate grandiose, persecutory, and paranoid delusions, especially of others being sexually violent/predatory, also somewhat pressured speech, illogical thought process. Generally improved behavioral control, though easily exacerbated by discussion of discharge, increasing aggression as discharge delayed.     Stabilizing partially with oral antipsychotics while bridging to Risperdal Consta given 10/12. Presently refusing mood stabilizers or other medications. Completing trial of clonazepam for manish/psychosis, tapering. Canceled clonidine order given continued refusal. Refuses guanfacine.     Continued tenuous behavioral control    - Risperidone 3mg BID for psychosis, continue until 11/1 as bridge for Risperdal Consta        Gave Risperdal Consta 50mg IM 10/12, requires 3wk bridge of risperidone, pt understands and agrees  - Clonazepam 0.25mg BID for manish/paranoia  - discussing lithium for mood, Doxazosin for PTSD  - discontinued clonidine due to refusal/non-adherence, discussing/encouraging guanfacine  - trazodone 100mg qhs for insomnia (home med)  - Benztropine 0.5mg BID for eps (home med)  PRN Agitated psychosis:      - q6h PO risperidone 1mg ODT + lorazepam 2mg + diphenhydramine 50mg      - q6h IM haloperidol 5mg + lorazepam 2mg + diphenhydramine 50mg  - Monitor for acute aggression potential given presentation and history  - Routine observation  - Patient wearing street clothing, shoes to be given pending further stabilization and more time without elopement attempts  - Labs including A1c, lipids, prolactin, tsh wnl  - encourage engagement in milieu  - coordinate dispo with outpt team/housing

## 2021-10-13 NOTE — BH INPATIENT PSYCHIATRY PROGRESS NOTE - MODIFICATIONS
Attending edited text extensively and directly to avoid confusion, agree with above as it includes all modifications.

## 2021-10-13 NOTE — BH INPATIENT PSYCHIATRY PROGRESS NOTE - NSBHCHARTREVIEWVS_PSY_A_CORE FT
Vital Signs Last 24 Hrs  T(C): 36.3 (10-13-21 @ 08:48), Max: 36.3 (10-12-21 @ 14:14)  T(F): 97.3 (10-13-21 @ 08:48), Max: 97.4 (10-12-21 @ 14:14)  HR: --  BP: --  BP(mean): --  RR: 16 (10-13-21 @ 10:35) (16 - 16)  SpO2: --    Orthostatic VS  10-13-21 @ 08:48  Lying BP: --/-- HR: --  Sitting BP: 111/58 HR: 89  Standing BP: 92/54 HR: 106  Site: --  Mode: --  Orthostatic VS  10-12-21 @ 10:47  Lying BP: --/-- HR: --  Sitting BP: 105/72 HR: 89  Standing BP: --/-- HR: --  Site: --  Mode: --  Orthostatic VS  10-12-21 @ 08:20  Lying BP: --/-- HR: --  Sitting BP: 103/60 HR: 132  Standing BP: 101/60 HR: 112  Site: --  Mode: electronic  Orthostatic VS  10-11-21 @ 19:08  Lying BP: --/-- HR: --  Sitting BP: 106/62 HR: 87  Standing BP: 99/64 HR: 95  Site: --  Mode: --   Vital Signs Last 24 Hrs  T(C): 36.6 (10-13-21 @ 14:09), Max: 36.6 (10-13-21 @ 14:09)  T(F): 97.8 (10-13-21 @ 14:09), Max: 97.8 (10-13-21 @ 14:09)  HR: --  BP: --  BP(mean): --  RR: 16 (10-13-21 @ 10:35) (16 - 16)  SpO2: --    Orthostatic VS  10-13-21 @ 08:48  Lying BP: --/-- HR: --  Sitting BP: 111/58 HR: 89  Standing BP: 92/54 HR: 106  Site: --  Mode: --  Orthostatic VS  10-12-21 @ 10:47  Lying BP: --/-- HR: --  Sitting BP: 105/72 HR: 89  Standing BP: --/-- HR: --  Site: --  Mode: --  Orthostatic VS  10-12-21 @ 08:20  Lying BP: --/-- HR: --  Sitting BP: 103/60 HR: 132  Standing BP: 101/60 HR: 112  Site: --  Mode: electronic  Orthostatic VS  10-11-21 @ 19:08  Lying BP: --/-- HR: --  Sitting BP: 106/62 HR: 87  Standing BP: 99/64 HR: 95  Site: --  Mode: --

## 2021-10-13 NOTE — BH INPATIENT PSYCHIATRY PROGRESS NOTE - NSBHMSESPABN_PSY_A_CORE
Increased latency
Pressured rate/Increased productivity
Increased productivity/Increased latency
Increased latency
Increased productivity
Increased productivity
Pressured rate/Increased productivity/Increased latency
Pressured rate/Increased productivity

## 2021-10-13 NOTE — BH INPATIENT PSYCHIATRY PROGRESS NOTE - CURRENT MEDICATION
MEDICATIONS  (STANDING):  benztropine 0.5 milliGRAM(s) Oral two times a day  clonazePAM Oral Disintegrating Tablet 0.25 milliGRAM(s) Oral two times a day  nicotine - 21 mG/24Hr(s) Patch 1 Patch Transdermal daily  risperiDONE   Tablet 3 milliGRAM(s) Oral two times a day  traZODone 100 milliGRAM(s) Oral at bedtime    MEDICATIONS  (PRN):  diphenhydrAMINE 50 milliGRAM(s) Oral every 6 hours PRN agitation/impulsivity  diphenhydrAMINE   Injectable 50 milliGRAM(s) IntraMuscular every 6 hours PRN Agitation  haloperidol    Injectable 5 milliGRAM(s) IntraMuscular once PRN acute agitation  nicotine  Polacrilex Gum 4 milliGRAM(s) Oral every 3 hours PRN cravings  risperiDONE  Disintegrating Tablet 1 milliGRAM(s) Oral every 6 hours PRN acute agitation/psychosis

## 2021-10-13 NOTE — BH INPATIENT PSYCHIATRY PROGRESS NOTE - NSBHFUPINTERVALHXFT_PSY_A_CORE
Patient is followed up for Schizoaffective Disorder. Patient discussed with nursing staff. Chart, medications and labs reviewed.  Patient remains high risk for aggression, improved though still tenuous behavioral control on the unit. Patient will be allowed to wear street clothing beginning today, but not shoes.    *****    Patient encountered in his room; is groggy but attentive and cooperative; reports good sleep and appetite; reports good mood, motivated to return to residence, irritable when discussing need for delay, including pounding the table and pivoting to talking about paranoid delusions of sexual violence. Patient expresses dissatisfaction with being unable to wear shoes in the unit despite being able to wear street clothing beginning today, complains of foot callouses. Medications discussed with patient, including decrease in dosage of clonazepam following brief trial and IM Risperdal Consta to be given today. Discussed possible trials of lithium or doxazosin, pt refusing both, insistent that he isn't ill, that others have the problem.     Patient continues to demonstrate persecutory delusions with corruption, rape, and childhood as common themes; describes residents and staff at previous residents as being rapists; expresses mistrust of parole officers, whom he finds physically threatening and able to commit crimes with impunity; says "some police officers have never been a child" and thus want to have sex with children in order to experience childhood. Patient claims to have had sex for the first time at age 2 or 3 with a 5-year-old. Patient says he was sentenced to 5 years halfway and 5 years probation for burglary, for which he maintains his innocence; claims "the  was my son," "gave me paperwork saying I'm innocent," and that his aunt discarded this paperwork out a window, forcing him to serve his sentence. Patient is followed up for Schizoaffective Disorder. Patient discussed with nursing staff. Chart, medications and labs reviewed.  Patient remains high risk for aggression, improved though still tenuous behavioral control on the unit. Allowed to wear street clothing though no shoes due to continued elopement risk.     Patient encountered in his room. Awake and superficially cooperative though irritable and at times hostile. Reports good sleep, appetite, mood. Upset about not being discharged tomorrow which he feels he had been promised. Yelling accusations at writer, paranoid that writer was a patient he encountered during a psychiatric hospitalization elsewhere, denies having mental illness, denies needing psychiatric medication (though continues to take risperidone PO), again asserts that police and parole officers as well as people at his residence are all rapists, pedophiles and criminals, that there is not justice except when he does things. Agitated when challenged about these delusions as explanation of why he needs to continue psychiatric hospitalization. Unable to tolerate further discussion of discharge planning. Physically approaching writer while yelling, unable to calm/redirect, prompting termination of interview.

## 2021-10-14 PROCEDURE — 99232 SBSQ HOSP IP/OBS MODERATE 35: CPT

## 2021-10-14 RX ORDER — LITHIUM CARBONATE 300 MG/1
300 TABLET, EXTENDED RELEASE ORAL
Refills: 0 | Status: DISCONTINUED | OUTPATIENT
Start: 2021-10-14 | End: 2021-10-19

## 2021-10-14 RX ORDER — HALOPERIDOL DECANOATE 100 MG/ML
5 INJECTION INTRAMUSCULAR ONCE
Refills: 0 | Status: DISCONTINUED | OUTPATIENT
Start: 2021-10-14 | End: 2021-10-19

## 2021-10-14 RX ORDER — HALOPERIDOL DECANOATE 100 MG/ML
5 INJECTION INTRAMUSCULAR ONCE
Refills: 0 | Status: COMPLETED | OUTPATIENT
Start: 2021-10-14 | End: 2021-10-14

## 2021-10-14 RX ADMIN — RISPERIDONE 1 MILLIGRAM(S): 4 TABLET ORAL at 17:19

## 2021-10-14 RX ADMIN — Medication 0.5 MILLIGRAM(S): at 08:23

## 2021-10-14 RX ADMIN — Medication 0.25 MILLIGRAM(S): at 08:23

## 2021-10-14 RX ADMIN — Medication 50 MILLIGRAM(S): at 17:19

## 2021-10-14 RX ADMIN — RISPERIDONE 3 MILLIGRAM(S): 4 TABLET ORAL at 08:23

## 2021-10-14 RX ADMIN — Medication 0.5 MILLIGRAM(S): at 21:04

## 2021-10-14 RX ADMIN — RISPERIDONE 3 MILLIGRAM(S): 4 TABLET ORAL at 21:03

## 2021-10-14 RX ADMIN — Medication 100 MILLIGRAM(S): at 21:03

## 2021-10-14 RX ADMIN — Medication 0.25 MILLIGRAM(S): at 21:03

## 2021-10-14 NOTE — BH INPATIENT PSYCHIATRY PROGRESS NOTE - NSBHFUPINTERVALHXFT_PSY_A_CORE
Patient is followed up for Schizoaffective Disorder. Patient discussed with nursing staff. Chart, medications and labs reviewed.  Patient remains high risk for aggression, improved though still tenuous behavioral control on the unit. Allowed to wear street clothing though no shoes due to continued elopement risk.    Patient was observed in unit's outdoor area; smiles appropriately, plays basketball cooperatively with another patient, and exhibits; patient's affect dramatically shifts to irritable when asked about mood, with patient continuing to perseverate over being held in unit against his will. Patient reports good sleep and appetite. Patient politely refused to continue interview.

## 2021-10-14 NOTE — BH INPATIENT PSYCHIATRY PROGRESS NOTE - NSBHMSEEYE_PSY_A_CORE
Patient received discharge instructions at 1130 , including medication regimen, new medication instructions, site care, and follow up appointments. No further questions or concerns at this time. Pt left Unit 10s at 1150 via transport in wheelchair to main entrance to meet daughter, who will drive patient home. Patient sent home with all belongings and new scripts. Patient already has scripts at home for new medication. Okay per MD to use since it is the same medication.      Fair

## 2021-10-14 NOTE — BH INPATIENT PSYCHIATRY PROGRESS NOTE - NSBHCHARTREVIEWVS_PSY_A_CORE FT
Vital Signs Last 24 Hrs  T(C): 36.7 (10-13-21 @ 19:51), Max: 37 (10-13-21 @ 15:52)  T(F): 98.1 (10-13-21 @ 19:51), Max: 98.6 (10-13-21 @ 15:52)  HR: --  BP: --  BP(mean): --  RR: 16 (10-13-21 @ 19:51) (16 - 16)  SpO2: --    Orthostatic VS  10-13-21 @ 19:51  Lying BP: --/-- HR: --  Sitting BP: 117/80 HR: 69  Standing BP: 110/74 HR: 65  Site: --  Mode: --  Orthostatic VS  10-13-21 @ 08:48  Lying BP: --/-- HR: --  Sitting BP: 111/58 HR: 89  Standing BP: 92/54 HR: 106  Site: --  Mode: --  Orthostatic VS  10-12-21 @ 10:47  Lying BP: --/-- HR: --  Sitting BP: 105/72 HR: 89  Standing BP: --/-- HR: --  Site: --  Mode: --   Vital Signs Last 24 Hrs  T(C): 36.8 (10-15-21 @ 07:12), Max: 36.8 (10-15-21 @ 07:12)  T(F): 98.2 (10-15-21 @ 07:12), Max: 98.2 (10-15-21 @ 07:12)  HR: --  BP: --  BP(mean): --  RR: 17 (10-14-21 @ 22:02) (17 - 17)  SpO2: --    Orthostatic VS  10-15-21 @ 07:12  Lying BP: --/-- HR: --  Sitting BP: 124/53 HR: 74  Standing BP: --/-- HR: --  Site: --  Mode: --  Orthostatic VS  10-13-21 @ 19:51  Lying BP: --/-- HR: --  Sitting BP: 117/80 HR: 69  Standing BP: 110/74 HR: 65  Site: --  Mode: --

## 2021-10-14 NOTE — BH INPATIENT PSYCHIATRY PROGRESS NOTE - CURRENT MEDICATION
MEDICATIONS  (STANDING):  benztropine 0.5 milliGRAM(s) Oral two times a day  clonazePAM Oral Disintegrating Tablet 0.25 milliGRAM(s) Oral two times a day  lithium SR (LITHOBID) 300 milliGRAM(s) Oral two times a day  nicotine - 21 mG/24Hr(s) Patch 1 Patch Transdermal daily  risperiDONE   Tablet 3 milliGRAM(s) Oral two times a day  traZODone 100 milliGRAM(s) Oral at bedtime    MEDICATIONS  (PRN):  diphenhydrAMINE 50 milliGRAM(s) Oral every 6 hours PRN agitation/impulsivity  diphenhydrAMINE   Injectable 50 milliGRAM(s) IntraMuscular every 6 hours PRN Agitation  haloperidol    Injectable 5 milliGRAM(s) IntraMuscular once PRN acute agitation  nicotine  Polacrilex Gum 4 milliGRAM(s) Oral every 3 hours PRN cravings  risperiDONE  Disintegrating Tablet 1 milliGRAM(s) Oral every 6 hours PRN acute agitation/psychosis

## 2021-10-14 NOTE — BH INPATIENT PSYCHIATRY PROGRESS NOTE - PRN MEDS
MEDICATIONS  (PRN):  diphenhydrAMINE 50 milliGRAM(s) Oral every 6 hours PRN agitation/impulsivity  diphenhydrAMINE   Injectable 50 milliGRAM(s) IntraMuscular every 6 hours PRN Agitation  haloperidol    Injectable 5 milliGRAM(s) IntraMuscular once PRN acute agitation  nicotine  Polacrilex Gum 4 milliGRAM(s) Oral every 3 hours PRN cravings  risperiDONE  Disintegrating Tablet 1 milliGRAM(s) Oral every 6 hours PRN acute agitation/psychosis   MEDICATIONS  (PRN):  diphenhydrAMINE 50 milliGRAM(s) Oral every 6 hours PRN agitation/impulsivity  diphenhydrAMINE   Injectable 50 milliGRAM(s) IntraMuscular every 6 hours PRN Agitation  haloperidol    Injectable 5 milliGRAM(s) IntraMuscular once PRN acute agitation  haloperidol    Injectable 5 milliGRAM(s) IntraMuscular once PRN acute agitation  nicotine  Polacrilex Gum 4 milliGRAM(s) Oral every 3 hours PRN cravings  risperiDONE  Disintegrating Tablet 1 milliGRAM(s) Oral every 6 hours PRN acute agitation/psychosis

## 2021-10-14 NOTE — BH INPATIENT PSYCHIATRY PROGRESS NOTE - NSBHCHARTREVIEWVS_PSY_A_CORE FT
Vital Signs Last 24 Hrs  T(C): 36.7 (10-13-21 @ 19:51), Max: 37 (10-13-21 @ 15:52)  T(F): 98.1 (10-13-21 @ 19:51), Max: 98.6 (10-13-21 @ 15:52)  HR: --  BP: --  BP(mean): --  RR: 16 (10-13-21 @ 19:51) (16 - 16)  SpO2: --    Orthostatic VS  10-13-21 @ 19:51  Lying BP: --/-- HR: --  Sitting BP: 117/80 HR: 69  Standing BP: 110/74 HR: 65  Site: --  Mode: --  Orthostatic VS  10-13-21 @ 08:48  Lying BP: --/-- HR: --  Sitting BP: 111/58 HR: 89  Standing BP: 92/54 HR: 106  Site: --  Mode: --

## 2021-10-14 NOTE — BH INPATIENT PSYCHIATRY PROGRESS NOTE - NSBHASSESSSUMMFT_PSY_ALL_CORE
Patient is a 40 year old male who is unemployed, living in [facility] residence with a past psychiatric history of Schizoaffective disorder, following with [facility] OPD, with multiple inpatient hospitalizations secondary to non compliance and also with a history of crack cocaine / Cannabis use disorder with multiple rehab visits and an extensive legal history (currently on parole), who was BIBA for evaluation of agitated bizarre behavior. Patient has been seen and evaluated and currently presents with disorganized behavior, delusion of paranoia, AH and aggressive behavior. Patient is a danger to others and requires involuntary psychiatric hospitalizations (DOCS). Acute decompensation possibly precipitated by substance use, particularly in context of medication nonadherence.     Continues to demonstrate grandiose, persecutory, and paranoid delusions, especially of others being sexually violent/predatory, also somewhat pressured speech, illogical thought process. Generally improved behavioral control, though easily exacerbated by discussion of discharge, increasing aggression as discharge delayed.     Stabilizing partially with oral antipsychotics while bridging to Risperdal Consta given 10/12. Presently refusing mood stabilizers or other medications. Completing trial of clonazepam for manish/psychosis, tapering. Canceled clonidine order given continued refusal. Refuses guanfacine.     Continued tenuous behavioral control    - Risperidone 3mg BID for psychosis, continue until 11/1 as bridge for Risperdal Consta        Gave Risperdal Consta 50mg IM 10/12, requires 3wk bridge of risperidone, pt understands and agrees  - Clonazepam 0.25mg BID for manish/paranoia  - discussing lithium for mood, Doxazosin for PTSD  - discontinued clonidine due to refusal/non-adherence, discussing/encouraging guanfacine  - trazodone 100mg qhs for insomnia (home med)  - Benztropine 0.5mg BID for eps (home med)  PRN Agitated psychosis:      - q6h PO risperidone 1mg ODT + lorazepam 2mg + diphenhydramine 50mg      - q6h IM haloperidol 5mg + lorazepam 2mg + diphenhydramine 50mg  - Monitor for acute aggression potential given presentation and history  - Routine observation  - Patient wearing street clothing, shoes to be given pending further stabilization and more time without elopement attempts  - Labs including A1c, lipids, prolactin, tsh wnl  - encourage engagement in milieu  - coordinate dispo with outpt team/housing

## 2021-10-14 NOTE — BH INPATIENT PSYCHIATRY PROGRESS NOTE - NSBHASSESSSUMMFT_PSY_ALL_CORE
Patient is a 40 year old male who is unemployed, living in [facility] residence with a past psychiatric history of Schizoaffective disorder, following with [facility] OPD, with multiple inpatient hospitalizations secondary to non compliance and also with a history of crack cocaine / Cannabis use disorder with multiple rehab visits and an extensive legal history (currently on parole), who was BIBA for evaluation of agitated bizarre behavior. Patient has been seen and evaluated and currently presents with disorganized behavior, delusion of paranoia, AH and aggressive behavior. Patient is a danger to others and requires involuntary psychiatric hospitalizations (DOCS). Acute decompensation possibly precipitated by substance use, particularly in context of medication nonadherence.     Continues to demonstrate grandiose, persecutory, and paranoid delusions, especially of others being sexually violent/predatory, also somewhat pressured speech, illogical thought process. Generally improved behavioral control, though easily exacerbated by discussion of discharge, increasing aggression as discharge delayed.     Stabilizing partially with oral antipsychotics while bridging to Risperdal Consta given 10/12. Presently refusing mood stabilizers or other medications. Completing trial of clonazepam for manish/psychosis, tapering. Canceled clonidine order given continued refusal. Refuses guanfacine.     Continued tenuous behavioral control    - Risperidone 3mg BID for psychosis, continue until 11/1 as bridge for Risperdal Consta        Gave Risperdal Consta 50mg IM 10/12, requires 3wk bridge of risperidone, pt understands and agrees  - Clonazepam 0.25mg BID for manish/paranoia  - discussing lithium for mood, Doxazosin for PTSD  - discontinued clonidine due to refusal/non-adherence, discussing/encouraging guanfacine  - trazodone 100mg qhs for insomnia (home med)  - Benztropine 0.5mg BID for eps (home med)  PRN Agitated psychosis:      - q6h PO risperidone 1mg ODT + lorazepam 2mg + diphenhydramine 50mg      - q6h IM haloperidol 5mg + lorazepam 2mg + diphenhydramine 50mg  - Monitor for acute aggression potential given presentation and history  - Routine observation  - Patient wearing street clothing, shoes to be given pending further stabilization and more time without elopement attempts  - Labs including A1c, lipids, prolactin, tsh wnl  - encourage engagement in milieu  - coordinate dispo with outpt team/housing Patient is a 40 year old male who is unemployed, living in [facility] residence with a past psychiatric history of Schizoaffective disorder, following with [facility] OPD, with multiple inpatient hospitalizations secondary to non compliance and also with a history of crack cocaine / Cannabis use disorder with multiple rehab visits and an extensive legal history (currently on parole), who was BIBA for evaluation of agitated bizarre behavior. Patient has been seen and evaluated and currently presents with disorganized behavior, delusion of paranoia, AH and aggressive behavior. Patient is a danger to others and requires involuntary psychiatric hospitalizations (DOCS). Acute decompensation possibly precipitated by substance use, particularly in context of medication nonadherence.     Continues to demonstrate grandiose, persecutory, and paranoid delusions, especially of others being sexually violent/predatory, also somewhat pressured speech, illogical thought process. Generally improved behavioral control, though easily exacerbated by discussion of discharge, increasing aggression as discharge delayed.     Stabilizing partially with oral antipsychotics while bridging to Risperdal Consta given 10/12. Presently refusing mood stabilizers or other medications. Completing trial of clonazepam for manish/psychosis, tapering. Canceled clonidine order given continued refusal. Discussed lithium, offering.    Continued tenuous behavioral control    - Risperidone 3mg BID for psychosis, continue until 11/1 as bridge for Risperdal Consta        Gave Risperdal Consta 50mg IM 10/12, requires 3wk bridge of risperidone, pt understands and agrees  - Starting lithium 300mg BID for mood  - Clonazepam 0.25mg BID for manish/paranoia  - discontinued clonidine due to refusal/non-adherence, discussing/encouraging guanfacine  - trazodone 100mg qhs for insomnia (home med)  - Benztropine 0.5mg BID for eps (home med)  PRN Agitated psychosis:      - q6h PO risperidone 1mg ODT + lorazepam 2mg + diphenhydramine 50mg      - q6h IM haloperidol 5mg + lorazepam 2mg + diphenhydramine 50mg  - Cannabis and cocaine substance use--continuing to discuss, pt precontemplative, refusing additional engagement/treatment  - Nicotine replacement for nicotine dependence  - Monitor for acute aggression potential given presentation and history  - Routine observation  - Patient wearing street clothing, shoes to be given pending further stabilization and more time without elopement attempts  - Labs including A1c, lipids, prolactin, tsh wnl  - encourage engagement in milieu  - coordinate dispo with outpt team/housing

## 2021-10-14 NOTE — BH INPATIENT PSYCHIATRY PROGRESS NOTE - NSBHFUPINTERVALHXFT_PSY_A_CORE
Patient is followed up for Schizoaffective Disorder. Patient discussed with nursing staff. Chart, medications and labs reviewed.      Patient remains high risk for aggression, improved though still tenuous behavioral control on the unit. Allowed to wear street clothing though no shoes due to continued elopement risk.     *****    Patient encountered in his room. Awake and superficially cooperative though irritable and at times hostile. Reports good sleep, appetite, mood. Upset about not being discharged tomorrow which he feels he had been promised. Yelling accusations at writer, paranoid that writer was a patient he encountered during a psychiatric hospitalization elsewhere, denies having mental illness, denies needing psychiatric medication (though continues to take risperidone PO), again asserts that police and parole officers as well as people at his residence are all rapists, pedophiles and criminals, that there is not justice except when he does things. Agitated when challenged about these delusions as explanation of why he needs to continue psychiatric hospitalization. Unable to tolerate further discussion of discharge planning. Physically approaching writer while yelling, unable to calm/redirect, prompting termination of interview. Patient is followed up for Schizoaffective Disorder. Patient discussed with nursing staff. Chart, medications and labs reviewed.  Patient remains high risk for aggression, improved though still tenuous behavioral control on the unit. Allowed to wear street clothing though no shoes due to continued elopement risk.     Patient had been significantly upset/agitated towards writer for not being discharged yet, feeling he had been lied to. He called the writer's office by calling through the hospital  multiple times, upset when writer did not agree he should be discharged.     Met with pt with PES present due to agitation and history of rapid escalation to aggression. Pt was able to maintain behavioral control in presence of PES though had difficulty maintaining affect regulation/remaining calm.     Continued delusions re police, parole officers, staff and other residents at residence being rapists, pedophiles, or criminals, and that he has not been appropriately convicted, only by fake judges for things he didn't do, rather done by body double. He asserts he previously encountered the writer insisting my arm was broken and I was in another hospital he had been admitted to, unable to be persuaded otherwise.     Discussed lithium for mood regulation again, pt expressing he does not believe he needs it or does not intend to take it though understands writer is ordering it. Agrees to continue taking risperidone, already received risperidone RANKIN, thinks this should be enough.     Agitated when challenged about these delusions as explanation of why he needs to continue psychiatric hospitalization. Understands that he needs to continue maintaining behavioral control to go home. Denies AH, SI/HI.

## 2021-10-14 NOTE — BH INPATIENT PSYCHIATRY PROGRESS NOTE - CURRENT MEDICATION
MEDICATIONS  (STANDING):  benztropine 0.5 milliGRAM(s) Oral two times a day  clonazePAM Oral Disintegrating Tablet 0.25 milliGRAM(s) Oral two times a day  nicotine - 21 mG/24Hr(s) Patch 1 Patch Transdermal daily  risperiDONE   Tablet 3 milliGRAM(s) Oral two times a day  traZODone 100 milliGRAM(s) Oral at bedtime    MEDICATIONS  (PRN):  diphenhydrAMINE 50 milliGRAM(s) Oral every 6 hours PRN agitation/impulsivity  diphenhydrAMINE   Injectable 50 milliGRAM(s) IntraMuscular every 6 hours PRN Agitation  haloperidol    Injectable 5 milliGRAM(s) IntraMuscular once PRN acute agitation  nicotine  Polacrilex Gum 4 milliGRAM(s) Oral every 3 hours PRN cravings  risperiDONE  Disintegrating Tablet 1 milliGRAM(s) Oral every 6 hours PRN acute agitation/psychosis   MEDICATIONS  (STANDING):  benztropine 0.5 milliGRAM(s) Oral two times a day  clonazePAM Oral Disintegrating Tablet 0.25 milliGRAM(s) Oral two times a day  lithium SR (LITHOBID) 300 milliGRAM(s) Oral two times a day  nicotine - 21 mG/24Hr(s) Patch 1 Patch Transdermal daily  risperiDONE   Tablet 3 milliGRAM(s) Oral two times a day  traZODone 100 milliGRAM(s) Oral at bedtime    MEDICATIONS  (PRN):  diphenhydrAMINE 50 milliGRAM(s) Oral every 6 hours PRN agitation/impulsivity  diphenhydrAMINE   Injectable 50 milliGRAM(s) IntraMuscular every 6 hours PRN Agitation  haloperidol    Injectable 5 milliGRAM(s) IntraMuscular once PRN acute agitation  haloperidol    Injectable 5 milliGRAM(s) IntraMuscular once PRN acute agitation  nicotine  Polacrilex Gum 4 milliGRAM(s) Oral every 3 hours PRN cravings  risperiDONE  Disintegrating Tablet 1 milliGRAM(s) Oral every 6 hours PRN acute agitation/psychosis

## 2021-10-15 LAB — SARS-COV-2 RNA SPEC QL NAA+PROBE: SIGNIFICANT CHANGE UP

## 2021-10-15 PROCEDURE — 99232 SBSQ HOSP IP/OBS MODERATE 35: CPT

## 2021-10-15 RX ORDER — RISPERIDONE 4 MG/1
3 TABLET ORAL
Refills: 0 | Status: DISCONTINUED | OUTPATIENT
Start: 2021-10-15 | End: 2021-10-19

## 2021-10-15 RX ADMIN — Medication 0.5 MILLIGRAM(S): at 20:12

## 2021-10-15 RX ADMIN — RISPERIDONE 3 MILLIGRAM(S): 4 TABLET ORAL at 08:37

## 2021-10-15 RX ADMIN — Medication 0.25 MILLIGRAM(S): at 08:37

## 2021-10-15 RX ADMIN — Medication 100 MILLIGRAM(S): at 20:12

## 2021-10-15 RX ADMIN — Medication 0.5 MILLIGRAM(S): at 08:37

## 2021-10-15 RX ADMIN — RISPERIDONE 3 MILLIGRAM(S): 4 TABLET ORAL at 20:12

## 2021-10-15 RX ADMIN — Medication 4 MILLIGRAM(S): at 10:40

## 2021-10-15 RX ADMIN — Medication 1 PATCH: at 08:38

## 2021-10-15 RX ADMIN — Medication 0.25 MILLIGRAM(S): at 20:12
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
## 2021-10-15 NOTE — BH INPATIENT PSYCHIATRY PROGRESS NOTE - CURRENT MEDICATION
MEDICATIONS  (STANDING):  benztropine 0.5 milliGRAM(s) Oral two times a day  clonazePAM Oral Disintegrating Tablet 0.25 milliGRAM(s) Oral two times a day  lithium SR (LITHOBID) 300 milliGRAM(s) Oral two times a day  nicotine - 21 mG/24Hr(s) Patch 1 Patch Transdermal daily  risperiDONE  Disintegrating Tablet 3 milliGRAM(s) Oral two times a day  traZODone 100 milliGRAM(s) Oral at bedtime    MEDICATIONS  (PRN):  diphenhydrAMINE 50 milliGRAM(s) Oral every 6 hours PRN agitation/impulsivity  diphenhydrAMINE   Injectable 50 milliGRAM(s) IntraMuscular every 6 hours PRN Agitation  haloperidol    Injectable 5 milliGRAM(s) IntraMuscular once PRN acute agitation  haloperidol    Injectable 5 milliGRAM(s) IntraMuscular once PRN acute agitation  nicotine  Polacrilex Gum 4 milliGRAM(s) Oral every 3 hours PRN cravings  risperiDONE  Disintegrating Tablet 1 milliGRAM(s) Oral every 6 hours PRN acute agitation/psychosis

## 2021-10-15 NOTE — BH INPATIENT PSYCHIATRY PROGRESS NOTE - PRN MEDS
MEDICATIONS  (PRN):  diphenhydrAMINE 50 milliGRAM(s) Oral every 6 hours PRN agitation/impulsivity  diphenhydrAMINE   Injectable 50 milliGRAM(s) IntraMuscular every 6 hours PRN Agitation  haloperidol    Injectable 5 milliGRAM(s) IntraMuscular once PRN acute agitation  haloperidol    Injectable 5 milliGRAM(s) IntraMuscular once PRN acute agitation  nicotine  Polacrilex Gum 4 milliGRAM(s) Oral every 3 hours PRN cravings  risperiDONE  Disintegrating Tablet 1 milliGRAM(s) Oral every 6 hours PRN acute agitation/psychosis

## 2021-10-15 NOTE — BH INPATIENT PSYCHIATRY PROGRESS NOTE - NSBHASSESSSUMMFT_PSY_ALL_CORE
Patient is a 40 year old male who is unemployed, living in [facility] residence with a past psychiatric history of Schizoaffective disorder, following with [facility] OPD, with multiple inpatient hospitalizations secondary to non compliance and also with a history of crack cocaine / Cannabis use disorder with multiple rehab visits and an extensive legal history (currently on parole), who was BIBA for evaluation of agitated bizarre behavior. Patient has been seen and evaluated and currently presents with disorganized behavior, delusion of paranoia, AH and aggressive behavior. Patient is a danger to others and requires involuntary psychiatric hospitalizations (DOCS). Acute decompensation possibly precipitated by substance use, particularly in context of medication nonadherence.     Continues to demonstrate grandiose, persecutory, and paranoid delusions, especially of others being sexually violent/predatory, also somewhat pressured speech, illogical thought process. Generally improved behavioral control, though easily exacerbated by discussion of discharge, increasing aggression as discharge delayed.     Stabilizing partially with oral antipsychotics while bridging to Risperdal Consta given 10/12.     Completing trial of clonazepam for manish/psychosis, tapering. Reordering clonidine for aggression/impulsivity though presently refusing. Discussed lithium, ordered to stabilize mood though pt refusing.     Switched risperidone tabs to ODT    Continued tenuous behavioral control. Will need covid-19 quarantine prior to discharge. Working on ACT/AOT referral.    - Risperidone 3mg BID for psychosis, continue until 11/1 as bridge for Risperdal Consta        Gave Risperdal Consta 50mg IM 10/12, requires 3wk bridge of risperidone, pt understands and agrees  - Clonazepam 0.25mg BID for manish/paranoia  - Ordered though refusing: lithium 300mg BID for mood  - Ordered though refusing: clonidine 0.1mg BID for aggression/impulsivity  - trazodone 100mg qhs for insomnia (home med)  - Benztropine 0.5mg BID for eps (home med)  PRN Agitated psychosis:      - q6h PO risperidone 1mg ODT + lorazepam 2mg + diphenhydramine 50mg      - q6h IM haloperidol 5mg + lorazepam 2mg + diphenhydramine 50mg  - Cannabis and cocaine substance use--continuing to discuss, pt precontemplative, refusing additional engagement/treatment  - Nicotine replacement for nicotine dependence  - Monitor for acute aggression potential given presentation and history  - Routine observation  - Patient wearing street clothing, shoes to be given pending further stabilization and more time without elopement attempts  - Labs including A1c, lipids, prolactin, tsh wnl  - encourage engagement in milieu  - coordinate dispo with outpt team/housing

## 2021-10-15 NOTE — BH INPATIENT PSYCHIATRY PROGRESS NOTE - NSBHCHARTREVIEWVS_PSY_A_CORE FT
Vital Signs Last 24 Hrs  T(C): 36.1 (10-15-21 @ 14:06), Max: 36.8 (10-15-21 @ 07:12)  T(F): 97 (10-15-21 @ 14:06), Max: 98.2 (10-15-21 @ 07:12)  HR: --  BP: --  BP(mean): --  RR: 17 (10-14-21 @ 22:02) (17 - 17)  SpO2: --    Orthostatic VS  10-15-21 @ 07:12  Lying BP: --/-- HR: --  Sitting BP: 124/53 HR: 74  Standing BP: --/-- HR: --  Site: --  Mode: --  Orthostatic VS  10-13-21 @ 19:51  Lying BP: --/-- HR: --  Sitting BP: 117/80 HR: 69  Standing BP: 110/74 HR: 65  Site: --  Mode: --

## 2021-10-16 PROCEDURE — 99232 SBSQ HOSP IP/OBS MODERATE 35: CPT

## 2021-10-16 RX ADMIN — Medication 0.5 MILLIGRAM(S): at 19:36

## 2021-10-16 RX ADMIN — RISPERIDONE 3 MILLIGRAM(S): 4 TABLET ORAL at 19:37

## 2021-10-16 RX ADMIN — Medication 0.5 MILLIGRAM(S): at 08:36

## 2021-10-16 RX ADMIN — Medication 0.25 MILLIGRAM(S): at 08:36

## 2021-10-16 RX ADMIN — RISPERIDONE 3 MILLIGRAM(S): 4 TABLET ORAL at 08:38

## 2021-10-16 RX ADMIN — Medication 0.25 MILLIGRAM(S): at 19:36

## 2021-10-16 RX ADMIN — Medication 1 PATCH: at 07:30

## 2021-10-16 RX ADMIN — Medication 100 MILLIGRAM(S): at 19:37

## 2021-10-16 RX ADMIN — Medication 1 PATCH: at 08:38

## 2021-10-16 RX ADMIN — Medication 50 MILLIGRAM(S): at 19:37

## 2021-10-16 RX ADMIN — Medication 1 PATCH: at 19:00

## 2021-10-16 NOTE — BH INPATIENT PSYCHIATRY PROGRESS NOTE - NSBHCHARTREVIEWVS_PSY_A_CORE FT
Vital Signs Last 24 Hrs  T(C): 36.8 (10-16-21 @ 05:53), Max: 37.2 (10-15-21 @ 22:35)  T(F): 98.3 (10-16-21 @ 05:53), Max: 98.9 (10-15-21 @ 22:35)  HR: --  BP: --  BP(mean): --  RR: 18 (10-15-21 @ 21:13) (18 - 18)  SpO2: 98% (10-15-21 @ 21:13) (98% - 98%)    Orthostatic VS  10-15-21 @ 19:01  Lying BP: --/-- HR: --  Sitting BP: 105/80 HR: 97  Standing BP: 90/64 HR: 109  Site: --  Mode: --  Orthostatic VS  10-15-21 @ 07:12  Lying BP: --/-- HR: --  Sitting BP: 124/53 HR: 74  Standing BP: --/-- HR: --  Site: --  Mode: --   Vital Signs Last 24 Hrs  T(C): 36.8 (10-16-21 @ 05:53), Max: 37.2 (10-15-21 @ 22:35)  T(F): 98.3 (10-16-21 @ 05:53), Max: 98.9 (10-15-21 @ 22:35)  HR: --  BP: --  BP(mean): --  RR: 18 (10-15-21 @ 21:13) (18 - 18)  SpO2: 98% (10-15-21 @ 21:13) (98% - 98%)    Orthostatic VS  10-16-21 @ 07:54  Lying BP: --/-- HR: --  Sitting BP: 95/66 HR: 77  Standing BP: 79/59 HR: 100  Site: --  Mode: electronic  Orthostatic VS  10-15-21 @ 19:01  Lying BP: --/-- HR: --  Sitting BP: 105/80 HR: 97  Standing BP: 90/64 HR: 109  Site: --  Mode: --  Orthostatic VS  10-15-21 @ 07:12  Lying BP: --/-- HR: --  Sitting BP: 124/53 HR: 74  Standing BP: --/-- HR: --  Site: --  Mode: --

## 2021-10-16 NOTE — BH INPATIENT PSYCHIATRY PROGRESS NOTE - CURRENT MEDICATION
MEDICATIONS  (STANDING):  benztropine 0.5 milliGRAM(s) Oral two times a day  clonazePAM Oral Disintegrating Tablet 0.25 milliGRAM(s) Oral two times a day  cloNIDine 0.1 milliGRAM(s) Oral two times a day  lithium SR (LITHOBID) 300 milliGRAM(s) Oral two times a day  nicotine - 21 mG/24Hr(s) Patch 1 Patch Transdermal daily  risperiDONE  Disintegrating Tablet 3 milliGRAM(s) Oral two times a day  traZODone 100 milliGRAM(s) Oral at bedtime    MEDICATIONS  (PRN):  diphenhydrAMINE 50 milliGRAM(s) Oral every 6 hours PRN agitation/impulsivity  diphenhydrAMINE   Injectable 50 milliGRAM(s) IntraMuscular every 6 hours PRN Agitation  haloperidol    Injectable 5 milliGRAM(s) IntraMuscular once PRN acute agitation  haloperidol    Injectable 5 milliGRAM(s) IntraMuscular once PRN acute agitation  nicotine  Polacrilex Gum 4 milliGRAM(s) Oral every 3 hours PRN cravings  risperiDONE  Disintegrating Tablet 1 milliGRAM(s) Oral every 6 hours PRN acute agitation/psychosis

## 2021-10-16 NOTE — BH INPATIENT PSYCHIATRY PROGRESS NOTE - NSBHFUPINTERVALHXFT_PSY_A_CORE
Patient is followed up for psychosis. Chart, medications and labs reviewed.  Patient is discussed with nursing staff. No significant overnight issues.  Patient recently switched to M-tab medications. He took  partial medications last evening, compliant with: Risperdal, Klonopin, Cogentin and Trazodoane,  noncompliant with Lithium and Clonidine. Patient has remained in fair-good behavioral control no prns needed for aggression.   Patient is observed pacing in his room with headphones, minimally engaging, reports “ok” mood.  States “I took the meds.” Patient is irritable during interview.  He denies any SI/SIB/HI, no plan or intent, engages in safety planning.  Denies AVH, delusions. Denies negative/violent thoughts. He is discharged focused.  Patient still a disorganized.  No acute medical concerns, VSS.  Will continue to provide therapeutic support. Continue treatment for risk modification. Pt had confirmed covid-19 exposure from peer on unit, pt initially refusing to be retested, until yesterday afternoon, Covid PCR resulted negative.

## 2021-10-16 NOTE — BH INPATIENT PSYCHIATRY PROGRESS NOTE - NSBHASSESSSUMMFT_PSY_ALL_CORE
Patient is a 40 year old male who is unemployed, living in [facility] residence with a past psychiatric history of Schizoaffective disorder, following with [facility] OPD, with multiple inpatient hospitalizations secondary to non compliance and also with a history of crack cocaine / Cannabis use disorder with multiple rehab visits and an extensive legal history (currently on parole), who was BIBA for evaluation of agitated bizarre behavior. Patient has been seen and evaluated and currently presents with disorganized behavior, delusion of paranoia, AH and aggressive behavior. Patient is a danger to others and requires involuntary psychiatric hospitalizations (DOCS). Acute decompensation possibly precipitated by substance use, particularly in context of medication nonadherence.     Continues to demonstrate grandiose, persecutory, and paranoid delusions, especially of others being sexually violent/predatory, also somewhat pressured speech, illogical thought process. Generally improved behavioral control, though easily exacerbated by discussion of discharge, increasing aggression as discharge delayed.     Stabilizing partially with oral antipsychotics while bridging to Risperdal Consta given 10/12.     Completing trial of clonazepam for manish/psychosis, tapering. Reordering clonidine for aggression/impulsivity though presently refusing. Discussed lithium, ordered to stabilize mood though pt refusing.     Switched risperidone tabs to ODT    Continued tenuous behavioral control. Will need covid-19 quarantine prior to discharge. Working on ACT/AOT referral.    - Risperidone 3mg BID for psychosis, continue until 11/1 as bridge for Risperdal Consta        Gave Risperdal Consta 50mg IM 10/12, requires 3wk bridge of risperidone, pt understands and agrees  - Clonazepam 0.25mg BID for manish/paranoia  - Ordered though refusing: lithium 300mg BID for mood  - Ordered though refusing: clonidine 0.1mg BID for aggression/impulsivity  - trazodone 100mg qhs for insomnia (home med)  - Benztropine 0.5mg BID for eps (home med)  PRN Agitated psychosis:      - q6h PO risperidone 1mg ODT + lorazepam 2mg + diphenhydramine 50mg      - q6h IM haloperidol 5mg + lorazepam 2mg + diphenhydramine 50mg  - Cannabis and cocaine substance use--continuing to discuss, pt precontemplative, refusing additional engagement/treatment  - Nicotine replacement for nicotine dependence  - Monitor for acute aggression potential given presentation and history  - Routine observation  - Patient wearing street clothing, shoes to be given pending further stabilization and more time without elopement attempts  - Labs including A1c, lipids, prolactin, tsh wnl  - encourage engagement in milieu  - coordinate dispo with outpt team/housing

## 2021-10-17 PROCEDURE — 99232 SBSQ HOSP IP/OBS MODERATE 35: CPT

## 2021-10-17 RX ADMIN — Medication 0.25 MILLIGRAM(S): at 20:03

## 2021-10-17 RX ADMIN — RISPERIDONE 3 MILLIGRAM(S): 4 TABLET ORAL at 08:36

## 2021-10-17 RX ADMIN — Medication 0.5 MILLIGRAM(S): at 20:03

## 2021-10-17 RX ADMIN — RISPERIDONE 3 MILLIGRAM(S): 4 TABLET ORAL at 20:02

## 2021-10-17 RX ADMIN — Medication 1 PATCH: at 07:41

## 2021-10-17 RX ADMIN — Medication 1 PATCH: at 08:36

## 2021-10-17 RX ADMIN — Medication 0.25 MILLIGRAM(S): at 08:36

## 2021-10-17 RX ADMIN — Medication 1 PATCH: at 08:35

## 2021-10-17 RX ADMIN — Medication 100 MILLIGRAM(S): at 20:03

## 2021-10-17 RX ADMIN — Medication 0.5 MILLIGRAM(S): at 08:36

## 2021-10-17 NOTE — BH INPATIENT PSYCHIATRY PROGRESS NOTE - NSBHFUPINTERVALHXFT_PSY_A_CORE
Patient is followed up for psychosis. Chart, medications and labs reviewed.  Patient is discussed with nursing staff. No significant overnight issues.  Per nursing patient is observed talking to himself, laughing inappropriately.  Nursing reports last evening patient was heard shouting/cursing in his room. Pt took partial medications last evening, compliant with: Risperdal, Klonopin, Cogentin and Trazodoane,  noncompliant with Lithium and Clonidine. Patient has remained in fair-good behavioral control no prns needed for aggression.   Patient is observed pacing in his room with headphones, minimally engaging, very  irritable.  He denies any SI/SIB/HI, no plan or intent, engages in safety planning.  Denies AVH, delusions. Denies negative/violent thoughts. He is discharged focused, and disorganized.  No acute medical concerns, VSS.  Will continue to provide therapeutic support. Continue treatment for risk modification. Pt had confirmed covid-19 exposure from peer on unit, Covid PCR resulted negative.

## 2021-10-17 NOTE — BH INPATIENT PSYCHIATRY PROGRESS NOTE - NSBHMETABOLIC_PSY_ALL_CORE_FT
BMI: BMI (kg/m2): 28.3 (10-02-21 @ 15:39)  HbA1c: A1C with Estimated Average Glucose Result: 5.1 % (10-08-21 @ 10:19)    Glucose:   BP: 103/75 (10-16-21 @ 15:48) (103/75 - 103/75)  Lipid Panel: Date/Time: 10-08-21 @ 10:17  Cholesterol, Serum: 171  Direct LDL: --  HDL Cholesterol, Serum: 50  Total Cholesterol/HDL Ration Measurement: --  Triglycerides, Serum: 112

## 2021-10-17 NOTE — BH INPATIENT PSYCHIATRY PROGRESS NOTE - NSBHASSESSSUMMFT_PSY_ALL_CORE
Patient is a 40 year old male who is unemployed, living in [facility] residence with a past psychiatric history of Schizoaffective disorder, following with [facility] OPD, with multiple inpatient hospitalizations secondary to non compliance and also with a history of crack cocaine / Cannabis use disorder with multiple rehab visits and an extensive legal history (currently on parole), who was BIBA for evaluation of agitated bizarre behavior. Patient has been seen and evaluated and currently presents with disorganized behavior, delusion of paranoia, AH and aggressive behavior. Patient is a danger to others and requires involuntary psychiatric hospitalizations (DOCS). Acute decompensation possibly precipitated by substance use, particularly in context of medication nonadherence.     Continues to demonstrate grandiose, persecutory, and paranoid delusions, especially of others being sexually violent/predatory, also somewhat pressured speech, illogical thought process. Generally improved behavioral control, though easily exacerbated by discussion of discharge, increasing aggression as discharge delayed.     Stabilizing partially with oral antipsychotics while bridging to Risperdal Consta given 10/12.     Completing trial of clonazepam for manish/psychosis, tapering. Reordering clonidine for aggression/impulsivity though presently refusing. Discussed lithium, ordered to stabilize mood though pt refusing.     Switched risperidone tabs to ODT    Continued tenuous behavioral control. Will need covid-19 quarantine prior to discharge. Working on ACT/AOT referral.    - Risperidone 3mg BID for psychosis, continue until 11/1 as bridge for Risperdal Consta        Gave Risperdal Consta 50mg IM 10/12, requires 3wk bridge of risperidone, pt understands and agrees  - Clonazepam 0.25mg BID for manish/paranoia  - Ordered though refusing: lithium 300mg BID for mood  - Ordered though refusing: clonidine 0.1mg BID for aggression/impulsivity  - trazodone 100mg qhs for insomnia (home med)  - Benztropine 0.5mg BID for eps (home med)  PRN Agitated psychosis:      - q6h PO risperidone 1mg ODT + lorazepam 2mg + diphenhydramine 50mg      - q6h IM haloperidol 5mg + lorazepam 2mg + diphenhydramine 50mg  - Cannabis and cocaine substance use--continuing to discuss, pt precontemplative, refusing additional engagement/treatment  - Nicotine replacement for nicotine dependence  - Monitor for acute aggression potential given presentation and history  - Routine observation  - Patient wearing street clothing, shoes to be given pending further stabilization and more time without elopement attempts  - Labs including A1c, lipids, prolactin, tsh wnl  - encourage engagement in milieu  - coordinate dispo with outpt team/housing

## 2021-10-17 NOTE — BH INPATIENT PSYCHIATRY PROGRESS NOTE - NSBHCHARTREVIEWVS_PSY_A_CORE FT
Vital Signs Last 24 Hrs  T(C): 37.1 (10-16-21 @ 19:06), Max: 37.1 (10-16-21 @ 19:06)  T(F): 98.7 (10-16-21 @ 19:06), Max: 98.7 (10-16-21 @ 19:06)  HR: 78 (10-16-21 @ 15:48) (78 - 78)  BP: 103/75 (10-16-21 @ 15:48) (103/75 - 103/75)  BP(mean): --  RR: --  SpO2: --    Orthostatic VS  10-16-21 @ 19:06  Lying BP: --/-- HR: --  Sitting BP: 113/68 HR: 84  Standing BP: 102/84 HR: 76  Site: --  Mode: --  Orthostatic VS  10-16-21 @ 07:54  Lying BP: --/-- HR: --  Sitting BP: 95/66 HR: 77  Standing BP: 79/59 HR: 100  Site: --  Mode: electronic  Orthostatic VS  10-15-21 @ 19:01  Lying BP: --/-- HR: --  Sitting BP: 105/80 HR: 97  Standing BP: 90/64 HR: 109  Site: --  Mode: --

## 2021-10-18 PROCEDURE — 99232 SBSQ HOSP IP/OBS MODERATE 35: CPT

## 2021-10-18 RX ORDER — CLONAZEPAM 1 MG
0.12 TABLET ORAL
Refills: 0 | Status: DISCONTINUED | OUTPATIENT
Start: 2021-10-18 | End: 2021-10-19

## 2021-10-18 RX ADMIN — Medication 0.5 MILLIGRAM(S): at 20:14

## 2021-10-18 RX ADMIN — RISPERIDONE 3 MILLIGRAM(S): 4 TABLET ORAL at 20:15

## 2021-10-18 RX ADMIN — Medication 100 MILLIGRAM(S): at 20:14

## 2021-10-18 RX ADMIN — Medication 0.5 MILLIGRAM(S): at 08:27

## 2021-10-18 RX ADMIN — Medication 0.12 MILLIGRAM(S): at 20:15

## 2021-10-18 RX ADMIN — RISPERIDONE 3 MILLIGRAM(S): 4 TABLET ORAL at 08:27

## 2021-10-18 RX ADMIN — LITHIUM CARBONATE 300 MILLIGRAM(S): 300 TABLET, EXTENDED RELEASE ORAL at 20:15

## 2021-10-18 RX ADMIN — Medication 0.25 MILLIGRAM(S): at 08:28
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
## 2021-10-18 NOTE — BH INPATIENT PSYCHIATRY PROGRESS NOTE - NSBHASSESSSUMMFT_PSY_ALL_CORE
Patient is a 40 year old male who is unemployed, living in [facility] residence with a past psychiatric history of Schizoaffective disorder, following with [facility] OPD, with multiple inpatient hospitalizations secondary to non compliance and also with a history of crack cocaine / Cannabis use disorder with multiple rehab visits and an extensive legal history (currently on parole), who was BIBA for evaluation of agitated bizarre behavior. Patient has been seen and evaluated and currently presents with disorganized behavior, delusion of paranoia, AH and aggressive behavior. Patient is a danger to others and requires involuntary psychiatric hospitalizations (DOCS). Acute decompensation possibly precipitated by substance use, particularly in context of medication nonadherence.     Continues to demonstrate grandiose, persecutory, and paranoid delusions, especially of others being sexually violent/predatory, also somewhat pressured speech, illogical thought process. Generally improved behavioral control, though easily exacerbated by discussion of discharge, increasing aggression as discharge delayed.     Stabilizing partially with oral antipsychotics while bridging to Risperdal Consta given 10/12.     Completing trial of clonazepam for manish/psychosis, tapering. Reordering clonidine for aggression/impulsivity though presently refusing. Discussed lithium, ordered to stabilize mood though pt refusing.     Switched risperidone tabs to ODT    Continued tenuous behavioral control. Will need covid-19 quarantine prior to discharge. Working on ACT/AOT referral.    - Risperidone 3mg BID for psychosis, continue until 11/1 as bridge for Risperdal Consta        Gave Risperdal Consta 50mg IM 10/12, requires 3wk bridge of risperidone, pt understands and agrees  - Clonazepam 0.25mg BID for manish/paranoia  - Ordered though refusing: lithium 300mg BID for mood  - Ordered though refusing: clonidine 0.1mg BID for aggression/impulsivity  - trazodone 100mg qhs for insomnia (home med)  - Benztropine 0.5mg BID for eps (home med)  PRN Agitated psychosis:      - q6h PO risperidone 1mg ODT + lorazepam 2mg + diphenhydramine 50mg      - q6h IM haloperidol 5mg + lorazepam 2mg + diphenhydramine 50mg  - Cannabis and cocaine substance use--continuing to discuss, pt precontemplative, refusing additional engagement/treatment  - Nicotine replacement for nicotine dependence  - Monitor for acute aggression potential given presentation and history  - Routine observation  - Patient wearing street clothing, shoes to be given pending further stabilization and more time without elopement attempts  - Labs including A1c, lipids, prolactin, tsh wnl  - encourage engagement in milieu  - coordinate dispo with outpt team/housing Patient is a 40 year old male who is unemployed, living in [facility] residence with a past psychiatric history of Schizoaffective disorder, following with [facility] OPD, with multiple inpatient hospitalizations secondary to non compliance and also with a history of crack cocaine / Cannabis use disorder with multiple rehab visits and an extensive legal history (currently on parole), who was BIBA for evaluation of agitated bizarre behavior. Patient has been seen and evaluated and currently presents with disorganized behavior, delusion of paranoia, AH and aggressive behavior. Patient is a danger to others and requires involuntary psychiatric hospitalizations (DOCS). Acute decompensation possibly precipitated by substance use, particularly in context of medication nonadherence.     Continues to demonstrate grandiose, persecutory, and paranoid delusions, especially of others being sexually violent/predatory, also somewhat pressured speech, illogical thought process. Generally improved behavioral control, though easily exacerbated by discussion of discharge, increasing aggression as discharge delayed.     Stabilizing partially with oral antipsychotics while bridging to Risperdal Consta given 10/12.     Completing brief course of clonazepam for manish/psychosis, tapering.     Reordering clonidine for aggression/impulsivity though presently refusing. Discussed lithium, ordered to stabilize mood though pt refusing.     Continued tenuous behavioral control. Will need covid-19 quarantine prior to discharge. Working on ACT/AOT referral.    - Risperidone ODT 3mg BID for psychosis, continue until 11/1 as bridge for Risperdal Consta        Gave Risperdal Consta 50mg IM 10/12, requires 3wk bridge of risperidone, pt understands and agrees  - Clonazepam 0.125mg BID for manish/paranoia  - trazodone 100mg qhs for insomnia (home med)  - Benztropine 0.5mg BID for eps (home med)     - Ordered though refusing: lithium 300mg BID for mood     - Ordered though refusing: clonidine 0.1mg BID for aggression/impulsivity    PRN Agitated psychosis:      - q6h PO risperidone 1mg ODT + lorazepam 2mg + diphenhydramine 50mg      - q6h IM haloperidol 5mg + lorazepam 2mg + diphenhydramine 50mg  - Cannabis and cocaine substance use--continuing to discuss, pt precontemplative, refusing additional engagement/treatment  - Nicotine replacement for nicotine dependence  - Monitor for acute aggression potential given presentation and history  - Routine observation  - Patient wearing street clothing, shoes to be given pending further stabilization and more time without elopement attempts  - Labs including A1c, lipids, prolactin, tsh wnl  - encourage engagement in milieu  - coordinate dispo with outpt team/housing

## 2021-10-18 NOTE — BH INPATIENT PSYCHIATRY PROGRESS NOTE - NSBHMETABOLIC_PSY_ALL_CORE_FT
BMI: BMI (kg/m2): 27.3 (10-17-21 @ 11:14)  HbA1c: A1C with Estimated Average Glucose Result: 5.1 % (10-08-21 @ 10:19)    Glucose:   BP: 103/75 (10-16-21 @ 15:48) (103/75 - 103/75)  Lipid Panel: Date/Time: 10-08-21 @ 10:17  Cholesterol, Serum: 171  Direct LDL: --  HDL Cholesterol, Serum: 50  Total Cholesterol/HDL Ration Measurement: --  Triglycerides, Serum: 112

## 2021-10-18 NOTE — BH INPATIENT PSYCHIATRY PROGRESS NOTE - NSBHCHARTREVIEWVS_PSY_A_CORE FT
Vital Signs Last 24 Hrs  T(C): 36.2 (10-17-21 @ 22:43), Max: 36.4 (10-17-21 @ 13:53)  T(F): 97.1 (10-17-21 @ 22:43), Max: 97.5 (10-17-21 @ 13:53)  HR: --  BP: --  BP(mean): --  RR: 17 (10-17-21 @ 19:36) (17 - 17)  SpO2: 98% (10-17-21 @ 19:36) (98% - 98%)    Orthostatic VS  10-17-21 @ 18:27  Lying BP: --/-- HR: --  Sitting BP: 122/75 HR: 61  Standing BP: 112/67 HR: 83  Site: --  Mode: --  Orthostatic VS  10-16-21 @ 19:06  Lying BP: --/-- HR: --  Sitting BP: 113/68 HR: 84  Standing BP: 102/84 HR: 76  Site: --  Mode: --   Vital Signs Last 24 Hrs  T(C): 36.3 (10-18-21 @ 14:24), Max: 36.4 (10-17-21 @ 18:27)  T(F): 97.3 (10-18-21 @ 14:24), Max: 97.5 (10-17-21 @ 18:27)  HR: --  BP: --  BP(mean): --  RR: 17 (10-17-21 @ 19:36) (17 - 17)  SpO2: 98% (10-17-21 @ 19:36) (98% - 98%)    Orthostatic VS  10-17-21 @ 18:27  Lying BP: --/-- HR: --  Sitting BP: 122/75 HR: 61  Standing BP: 112/67 HR: 83  Site: --  Mode: --  Orthostatic VS  10-16-21 @ 19:06  Lying BP: --/-- HR: --  Sitting BP: 113/68 HR: 84  Standing BP: 102/84 HR: 76  Site: --  Mode: --

## 2021-10-19 VITALS — TEMPERATURE: 98 F

## 2021-10-19 PROCEDURE — 99239 HOSP IP/OBS DSCHRG MGMT >30: CPT

## 2021-10-19 RX ORDER — PALIPERIDONE 1.5 MG/1
1 TABLET, EXTENDED RELEASE ORAL
Qty: 0 | Refills: 0 | DISCHARGE

## 2021-10-19 RX ORDER — NICOTINE POLACRILEX 2 MG
1 GUM BUCCAL
Qty: 0 | Refills: 0 | DISCHARGE
Start: 2021-10-19

## 2021-10-19 RX ORDER — LITHIUM CARBONATE 300 MG/1
1 TABLET, EXTENDED RELEASE ORAL
Qty: 0 | Refills: 0 | DISCHARGE
Start: 2021-10-19

## 2021-10-19 RX ORDER — HALOPERIDOL DECANOATE 100 MG/ML
5 INJECTION INTRAMUSCULAR ONCE
Refills: 0 | Status: COMPLETED | OUTPATIENT
Start: 2021-10-19 | End: 2021-10-19

## 2021-10-19 RX ORDER — CLONAZEPAM 1 MG
1 TABLET ORAL
Qty: 0 | Refills: 0 | DISCHARGE
Start: 2021-10-19

## 2021-10-19 RX ORDER — RISPERIDONE 4 MG/1
1 TABLET ORAL
Qty: 0 | Refills: 0 | DISCHARGE
Start: 2021-10-19 | End: 2021-11-01

## 2021-10-19 RX ADMIN — HALOPERIDOL DECANOATE 5 MILLIGRAM(S): 100 INJECTION INTRAMUSCULAR at 20:00

## 2021-10-19 RX ADMIN — Medication 2 MILLIGRAM(S): at 14:55

## 2021-10-19 RX ADMIN — RISPERIDONE 3 MILLIGRAM(S): 4 TABLET ORAL at 09:28

## 2021-10-19 RX ADMIN — Medication 0.5 MILLIGRAM(S): at 09:29

## 2021-10-19 RX ADMIN — Medication 2 MILLIGRAM(S): at 20:00

## 2021-10-19 RX ADMIN — HALOPERIDOL DECANOATE 5 MILLIGRAM(S): 100 INJECTION INTRAMUSCULAR at 14:55

## 2021-10-19 RX ADMIN — Medication 0.12 MILLIGRAM(S): at 09:29

## 2021-10-19 RX ADMIN — Medication 50 MILLIGRAM(S): at 14:55

## 2021-10-19 NOTE — BH INPATIENT PSYCHIATRY DISCHARGE NOTE - VIOLENCE RISK FACTORS:
Feeling of being under threat and being unable to control threat/Violent ideation/threat/speech/Substance abuse/Affective dysregulation/Impulsivity/Lack of insight into violence risk/need for treatment/Noncompliance with treatment/Irritability/History of violation of a legal mandate (e.g., parole, probation, AOT)/Elopement history or risk
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
## 2021-10-19 NOTE — BH INPATIENT PSYCHIATRY PROGRESS NOTE - NSICDXBHTERTIARYDX_PSY_ALL_CORE
R/O Post traumatic stress disorder (PTSD)   F43.10  

## 2021-10-19 NOTE — BH INPATIENT PSYCHIATRY DISCHARGE NOTE - NSDCMRMEDTOKEN_GEN_ALL_CORE_FT
clonazePAM 0.125 mg oral tablet, disintegratin tab(s) orally 2 times a day  cloNIDine 0.1 mg oral tablet: 1 tab(s) orally 2 times a day  Cogentin 0.5 mg oral tablet: 1 tab(s) orally 2 times a day  hydrOXYzine hydrochloride 50 mg oral tablet: 1 tab(s) orally 4 times a day, As Needed  Lithobid 300 mg oral tablet, extended release: 1 tab(s) orally 2 times a day  Melatonin 5 mg oral tablet: 1 tab(s) orally once a day (at bedtime)  nicotine: 1 gum orally 6 times a day  nicotine 21 mg/24 hr transdermal film, extended release: 1 patch transdermal once a day  risperiDONE 3 mg oral tablet, disintegratin tab(s) orally 2 times a day  traZODone 100 mg oral tablet: 1 tab(s) orally once a day (at bedtime)

## 2021-10-19 NOTE — BH INPATIENT PSYCHIATRY PROGRESS NOTE - NSCGIIMPROVESX_PSY_ALL_CORE
4 = No change - symptoms remain essentially unchanged

## 2021-10-19 NOTE — BH INPATIENT PSYCHIATRY PROGRESS NOTE - NSBHMSEPERCEPT_PSY_A_CORE
Auditory hallucinations
No abnormalities
Auditory hallucinations
No abnormalities
No abnormalities
Auditory hallucinations
No abnormalities
Auditory hallucinations
No abnormalities
Auditory hallucinations
No abnormalities

## 2021-10-19 NOTE — BH INPATIENT PSYCHIATRY DISCHARGE NOTE - NSDCPROCEDURESFT_PSY_ALL_CORE
COVID-19 PCR: NotDetec (15 Oct 2021 15:23)  COVID-19 PCR: NotDetec (10 Oct 2021 22:36)  COVID-19 PCR: NotDetec (05 Oct 2021 19:36)  COVID-19 PCR: NotDetec (29 Sep 2021 16:44)  COVID-19 PCR: NotDetec (23 Jun 2021 17:19)  COVID-19 PCR: NotDetec (07 May 2021 14:48)

## 2021-10-19 NOTE — BH INPATIENT PSYCHIATRY PROGRESS NOTE - NSTXPSYCHODATEEST_PSY_ALL_CORE
06-Oct-2021
30-Sep-2021
14-Oct-2021
30-Sep-2021
14-Oct-2021
12-Oct-2021
30-Sep-2021
30-Sep-2021
14-Oct-2021
06-Oct-2021
14-Oct-2021
30-Sep-2021
14-Oct-2021
06-Oct-2021
30-Sep-2021
14-Oct-2021
14-Oct-2021
06-Oct-2021

## 2021-10-19 NOTE — BH INPATIENT PSYCHIATRY PROGRESS NOTE - NSBHMSEAFFCONG_PSY_A_CORE
Congruent
Congruent
Non-congruent
Congruent
Congruent
Non-congruent
Congruent
Non-congruent
Non-congruent
Congruent
Non-congruent
Congruent

## 2021-10-19 NOTE — BH INPATIENT PSYCHIATRY DISCHARGE NOTE - NSBHDCMEDICALFT_PSY_A_CORE
Possible exposure to covid from patient on the unit with confirmed infection. Negative Covid-19 PCR results:     COVID-19 PCR: NotDetec (15 Oct 2021 15:23)  COVID-19 PCR: NotDetec (10 Oct 2021 22:36)  COVID-19 PCR: NotDetec (05 Oct 2021 19:36)    Continued covid quarantine at time of transfer to [facility]

## 2021-10-19 NOTE — BH INPATIENT PSYCHIATRY PROGRESS NOTE - NSTXPROBIMPULS_PSY_ALL_CORE
IMPULSIVITY/AGITATION
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
## 2021-10-19 NOTE — BH INPATIENT PSYCHIATRY PROGRESS NOTE - CURRENT MEDICATION
MEDICATIONS  (STANDING):  benztropine 0.5 milliGRAM(s) Oral two times a day  clonazePAM Oral Disintegrating Tablet 0.125 milliGRAM(s) Oral two times a day  cloNIDine 0.1 milliGRAM(s) Oral two times a day  lithium SR (LITHOBID) 300 milliGRAM(s) Oral two times a day  nicotine - 21 mG/24Hr(s) Patch 1 Patch Transdermal daily  risperiDONE  Disintegrating Tablet 3 milliGRAM(s) Oral two times a day  traZODone 100 milliGRAM(s) Oral at bedtime    MEDICATIONS  (PRN):  diphenhydrAMINE 50 milliGRAM(s) Oral every 6 hours PRN agitation/impulsivity  diphenhydrAMINE   Injectable 50 milliGRAM(s) IntraMuscular every 6 hours PRN Agitation  haloperidol    Injectable 5 milliGRAM(s) IntraMuscular once PRN acute agitation  haloperidol    Injectable 5 milliGRAM(s) IntraMuscular once PRN acute agitation  nicotine  Polacrilex Gum 4 milliGRAM(s) Oral every 3 hours PRN cravings  risperiDONE  Disintegrating Tablet 1 milliGRAM(s) Oral every 6 hours PRN acute agitation/psychosis
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
## 2021-10-19 NOTE — BH INPATIENT PSYCHIATRY PROGRESS NOTE - NSBHFUPINTERVALCCFT_PSY_A_CORE
Psychosis/aggression

## 2021-10-19 NOTE — BH INPATIENT PSYCHIATRY PROGRESS NOTE - NSBHASSESSSUMMFT_PSY_ALL_CORE
Patient is a 40 year old male who is unemployed, living in [facility] residence with a past psychiatric history of Schizoaffective disorder, following with [facility], with multiple inpatient hospitalizations secondary to non compliance and also with a history of crack cocaine / Cannabis use disorder with multiple rehab visits and an extensive legal history (currently on parole), who was BIBA for evaluation of agitated bizarre behavior. Patient has been seen and evaluated and currently presents with disorganized behavior, delusion of paranoia, AH and aggressive behavior. Patient is a danger to others and requires involuntary psychiatric hospitalizations (DOCS). Acute decompensation possibly precipitated by substance use, particularly in context of medication nonadherence.     Continues to demonstrate grandiose, persecutory, and paranoid delusions, especially of others being sexually violent/predatory, also somewhat pressured speech, illogical thought process. Generally improved behavioral control, though easily exacerbated by discussion of discharge, increasing aggression as discharge delayed.     Stabilizing partially with oral antipsychotics while bridging to Risperdal Consta given 10/12.     Completing brief course of clonazepam for manish/psychosis, tapering.     Reordering clonidine for aggression/impulsivity though presently refusing. Discussed lithium, ordered to stabilize mood though pt refusing.     Continued tenuous behavioral control. Will need covid-19 quarantine prior to discharge. Working on ACT/AOT referral.    - Risperidone ODT 3mg BID for psychosis, continue until 11/1 as bridge for Risperdal Consta        Gave Risperdal Consta 50mg IM 10/12, requires 3wk bridge of risperidone, pt understands and agrees  - Clonazepam 0.125mg BID for manish/paranoia  - trazodone 100mg qhs for insomnia (home med)  - Benztropine 0.5mg BID for eps (home med)     - Ordered though refusing: lithium 300mg BID for mood     - Ordered though refusing: clonidine 0.1mg BID for aggression/impulsivity    PRN Agitated psychosis:      - q6h PO risperidone 1mg ODT + lorazepam 2mg + diphenhydramine 50mg      - q6h IM haloperidol 5mg + lorazepam 2mg + diphenhydramine 50mg  - Cannabis and cocaine substance use--continuing to discuss, pt precontemplative, refusing additional engagement/treatment  - Nicotine replacement for nicotine dependence  - Monitor for acute aggression potential given presentation and history  - Routine observation  - Patient wearing street clothing, shoes to be given pending further stabilization and more time without elopement attempts  - Labs including A1c, lipids, prolactin, tsh wnl  - encourage engagement in milieu  - coordinate dispo with outpt team/housing

## 2021-10-19 NOTE — BH INPATIENT PSYCHIATRY PROGRESS NOTE - NSBHROSSYSTEMS_PSY_ALL_CORE
Psychiatric

## 2021-10-19 NOTE — BH INPATIENT PSYCHIATRY PROGRESS NOTE - NSBHMSEKNOWHOW_PSY_ALL_CORE
Vocabulary

## 2021-10-19 NOTE — BH INPATIENT PSYCHIATRY PROGRESS NOTE - NSICDXBHPRIMARYDX_PSY_ALL_CORE
Schizoaffective psychosis   F25.9  

## 2021-10-19 NOTE — BH INPATIENT PSYCHIATRY PROGRESS NOTE - NSTXPSYCHOPROGRES_PSY_ALL_CORE
Improving
No Change
Improving
No Change
Improving
No Change
Improving
No Change
Improving
No Change
Improving
Improving
No Change
Improving
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
## 2021-10-19 NOTE — BH INPATIENT PSYCHIATRY PROGRESS NOTE - NSBHMSEINSIGHT_PSY_A_CORE
Poor
DISCHARGE
Poor

## 2021-10-19 NOTE — BH INPATIENT PSYCHIATRY PROGRESS NOTE - NSBHINPTBILLING_PSY_ALL_CORE
14554 - Inpatient Moderate Complexity
27112 - Inpatient Moderate Complexity
27667 - Inpatient Moderate Complexity
25863 - Inpatient Moderate Complexity
92744 - Inpatient Moderate Complexity
04427 - Inpatient Moderate Complexity
65652 - Inpatient Moderate Complexity
95782 - Inpatient Moderate Complexity
31896 - Inpatient Moderate Complexity
47891 - Inpatient Moderate Complexity
91646 - Inpatient Moderate Complexity
06410 - Inpatient Moderate Complexity
05070 - Inpatient Moderate Complexity
92594 - Inpatient Moderate Complexity
06722 - Inpatient Moderate Complexity
76783 - Inpatient Moderate Complexity
70024 - Inpatient Moderate Complexity
85224 - Inpatient Moderate Complexity

## 2021-10-19 NOTE — BH DISCHARGE NOTE NURSING/SOCIAL WORK/PSYCH REHAB - NSDCPRGOAL_PSY_ALL_CORE
Pt has made minimal progress towards goal, as patient remains verbally aggressive, threatening, and delusional upon discharge. Pt is being trasnferred to [facility] due to a recent COVID-19 outbreak on ML[facility]

## 2021-10-19 NOTE — BH INPATIENT PSYCHIATRY PROGRESS NOTE - NSTXIMPULSDATEEST_PSY_ALL_CORE
01-Oct-2021
14-Oct-2021
12-Oct-2021
06-Oct-2021
14-Oct-2021
01-Oct-2021
14-Oct-2021
14-Oct-2021
01-Oct-2021
01-Oct-2021
14-Oct-2021
14-Oct-2021
01-Oct-2021
06-Oct-2021
14-Oct-2021

## 2021-10-19 NOTE — BH INPATIENT PSYCHIATRY PROGRESS NOTE - NSTXDCOPNODATEEST_PSY_ALL_CORE
01-Oct-2021
01-Oct-2021
15-Oct-2021
01-Oct-2021
08-Oct-2021
15-Oct-2021
15-Oct-2021
01-Oct-2021
08-Oct-2021
01-Oct-2021
08-Oct-2021
15-Oct-2021
15-Oct-2021
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
## 2021-10-19 NOTE — BH INPATIENT PSYCHIATRY PROGRESS NOTE - OTHER
cooperative though at times tenuous
persecutory, paranoid
grandiose, persecutory, paranoid
grandiose, persecutory, paranoid
persecutory, paranoid
cooperative though at times tenuous
grandiose, persecutory, paranoid

## 2021-10-19 NOTE — BH INPATIENT PSYCHIATRY PROGRESS NOTE - NSTXIMPULSGOAL_PSY_ALL_CORE
Will be able to demonstrate the ability to pause before acting out negatively
Will identify 1 thought that precedes an impulsive acts
Other...
Will identify 1 thought that precedes an impulsive acts
Will identify 1 thought that precedes an impulsive acts
Other...
Will be able to recognize 2+ triggers
Will identify 1 thought that precedes an impulsive acts
Other...
Other...
Will identify 1 thought that precedes an impulsive acts
Other...
Will identify 1 thought that precedes an impulsive acts
Other...
Will identify 1 thought that precedes an impulsive acts
Will be able to recognize 2+ triggers

## 2021-10-19 NOTE — BH INPATIENT PSYCHIATRY PROGRESS NOTE - NSTXPSYCHOGOAL_PSY_ALL_CORE
Will identify 2 coping skills that assist with focus on reality
Will report using relaxation skills 3 times a day to reduce anxiety about delusions/hallucinations
Will identify 2 coping skills that help mitigate hallucinations
Will report using relaxation skills 3 times a day to reduce anxiety about delusions/hallucinations
Will be able to report experiencing hallucinations to staff
Will identify 2 coping skills that assist with focus on reality
Will identify 2 coping skills that assist with focus on reality
Will identify 2 coping skills that help mitigate hallucinations
Will identify 2 coping skills that assist with focus on reality
Will report using relaxation skills 3 times a day to reduce anxiety about delusions/hallucinations
Will identify 2 coping skills that help mitigate hallucinations
Will report using relaxation skills 3 times a day to reduce anxiety about delusions/hallucinations

## 2021-10-19 NOTE — BH INPATIENT PSYCHIATRY PROGRESS NOTE - NSBHMSEBODY_PSY_A_CORE
Average build

## 2021-10-19 NOTE — BH INPATIENT PSYCHIATRY PROGRESS NOTE - NSTXDCOPNOGOAL_PSY_ALL_CORE
Will agree to participate in appropriate outpatient care

## 2021-10-19 NOTE — BH INPATIENT PSYCHIATRY PROGRESS NOTE - NSTXPSYCHODATETRGT_PSY_ALL_CORE
21-Oct-2021
07-Oct-2021
07-Oct-2021
19-Oct-2021
21-Oct-2021
07-Oct-2021
13-Oct-2021
21-Oct-2021
13-Oct-2021
13-Oct-2021
21-Oct-2021
13-Oct-2021
07-Oct-2021
21-Oct-2021
07-Oct-2021
13-Oct-2021
07-Oct-2021
21-Oct-2021
21-Oct-2021
13-Oct-2021

## 2021-10-19 NOTE — BH CHART NOTE - NSEVENTNOTEFT_PSY_ALL_CORE
ROM called for activation of IM medication due to patient agitation. Psych emergency called. Per staff patient  was agitated, refusing transfer to [facility], unable to be redirected, refusing PO prn medications. Haldol 5 / Ativan 2 were activated for threat to self and others. Patient seen after IM administered, noted to be resting comfortably in NAD, IM with fair effect. EMS transferred patient to [facility] after restraint placement. Pt with high elopement risk, case discussed with primary team.     Interval History:  After restraint placement, pt physical exam completed. Pt placed in restraints comfortably. Previous prn medication with modest effect, patient denies physical pain or complaints.     T(C): 36.5 (10-19-21 @ 14:42), Max: 37.1 (10-18-21 @ 22:29)  HR: --  BP: --  RR: --  SpO2: --    Physical Exam:  Gen: Patient placed comfortably in restraints, NAD despite aggressive yelling.   HEENT: NC/AT,  EOMI.   Resp: Breathing comfortably, nonlabored   Ext: Restraints placed appropriately on all extremities (able to easily fit 2 fingers under each restraint). No clubbing, edema, or cyanosis. 5/5 strength throughout all extremities. 2+ pulses of all extremities.   Neuro: awake, alert, grossly oriented.     Assessment:  ROM called for patient agitation, SI/HI and violence towards staff requiring IM prn medication and 4-point restraints for continued agitation, aggression and threatening behavior. Pt placed comfortably in restraints, clinically stable with exam otherwise unremarkable.     Plan:  1. Will c/w restraints for threat of harm to self/others. Release patient in shortest time possible. [facility] to determine if further indication for restraints and when to release pt   2. d/w RN staff who agree with plan.  
Interval History:  ROM called for psych emergency due to patient agitation. Per staff patient has been verbally loud, demanding discharge, also on AWOL risk. Pt was demanding discharge this morning and began to punch walls, slamming his room door (which broke as a result), unable to be redirected, refusing PO prn medications. Haldol 5 mg IM x 1, Ativan 2 mg IM x 1, Benadryl 50 mg IM x 1 activated for threat to self and severe agitation. Patient seen after IM administered, noted to be resting comfortably in NAD, IM with fair effect. Pt continuing to demand discharge. Pt denies pain, decreased mobility, numbness, burning in hands. Advised RN staff to notify ROM if patient continues to be agitated.    Orthostatic VS  10-01-21 @ 19:54  Lying BP: --/-- HR: --   Sitting BP: 98/61 HR: 76  Standing BP: 110/66 HR: 79  Site: --   Mode: --    Physical Exam:  Gen: Patient laying in bed, NAD    Skin: Mild erythema over dorsal aspect of bilateral hands. No cuts, swelling.    HEENT: NC. EOMI, full ROM.   CV: Radial pulses 2+ b/l, RRR, no murmurs.     Ext: ROM intact, no clubbing/cyanosis/edema   MSK: good range of motion in bilateral hands fists formation, wrist flexion and extension. No bony tenderness in bilateral hand MCP, DIP, PIP joints, snuff box.  Neuro: awake, alert, grossly oriented. CN II-XII grossly intact. Good  strength in bilateral hands. Denies paresthesia in hands.    Assessment:  ROM called to evaluate pt for psych emergency, self-injurious behavior due to punching walls. Pt now calm after receiving IMs. Physical exam unremarkable, not concerning for fracture in hands.    Plan:  1. C/w routine monitoring  2. Patient and nursing staff agree to alert ROM if pain, erythema or other symptoms develop.  3. d/w RN staff who agree with plan.
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
## 2021-10-19 NOTE — BH INPATIENT PSYCHIATRY PROGRESS NOTE - NSBHCHARTREVIEWVS_PSY_A_CORE FT
Vital Signs Last 24 Hrs  T(C): 37.1 (10-18-21 @ 22:29), Max: 37.1 (10-18-21 @ 22:29)  T(F): 98.8 (10-18-21 @ 22:29), Max: 98.8 (10-18-21 @ 22:29)  HR: --  BP: --  BP(mean): --  RR: --  SpO2: --    Orthostatic VS  10-18-21 @ 22:29  Lying BP: --/-- HR: --  Sitting BP: 99/66 HR: 63  Standing BP: 98/67 HR: 84  Site: --  Mode: --  Orthostatic VS  10-17-21 @ 18:27  Lying BP: --/-- HR: --  Sitting BP: 122/75 HR: 61  Standing BP: 112/67 HR: 83  Site: --  Mode: --

## 2021-10-19 NOTE — BH INPATIENT PSYCHIATRY PROGRESS NOTE - NSBHMSEINTELL_PSY_A_CORE
Average
Below Average
Average
Below Average
Below Average
Average
Average
Below Average
Below Average
Average

## 2021-10-19 NOTE — BH INPATIENT PSYCHIATRY DISCHARGE NOTE - NSBHMETABOLIC_PSY_ALL_CORE_FT
BMI: BMI (kg/m2): 27.3 (10-17-21 @ 11:14)  HbA1c: A1C with Estimated Average Glucose Result: 5.1 % (10-08-21 @ 10:19)    Glucose:   BP: --  Lipid Panel: Date/Time: 10-08-21 @ 10:17  Cholesterol, Serum: 171  Direct LDL: --  HDL Cholesterol, Serum: 50  Total Cholesterol/HDL Ration Measurement: --  Triglycerides, Serum: 112

## 2021-10-19 NOTE — BH INPATIENT PSYCHIATRY DISCHARGE NOTE - NSBHDCSIGEVENTSFT_PSY_A_CORE
Episode of acute aggression requiring seclusion, difficulty deescalating. Other episodes of acute aggression/agitation requiring emergency IM medication or PRN of PO medication. Episode of attempting to elope from the unit.   See course of treatment above

## 2021-10-19 NOTE — BH DISCHARGE NOTE NURSING/SOCIAL WORK/PSYCH REHAB - NSCDUDCCRISIS_PSY_A_CORE
[facility]  1 (508) [facility] ([phone])  Text "WELL" to 96412  Website: www.WebEvents/.[facility] 1 (627) 151-IFGT (0560) Website: www.safehorizon.org/.National Suicide Prevention Lifeline 7 (034) 153-8948/.  [facility]  1 (688) LIFEN[facility] (902-9036)/.  [facility]  [phone] [place] [phone]   Hours:  Monday through Friday from 9 AM to 3 PM/.  [facility] [facility] - [facility] Crisis Line  1 [phone] 098-7821, Option 1

## 2021-10-19 NOTE — BH INPATIENT PSYCHIATRY PROGRESS NOTE - NSTXDCOPNODATETRGT_PSY_ALL_CORE
08-Oct-2021
15-Oct-2021
08-Oct-2021
08-Oct-2021
22-Oct-2021
08-Oct-2021
22-Oct-2021
15-Oct-2021
22-Oct-2021
22-Oct-2021
15-Oct-2021
15-Oct-2021
08-Oct-2021
15-Oct-2021
08-Oct-2021
15-Oct-2021
22-Oct-2021

## 2021-10-19 NOTE — BH INPATIENT PSYCHIATRY PROGRESS NOTE - NSTXIMPULSDATETRGT_PSY_ALL_CORE
14-Oct-2021
08-Oct-2021
14-Oct-2021
08-Oct-2021
21-Oct-2021
21-Oct-2021
08-Oct-2021
14-Oct-2021
13-Oct-2021
21-Oct-2021
08-Oct-2021
21-Oct-2021
08-Oct-2021
08-Oct-2021
13-Oct-2021
21-Oct-2021
21-Oct-2021
14-Oct-2021
21-Oct-2021
20-Oct-2021

## 2021-10-19 NOTE — BH INPATIENT PSYCHIATRY PROGRESS NOTE - LEVEL OF CONSCIOUSNESS
Alert
Lethargic, arousable to verbal stimulus
Alert
Lethargic, arousable to verbal stimulus
Lethargic, arousable to verbal stimulus
Alert
Lethargic, arousable to verbal stimulus
Alert
Lethargic, arousable to verbal stimulus
Alert

## 2021-10-19 NOTE — BH INPATIENT PSYCHIATRY DISCHARGE NOTE - NSDCCPCAREPLAN_GEN_ALL_CORE_FT
PRINCIPAL DISCHARGE DIAGNOSIS  Diagnosis: Schizoaffective psychosis  Assessment and Plan of Treatment:       SECONDARY DISCHARGE DIAGNOSES  Diagnosis: Cannabis abuse, daily use  Assessment and Plan of Treatment:     Diagnosis: Cocaine use disorder  Assessment and Plan of Treatment:     Diagnosis: Poor compliance with medication  Assessment and Plan of Treatment:     Diagnosis: Tobacco dependence  Assessment and Plan of Treatment:     Diagnosis: Dysfunctional alcohol use  Assessment and Plan of Treatment:      PRINCIPAL DISCHARGE DIAGNOSIS  Diagnosis: Schizoaffective psychosis  Assessment and Plan of Treatment:       SECONDARY DISCHARGE DIAGNOSES  Diagnosis: Cannabis abuse, daily use  Assessment and Plan of Treatment:     Diagnosis: Cocaine use disorder  Assessment and Plan of Treatment:     Diagnosis: Poor compliance with medication  Assessment and Plan of Treatment:     Diagnosis: Tobacco dependence  Assessment and Plan of Treatment:     Diagnosis: Dysfunctional alcohol use  Assessment and Plan of Treatment:     Diagnosis: Post traumatic stress disorder (PTSD)  Assessment and Plan of Treatment:     Diagnosis: Intermittent explosive disorder  Assessment and Plan of Treatment:

## 2021-10-19 NOTE — BH INPATIENT PSYCHIATRY PROGRESS NOTE - NSTXIMPULSPROGRES_PSY_ALL_CORE
Improving
No Change
Improving
No Change
No Change
Improving
Improving
No Change
No Change
Improving
No Change
Improving
No Change
No Change
Improving
No Change
No Change
Improving

## 2021-10-20 NOTE — SOCIAL WORK POST DISCHARGE FOLLOW UP NOTE - NSBHSWFOLLOWUP_PSY_ALL_CORE_FT
Pt was transferred to [facility] in [place] to continue care and complete quarantine after being exposed to Covid-19.

## 2021-10-23 NOTE — ED PROVIDER NOTE - CPE EDP RESP NORM
History and Physical  Name:  Tori Domínguez   : 1971  49 yrs  MRN:[id number]  PCP:OLMAN Leo  Admit Diagnosis: Hypoxemia [R09.02]  COVID-19 virus infection [U07.1]  Date: 10/23/2021 Attending:Jett Bhatia DO    CC:  Chief Complaint   Patient presents with   • Suspected Coronavirus (Covid-19)   • Shortness of Breath       HPI:Tori Domínguez is a 49 yrs old female who went to urgent care today w/ complaints of fatigue, fevers.  Her  has been ill for over a week with a febrile URI -presumed to be COVID but not tested and she has been exposed thru her [employer] fellowship.  Her vaccine status is unvaccinated.  Her pulse ox in urgent care was 88 - 90% and her temp was 102.1.  She was triaged to the ED for further eval / treatment where she has required 3 - 4 liters of oxygen to maintain sat > 90%.  She tells me she does not think she is significantly more short of breath than usual, she is very sedentary so cant really determine if she has more DANIEL.  He angio of the chest done for elevated D-dimer of over 2 shows no PE, diffuse consolidative and ground-glass opacities consistent with multifocal viral pneumonia.  She tells me she does not have much of a cough.  She does have a history of asthma.  She has not been using inhalers very often typically she does not use it very often despite being short of breath.  She is diabetic does not check her blood sugars.  Glucose on CMP today is 245. She does have chronic kidney disease with a GFR averaging in the 30s.  Her creatinine today is 1.67 with a GFR of 35 which is stable for her.  Inflammatory markers are elevated with , procalcitonin 0.24, D-dimer 2.14.  CRP is 12, ferritin is 281.  White count platelets are normal, ALC is normal.      ED Course:  Blood pressures been in the 120-160 systolic range.  Heart rate in the 90s.  Oxygen saturation as noted above.  She was initially afebrile at 97.8 however currently she is 100° or orally.   She was given dexamethasone 6 mg IV in the ED.      PMH: reviewed and confirmed:  Past Medical History:   Diagnosis Date   • Allergic rhinitis 5/4/2015   • Allergic rhinitis, cause unspecified 9/17/2013   • Asthma 7/13/2014   • Atypical moles 12/15/2014   • Chronic kidney disease (CKD) 6/11/2013   • Depression    • Diabetes mellitus, type 2 (CMS/formerly [facility]) 9/14/2011   • History of acute renal failure     Requiring dialysis, believed secondary to Cipro   • History of cellulitis of skin with lymphangitis    • Hypertension    • Hypothyroidism    • Lymphedema 7/1/2012   • Morbid obesity (CMS/formerly [facility])    • Restless leg syndrome 8/26/2014   • Sleep apnea     cpap       PSH: reviewed and confirmed  Past Surgical History:   Procedure Laterality Date   • Cervical polypectomy  2000   • Cholecystectomy  2004   • D and c      July 2021   • Dilation and curettage of uterus  2000   • Gynecologic cryosurgery  2000   • Sinus surgery      deviated septum, ? nasal polyp removal       Allergies:  ALLERGIES:   Allergen Reactions   • Amoxicillin Other (See Comments)     Patient describes as \"electrical\" shocks in legs.    • Ciprofloxacin Other (See Comments)       Medications  Medications Prior to Admission   Medication Sig Dispense Refill   • losartan (COZAAR) 100 MG tablet Take 1 tablet by mouth once daily 30 tablet 3   • rOPINIRole (REQUIP) 1 MG tablet TAKE 1 TABLET BY MOUTH IN THE MORNING, 2 TABLETS AT NOON, AND 1 TABLET AT BEDTIME 120 tablet 1   • levothyroxine 200 MCG tablet Take 1 tablet by mouth daily. 90 tablet 1   • levothyroxine (Euthyrox) 125 MCG tablet Take one tablet by mouth every Monday, Wednesday, Friday 36 tablet 1   • levothyroxine 100 MCG tablet Take 1 tablet by mouth every Tuesday, Thursday, Saturday, Sunday 48 tablet 1   • glipiZIDE (GLUCOTROL) 10 MG tablet Take 1 tablet by mouth 2 times daily (before meals). 180 tablet 1   • spironolactone (ALDACTONE) 25 MG tablet Take 1 tablet by mouth daily. 90 tablet 1   • furosemide  (LASIX) 80 MG tablet Take 0.5 tablets by mouth daily. 90 tablet 0   • cloNIDine (CATAPRES) 0.1 MG tablet Take 1 tablet by mouth daily. RX IS FAXED PER DR MAYNARD ON CALL PROTOCOL 90 tablet 0   • Januvia 100 MG tablet Take 1 tablet by mouth once daily 30 tablet 3   • lovastatin (MEVACOR) 10 MG tablet Take 1 tablet by mouth nightly. 90 tablet 1   • amLODIPine (NORVASC) 10 MG tablet Take 1 tablet by mouth daily. 90 tablet 1   • insulin degludec (Tresiba FlexTouch) 200 UNIT/ML pen-injector Inject 90 Units into the skin 2 times daily. Prime 2 units before each dose. 18 mL 3   • citalopram (CeleXA) 40 MG tablet Take 1 tablet by mouth daily. 90 tablet 1   • albuterol (Ventolin HFA) 108 (90 Base) MCG/ACT inhaler Inhale 2 puffs into the lungs up to four times daily as needed. 18 g 3   • gabapentin (NEURONTIN) 300 MG capsule Take 1 capsule by mouth at bedtime as needed (Pain). Take 3 capsules po before bedtime 90 capsule 3   • Advair Diskus 500-50 MCG/DOSE inhaler INHALE 1 PUFF BY MOUTH EVERY 12 HOURS (RINSE MOUTH AFTER USE)     • fluticasone (FLOVENT HFA) 220 MCG/ACT inhaler Inhale 1 puff into the lungs 2 times daily. 1 Inhaler 6   • fexofenadine (ALLEGRA) 180 MG tablet Take 180 mg by mouth daily.     • ACCU-CHEK ALBERTO PLUS strip USE ONE STRIP TO CHECK LEVELS DAILY 100 each 11       Social History:  Social History     Tobacco Use   • Smoking status: Never Smoker   • Smokeless tobacco: Never Used   Vaping Use   • Vaping Use: never used   Substance Use Topics   • Alcohol use: Yes     Comment: once or twice a month   • Drug use: No       Family History:  Family History   Problem Relation Age of Onset   • Hypertension Mother    • Hypertension Father    • Stroke Maternal Grandmother    • Diabetes Maternal Grandmother    • Cancer Maternal Grandfather    • Myocardial Infarction Paternal Grandmother          ROS:    CONSTITUTIONAL:  Fevers, fatigue, generalized myalgias as noted above.  EYES:  Denies vision changes  ENT/o/p: Denies  ear ache, tinnitus, sore throat, rhinorhea, sinus pain or pressure, mouth sores or lesions,  difficulty chewing or swallowing    CV:  Denies chest pain or pressure, palpitations,  RESPIRATORY: Denies cough, purulent sputum, hemoptysis, does have sleep apnea, does have wheezing intermittently but rarely uses her MDI.   GI:  Denies abdominal pain,diarrhea or constipation, black or bloody stools, nausea or vomiting,  frequent heart burn.  Feels appetite is at baseline.  : Denies frequency,dysuria, hematuria, retention  MSK / EXTREMITIES / SKIN: Denies joint(s) swelling and redness, sores, wounds or rashes.  Does have chronic back pain that is essentially unchanged.  She does have chronic lower extremity edema that is unchanged.  ENDOCRINE:  Denies excessive thirst, urination or hunger, intolerance to heat or cold, hypoglycemic events, does not check blood sugar  HEME/LYMPH:  Denies: unexplained or unusual bleeding or bruising, swollen lymph nodes  NEURO:  Denies headaches,  dizziness or lightheadedness, balance problems, tremors,  memory problems, unclear thinking, difficulty speaking or word finding focal weakness, no focal numbness.   PSYCH:  Does have a history of anxiety and depression is currently taking citalopram  Temp:  [97.8 °F (36.6 °C)-102.1 °F (38.9 °C)] 100 °F (37.8 °C)  Heart Rate:  [] 92  Resp:  [22-28] 22  BP: (122-167)/(60-80) 138/70      Physical Exam:     Gen: Appears stated age, in no acute distress,   Skin: No rashes or suspicious lesions to inspection or palpation. Normal turgor  Eyes: Conjunctiva, lids and sclera are normal.  PERRL.  EOMI, no nystagmus  HEENT:  Deferred due to COVID masking and symptoms.  Neck: supple, no meningismus, no adenopathy or thyromegaly.   Lungs:  Mild tachypnea.  Diffuse fine crackles with intermittent wheezes.  Peripheral pulses +2 bilaterally.    Abd:  Obese habitus come Soft, no tenderness, Hypoactive bowel sounds.    MSK / EXTREMITIES: no clubbing or  cyanosis.  No swollen, red or tender joints.  Bilateral lower extremity edema with hypertrophic skin and stasis dermatitis changes.  Extremities are warm, pink.    NEURO:  Alert and oriented x 4.  Moves all extremities.  No tremor or focal weakness noted. Sensation to light touch intact in all extremities.   PSYCH:  Affect is somewhat flat but she is pleasant, engaged, cooperative.           Labs:  CBC:    WBC (K/mcL)   Date Value   10/23/2021 5.8   08/26/2021 11.5 (H)     RBC (mil/mcL)   Date Value   10/23/2021 5.02     HCT (%)   Date Value   10/23/2021 41.5   08/26/2021 43.3     HGB (g/dL)   Date Value   10/23/2021 13.2   08/26/2021 13.5     PLT (K/mcL)   Date Value   10/23/2021 147   08/26/2021 234       PT/INR:   Prothrombin Time (sec)   Date Value   10/23/2021 10.6     INR (no units)   Date Value   10/23/2021 1.0         CMP:   Sodium (mmol/L)   Date Value   10/23/2021 135   08/26/2021 134 (L)   04/22/2021 135     Potassium (mmol/L)   Date Value   10/23/2021 4.9   08/26/2021 5.5 (H)   04/22/2021 5.4 (H)     Calcium (mg/dL)   Date Value   10/23/2021 8.1 (L)   08/26/2021 9.2     Carbon Dioxide (mmol/L)   Date Value   10/23/2021 25   08/26/2021 27     BUN (mg/dL)   Date Value   10/23/2021 27 (H)   08/26/2021 39 (H)   04/22/2021 38 (H)     Creatinine (mg/dL)   Date Value   10/23/2021 1.67 (H)   08/26/2021 1.71 (H)   04/22/2021 1.54 (H)     Albumin (g/dL)   Date Value   10/23/2021 2.6 (L)   08/26/2021 3.1 (L)     Alkaline Phosphatase (Units/L)   Date Value   10/23/2021 69   08/26/2021 86     GOT/AST (Units/L)   Date Value   10/23/2021 25   08/26/2021 14     GPT/ALT (Units/L)   Date Value   10/23/2021 24   08/26/2021 28     Chloride (mmol/L)   Date Value   10/23/2021 100   08/26/2021 103     GFR Estimate, Non    Date Value   06/09/2020 48 mL/min (L)   01/21/2020 71     GFR Estimate,  (no units)   Date Value   01/21/2020 83   05/16/2019 83     Glucose (mg/dL)   Date Value   10/23/2021  245 (H)   08/26/2021 379 (H)     Bilirubin, Total (mg/dL)   Date Value   10/23/2021 0.3   08/26/2021 0.3       Diagnostics:  ECG: 10/23/2021   Tracing personally reviewd    Imaging   The following maging is reviewed and relevant findings are discussed above.     CT imaging       Code Status: full code  Patient Class: inpatient    IMPRESSION AND PLAN:  1.  COVID 19 INFECTION, COVID PNA, HYPOXEMIC RESPIRATORY FAILURE; HX OF ASTHMA:   Due to BMI, hx of asthma she is at high risk for severe disease;  She is fairly early in the course of illness.  She does agree to remdesivir and decadron.  Discussed that both are used under EUA from FDA.  Side effects of remdesivir are potential renal failure and liver injury and that kidney / liver fx will be monitored closely.  Discussed decadron for anti-inflammatory properties and side effect of hyperglycemia so will follow glucose closely.  Titrate oxygen, will have low threshold to tx to tertiary care should she oxygen demands increase significantly.  Scheduled and prn albuterol MDI.   2.  PERI;  Continue HS CPAP  3.  Type 2 diabetes, poorly controlled:  Last a1c in August was 11.8.  Will continue her pta meds add correction insulin, carb controlled diet, adjust as indicated.   4.  CKD stage 3B:  Follow creat, low dose IV hydration today post CT contrast.  5.  Hypertension, lymphedema:  Continue pta regimen for now; adjust as indicated.   6.  RLS/ depression:  Continue pta regimen.                   DVT/VTE Prophylaxis:  VTE Pharmacologic Prophylaxis:   Per phcy VTE prophylaxis dose for BMI > 50 will be 100 mg bid.  Will monitor factor Xa per protocol, monitor closely for bleeding.    VTE Mechanical Prophylaxis: Yes    Is the patient expected to require at least a two midnight stay in the hospital? Yes -  I certify that I expect inpatient services for greater than two midnights are medically necessary for this patient.  Please see H&P and MD Progress Notes for additional  information about the patient's course of treatment.    OLMAN Nava 10/23/2021 2:04 PM    Patient seen and evaluated, and case discussed with Jett Bhatia DO.    I have separately reviewed the patient's chart, separately examined the patient.  I agree with the note as written above.  Patient currently comfortable.  Plan of care discussed with patient.  All questions answered.  Jett Bhatia D.O.   normal...

## 2021-11-19 NOTE — ED ADULT NURSE NOTE - FALL HARM RISK TYPE OF ASSESSMENT
FOLLOW UP NOTE    PATIENT:   Edouard Arnett    DATE:      11/19/21    HISTORY AND CHIEF COMPLAINT:    Edouard Arnett  is a 43y.o.  year old  female who presents for a follow up of high risk screening. She does perform self breast exams. She denies any breast pain, masses, skin changes, nipple discharge, nipple retraction, or recent breast trauma  . Review of Systems   Constitutional: Negative for activity change, appetite change, chills, diaphoresis, fatigue, fever and unexpected weight change. HENT: Negative for congestion, ear pain, hearing loss, mouth sores, nosebleeds and sore throat. Respiratory: Negative for cough, chest tightness and shortness of breath. Cardiovascular: Negative for chest pain, palpitations and leg swelling. Gastrointestinal: Negative for abdominal pain, nausea and vomiting. Endocrine: Negative for cold intolerance, heat intolerance, polydipsia, polyphagia and polyuria. Genitourinary: Negative for difficulty urinating, menstrual problem and vaginal bleeding. Musculoskeletal: Negative for neck pain and neck stiffness. Skin: Positive for rash (eczema - has dermatologist). Negative for color change, pallor and wound. Allergic/Immunologic: Negative for environmental allergies and immunocompromised state. Neurological: Negative for weakness. Hematological: Does not bruise/bleed easily. Psychiatric/Behavioral: Negative for agitation, confusion, sleep disturbance and suicidal ideas. The patient is not nervous/anxious. PHYSICAL EXAMINATION:    Vitals:    11/19/21 0951   BP: 138/80   Pulse: 72   Resp: 16   Temp: 97.5 °F (36.4 °C)        Physical Exam  Vitals reviewed. Constitutional:       Appearance: Normal appearance. She is well-developed. HENT:      Head: Normocephalic and atraumatic. Nose: Nose normal.   Eyes:      Conjunctiva/sclera: Conjunctivae normal.      Right eye: No hemorrhage.      Left eye: No Daily Assessment

## 2022-02-11 NOTE — ED ADULT NURSE REASSESSMENT NOTE - PSYCHOSOCIAL VERBALIZED FEELINGS
[FreeTextEntry1] : Leg length inequality with pelvic obliquity\par Minimal adolescent idiopathic scoliosis\par Bilateral pes planus\par \par I advised the mother that it would be important to have CT scanogram's of both lower extremities as well as a left hand and wrist x-ray for bone age which should be done now and in approximately 5 months from now.  That would determine whether the patient would do well with a femoral and tibial epiphyseodesis of the longer leg.  If the CT scanogram that will be performed within the next few weeks reveals a near 3 cm leg length inequality then I would just recommend epiphysiodesis at this time while he has approximately 3 years of growth.  That will be determined also by the bone age.  The scoliosis will also have to be followed.  I advised the mother to call after the CT scanogram and bone age has been performed so I can review the study and then I will call her with my recommendation.  I advised the mother that the heel lift that she used is not really a full shoe lift and it requires a lift in both the heel and the sole.  I recommended a 3/8 inch lift at this time.\par \par Encounter time: 30 minutes relief

## 2022-05-14 NOTE — BH PATIENT PROFILE - NSDASANEGATIVE_PSY_ALL_CORE
"Pt reports Tramadol is \"not that much help\", has taken five doses so far and uses with Tylenol and ibuprofen. Pt's wife reports today \"noticed him being negative and irritable, even to the point after PT I suggested he put his leg up and ice it and he said no\". \"Big change because he was very positive\".  Pt usually takes one 50mg Tramadol daily but took two = six hours apart today and pt's wife thinks that may have contributed to negativity. Pt had some \"leftover oxycodone he was using but ran out\". Wife is wondering if Tramadol prescription could be switched.    Advised wife after hours providers do not prescribe narcotics. Pt can try taking Tramadol once daily as before and follow up with PCP when office is open. Call back if further questions or concerns.    Wife verbalizes understanding and agrees to plan.     Reason for Disposition    Caller requesting a CONTROLLED substance prescription refill (e.g., narcotics, ADHD medicines)    Protocols used: MEDICATION QUESTION CALL-A-AH      " Yes

## 2022-09-14 NOTE — BH INPATIENT PSYCHIATRY PROGRESS NOTE - NSBHMSETHTCONTENT_PSY_A_CORE
Sherrie Grimes is a 32 year old female, c/o rash to left forearm x 2 weeks. Pt has tried benadryl cream and hydrocortisone cream without relief.       Visit Vitals  /73   Pulse 72   Temp 98.1 °F (36.7 °C) (Oral)   Resp 18   Ht 4' 11.5\" (1.511 m)   Wt 73.5 kg (162 lb)   LMP 08/13/2022 (Approximate)   SpO2 99%   Breastfeeding No   BMI 32.17 kg/m²       Patient would like communication of their results via:     Cell Phone:   Telephone Information:   Mobile [phone]     Okay to leave a message containing results? Yes  ?    Latex sensitivity.  Medications verified and reviewed.  Allergies verified and reviewed.  Tobacco history verified 9/14/2022.  PCP verified or updated.      Delusions/Other

## 2023-03-10 NOTE — ED PROVIDER NOTE - MUSCULOSKELETAL MINIMAL EXAM
no normal range of motion/no muscle tenderness/motor intact/pain alleviated upon palpation of right anterior shoulder and right anterior chest wall.

## 2023-08-03 NOTE — DISCHARGE NOTE ADULT - PATIENT PORTAL LINK FT
“You can access the Follow[facility] Patient Portal, offered by [facility], by registering with the following website: [email or web address]” DC instructions

## 2023-08-08 NOTE — ED ADULT NURSE NOTE - NS ED NURSE DISCH DISPOSITION
Emergency Department Provider Note       PCP: Leonardo Mendoza MD   Age: 20 y.o. Sex: male     DISPOSITION Decision To Discharge 08/08/2023 06:28:41 PM       ICD-10-CM    1. Facial abscess  L02.01 HYDROcodone-acetaminophen (NORCO) 7.5-325 MG per tablet          Medical Decision Making     Complexity of Problems Addressed:  1 or more acute illnesses that pose a threat to life or bodily function. Data Reviewed and Analyzed:  Category 1:   I independently ordered and reviewed each unique test.  I reviewed external records: ED visit note from an outside group. Category 2:         Category 3: Discussion of management or test interpretation. Patient with left facial abscess. Needle I&D attempted at [facility] yesterday. Small drainage. Swelling increased today. CT scan shows facial abscess. While in the ED patient went to the restroom and squeezed the abscess with a large amount of pus removed. Swelling is decreased. Will discharge home on antibiotics and pain meds and continue warm compress and massage for treatment. Risk of Complications and/or Morbidity of Patient Management:  Prescription drug management performed. Patient was discharged risks and benefits of hospitalization were considered. Shared medical decision making was utilized in creating the patients health plan today. Is this patient to be included in the SEP-1 core measure due to severe sepsis or septic shock? No Exclusion criteria - the patient is NOT to be included for SEP-1 Core Measure due to: 2+ SIRS criteria are not met      History      Donnal Alpers is a 20 y.o. male who presents to the Emergency Department with chief complaint of    Chief Complaint   Patient presents with    Abscess             Patient with left lower facial swelling anterior jaw for the past 3 days. Getting worse. Denies any tooth pain or decay or involvement. Seen last night at [facility].   States they stuck an 18-gauge needle and and did not get much AMA (saw a physician/midlevel provider and clinician was able to provide reasons for staying for treatment & form is signed)

## 2023-08-15 NOTE — ED ADULT NURSE NOTE - QUALITY
Penny Galvin (:  1955) is a 70 y.o. male,Established patient, here for evaluation of the following chief complaint(s):  Diabetes      ASSESSMENT/PLAN:  1. Type 2 diabetes mellitus with other specified complication, with long-term current use of insulin ([facility])  Assessment & Plan:   Stable, controlled  No changes  Continue current treatment plan     Orders:  -      DIABETES FOOT EXAM  2. Essential hypertension  3. Personal history of tobacco use  4. Anxiety  5. Moderate episode of recurrent major depressive disorder ([address])  Assessment & Plan:   Stable, controlled  No changes  Continue current treatment plan   6. Anxiety state  Assessment & Plan:   Stable, controlled  No changes  Continue current treatment plan   Contract today         No follow-ups on file. SUBJECTIVE/OBJECTIVE:  Patient the office today for 6-month follow-up of chronic medical conditions. He has been doing well and taking his medications as prescribed. He has no issues or concerns. Blood sugars been well controlled, last A1c in February was well controlled. He has been on jury, so unable to get lung screening completed. Hemoglobin A1C   Date Value Ref Range Status   02/15/2023 6.8 % Final   He is doing well on current insulin regimen. For the last couple of months, he is having increased charley horses in his legs and feet waking him in the middle of the night. Unsure if diet related.      Current Outpatient Medications   Medication Sig Dispense Refill    ibuprofen (ADVIL;MOTRIN) 600 MG tablet TAKE 1 TABLET BY MOUTH EVERY 6 HOURS WITH FOOD AS NEEDED FOR PAIN 120 tablet 0    diazePAM (VALIUM) 10 MG tablet TAKE 1 TABLET BY MOUTH ONCE DAILY AS NEEDED FOR ANXIETY 28 tablet 0    FLUoxetine (PROZAC) 10 MG capsule Take 1 capsule by mouth once daily 90 capsule 0    lisinopril (PRINIVIL;ZESTRIL) 20 MG tablet Take 1 tablet by mouth once daily 90 tablet 0    varenicline (CHANTIX) 1 MG tablet Take 1 tablet by mouth twice daily 60 tablet 0 anxiety producing

## 2023-09-02 NOTE — ED ADULT TRIAGE NOTE - NS ED TRIAGE AVPU SCALE
Alert-The patient is alert, awake and responds to voice. The patient is oriented to time, place, and person. The triage nurse is able to obtain subjective information. 8 (severe pain)

## 2023-10-03 NOTE — ED ADULT NURSE NOTE - NS ED NURSE DC INFO COMPLEXITY
nausea, vomiting, diarrhea Verbalized Understanding/Simple: Patient demonstrates quick and easy understanding

## 2023-10-10 NOTE — BH INPATIENT PSYCHIATRY PROGRESS NOTE - NSBHFUPINTERVALHXFT_PSY_A_CORE
no Patient is followed up for Schizoaffective Disorder. Patient discussed with nursing staff. Chart, medications and labs reviewed.  Patient remains high risk for aggression, improved though still tenuous behavioral control on the unit.    Patient appears distracted and relatively disinterested in being interviewed; insists on listening to radio in background during interview. Patient continues to demonstrate grandiosity and persecutory delusions; claims to have recorded most of the songs he has recently heard on the radio in the future; claims that others are laughing at him but does not respond when asked to clarify to whom he is referring. Patient continues to express dissatisfaction with being held in unit without his street clothing. Patient reports good sleep and appetite; reports irritable mood and that he has felt sluggish and unmotivated in the past two days compared to last week, which he blames on his medication.       Patient is followed up for Schizoaffective Disorder. Patient discussed with nursing staff. Chart, medications and labs reviewed.  Patient remains high risk for aggression, improved though still tenuous behavioral control on the unit.    Patient encountered in his room, attentive and cooperative. Continues to demonstrate grandiose, persecutory delusions. Says he recorded most of the songs on the radio. Continues to report people are laughing at him but does not respond, though unable to clarify specifics. Patient continues to express dissatisfaction with being held in unit without his street clothing. Patient reports good sleep and appetite; reports irritable mood and that he has felt sluggish and unmotivated in the past two days compared to last week, which he blames on his medication.       Patient is followed up for Schizoaffective Disorder. Patient discussed with nursing staff. Chart, medications and labs reviewed.  Patient remains high risk for aggression, improved though still tenuous behavioral control on the unit.    Patient encountered in his room, attentive and cooperative. Continues to demonstrate grandiose, persecutory delusions. Says he recorded most of the songs on the radio. Continues to report people are laughing at him but does not respond, though unable to clarify specifics. Continued delusions about people at his residence and parole officers and police all being rapists and pedophiles and corrupt criminals. Patient continues to express dissatisfaction with being held in unit without his street clothing. Patient reports good sleep and appetite; reports irritable mood and that he has felt sluggish and unmotivated in the past two days compared to last week, which he blames on his medication though agrees to continue taking. Denies AH/VH.

## 2023-12-08 NOTE — BH INPATIENT PSYCHIATRY PROGRESS NOTE - NSBHCHARTREVIEWVS_PSY_A_CORE FT
Vital Signs Last 24 Hrs  T(C): 36.2 (10-02-21 @ 05:47), Max: 36.5 (10-01-21 @ 19:54)  T(F): 97.2 (10-02-21 @ 05:47), Max: 97.7 (10-01-21 @ 19:54)  HR: 73 (10-01-21 @ 17:11) (73 - 73)  BP: 110/58 (10-01-21 @ 17:11) (110/58 - 110/58)  BP(mean): --  RR: --  SpO2: --    Orthostatic VS  10-01-21 @ 19:54  Lying BP: --/-- HR: --  Sitting BP: 98/61 HR: 76  Standing BP: 110/66 HR: 79  Site: --  Mode: --  Orthostatic VS  09-30-21 @ 18:46  Lying BP: --/-- HR: --  Sitting BP: 111/71 HR: 83  Standing BP: 103/79 HR: 100  Site: --  Mode: --  Orthostatic VS  09-30-21 @ 16:00  Lying BP: --/-- HR: --  Sitting BP: 105/55 HR: 67  Standing BP: 107/65 HR: 79  Site: --  Mode: --  Orthostatic VS  09-30-21 @ 15:42  Lying BP: --/-- HR: --  Sitting BP: 129/85 HR: 76  Standing BP: --/-- HR: --  Site: --  Mode: --   Fall

## 2024-01-02 NOTE — ED ADULT NURSE NOTE - NS ED NURSE NOTIFICATIONS
none available
impaired balance/narrow base of support/pain/impaired postural control/decreased strength

## 2024-01-08 NOTE — ED BEHAVIORAL HEALTH ASSESSMENT NOTE - NSBHSATHC_PSY_A_CORE FT
Inpatient Rehabilitation Psychology Initial Evaluation    Duration: 1500 to 1530    Presenting Problem:  Luis Mejia, a 67 year old male, admitted to Rehabilitation for stroke.  Patient is referred by Dr Urbina to rehabilitation psychology to assess mood, motivation, coping skills and social support.       History:   Medical History:  Reviewed in medical record, Dr. Urbina's Physiatry consult note dated 1/3/24.    Patient Active Problem List   Diagnosis    Ischemic heart disease    HTN (hypertension)    Hyperlipidemia    Type 2 diabetes mellitus with hyperglycemia, without long-term current use of insulin (CMS/Formerly [facility])    Overweight(278.02)    Bradycardia    PAD (peripheral artery disease) (CMD)    Carotid artery disease (CMD)    Osteoarthritis    Peripheral polyneuropathy    Diabetic polyneuropathy associated with type 2 diabetes mellitus (CMS/HCC)    Type 2 diabetes mellitus with diabetic peripheral angiopathy without gangrene, without long-term current use of insulin (CMS/[facility])    Acquired hypothyroidism    Chronic right-sided low back pain with right-sided sciatica    Coronary artery disease involving native heart without angina pectoris    Hypothyroidism due to Hashimoto's thyroiditis    Spinal stenosis of lumbar region    Bilateral carotid artery stenosis without cerebral infarction    Former smoker    Carotid stenosis, left    Acute CVA (cerebrovascular accident) (CMD)       Current Medications:  Scheduled medications:  Current Facility-Administered Medications   Medication Dose Route Frequency Provider Last Rate Last Admin    diclofenac (VOLTAREN) 1 % gel 2 g  2 g Topical TID Akil Urbina MD   2 g at 01/08/24 1446    acetaminophen (TYLENOL) tablet 650 mg  650 mg Oral 4x Daily Mariel Chang PA-C   650 mg at 01/08/24 1451    Or    acetaminophen (TYLENOL) suppository 650 mg  650 mg Rectal 4x Daily Mariel Chang PA-C        lidocaine (LIDOCARE) 4 % patch 2 patch  2 patch Transdermal Daily Octavio  Destiny HERNANDEZ PA-C   2 patch at 01/07/24 1737    melatonin tablet 6 mg  6 mg Oral Nightly Akil Urbina MD   6 mg at 01/07/24 2117    aspirin (ECOTRIN) enteric coated tablet 81 mg  81 mg Oral Daily Akil Urbina MD   81 mg at 01/08/24 0836    rivaroxaban (XARELTO) tablet 20 mg  20 mg Oral Daily with dinner Akil Urbina MD   20 mg at 01/07/24 1712    gabapentin (NEURONTIN) capsule 300 mg  300 mg Oral Daily Akil Urbina MD   300 mg at 01/08/24 0836    And    gabapentin (NEURONTIN) capsule 600 mg  600 mg Oral Nightly Akil Urbina MD   600 mg at 01/07/24 2117    insulin glargine (LANTUS) injection 14 Units  14 Units Subcutaneous Nightly Akil Urbina MD   14 Units at 01/07/24 2117    insulin lispro (ADMELOG,HumaLOG) - Correction Dose   Subcutaneous TID WC Akil Urbina MD   3 Units at 01/08/24 1222    atorvastatin (LIPITOR) tablet 80 mg  80 mg Oral Nightly Akil Urbina MD   80 mg at 01/07/24 2118    losartan (COZAAR) tablet 25 mg  25 mg Oral Daily Akil Urbina MD   25 mg at 01/08/24 0836    amLODIPine (NORVASC) tablet 10 mg  10 mg Oral Daily Akil Urbina MD   10 mg at 01/08/24 0836    hydroCHLOROthiazide (HYDRODIURIL) tablet 25 mg  25 mg Oral Daily Akil Urbina MD   25 mg at 01/08/24 0836    docusate sodium-sennosides (SENOKOT S) 50-8.6 MG 1 tablet  1 tablet Oral Nightly Akil Urbina MD   1 tablet at 01/07/24 2117    polyethylene glycol (MIRALAX) packet 17 g  17 g Oral Daily Akil Urbina MD        Magnesium Standard Replacement Protocol   Does not apply See Admin Instructions Akil Urbina MD        Potassium Standard Replacement Protocol (Levels 3.5 and lower)   Does not apply See Admin Instructions Akil Urbina MD        clopidogrel (PLAVIX) tablet 75 mg  75 mg Oral Daily Akil Urbina MD   75 mg at 01/08/24 0836    levothyroxine (SYNTHROID, LEVOTHROID) tablet 150 mcg  150 mcg Oral Daily Akil Urbina MD   150 mcg at 01/08/24 0550    Phosphorus  Standard Replacement Protocol   Does not apply See Admin Instructions Akil Urbina MD         Continuous Infusions:  Current Facility-Administered Medications   Medication Dose Route Frequency Provider Last Rate Last Admin     PRN Meds:  Current Facility-Administered Medications   Medication Dose Route Frequency Provider Last Rate Last Admin    diphenhydrAMINE (BENADRYL) capsule 25 mg  25 mg Oral Nightly PRN Destiny Cunningham PA-C        gabapentin (NEURONTIN) capsule 300 mg  300 mg Oral Nightly PRN Akil Urbina MD        dextrose (GLUTOSE) 40 % gel 15 g  15 g Oral PRN Akil Urbina MD        dextrose (GLUTOSE) 40 % gel 30 g  30 g Oral PRN Akil Urbina MD        glucagon (GLUCAGEN) injection 1 mg  1 mg Intramuscular PRN Akil Urbina MD        ondansetron (ZOFRAN ODT) disintegrating tablet 4 mg  4 mg Oral Q12H PRN kAil Urbina MD        Or    ondansetron (ZOFRAN) injection 4 mg  4 mg Intravenous Q12H PRN Akil Urbina MD        sodium chloride 0.9 % flush bag 25 mL  25 mL Intravenous PRN Akil Urbina MD        dextrose 50 % injection 12.5 g  12.5 g Intravenous PRN Akil Urbina MD        dextrose 50 % injection 25 g  25 g Intravenous PRN Akil Urbina MD        bisacodyl (DULCOLAX) suppository 10 mg  10 mg Rectal Daily PRN Akil Urbina MD           Social History:  Patient currently resides in [place], [place] with his wife, Rosy. Pt has been  for 28 years. He was previously  and has two sons from his first marriage. Pt and his wife are both retired from [employer]. Pt is an [employer] . He did not serve in combat. Pt and his wife enjoy traveling. Pt was also an avid golfer. Pt was previously independent..      Family History:   Pt with family history of heart disease.     Mental Health History:   Previous therapy: no  Previous psychiatric treatment and medication trials: no  Previous psychiatric hospitalizations: no  Previous diagnoses: no  Previous  suicide attempts: no    Substance Abuse History:  Recreational drugs: no  Use of alcohol: minimal use- moderate use  Tobacco use: no. Pt quit in 2006    Current Evaluation:     Mental Status Evaluation:    Behavior:  Appearance:  Well Groomed  Eye Contact:  Good  Motor:  Normal  Demeanor: Friendly  Speech: Clear    Cognition:  Orientation: Oriented in All Areas  Language Deficits: None  Memory Deficits: None  Concentration: Intact  Judgement:  Intact  Insight: Good  Intellectual: Average    Affect:  Affect:Appropriate  Motivation: Good  Mood: pleasant, talkative, and friendly. Pt with no overt sadness, worry, or distress. He remarked that he feels much better now that he was made mod I in his room. Pt has been participating well. He feels he is receiving good care. Pt with some right shoulder pain. RN rubbed on Voltaren gel. Pt's wife present in room. She indicated feeling that pt is recovering well. She expressed no concerns regarding discharge.  Suicidal: No  Homicidal: No    Perception:  Thoughts: Coherent  Content: Appropriate  Hallucination:No    Somatic:  Appetite:Adequate  Sleep: fair due to discomfort  Pain: right shoulder pain, Voltaren gel applied by RN and Tylenol given  Sexuality: relationship with wife.     Patient Diagnostic Summary:  Pt was alert and engaged during the evaluation. His wife was present with pt's permission. Pt and his wife were both pleasant and appeared forthcoming with providing personal information. Pt acknowledged the reason for hospitalization and the elective surgery he underwent prior to the stroke. Pt with no overt sadness, worry, or distress. He remarked that he feels much better now that he was made mod I in his room. Pt has been participating well. He feels he is receiving good care. Pt with some right shoulder pain. RN rubbed on Voltaren gel. Pt's wife pt indicated feeling that pt is recovering well. She expressed no concerns regarding discharge. Pt was educated on the role  of the rehab psychologist and was open to writer being involved in pt's care.  Writer will monitor pt's mood and offer support while he is on the rehab unit. Patient has no cognitive or speech deficits that would preclude patient from participating and benefitting from psychotherapy.    Diagnostic Impression:  I63.9 Acute Stroke      Treatment Plan:     Treatment Goals:  Specify outcomes written in observable, behavioral terms:   Offer support and education to aid in pt's adjustment to recent stroke. Maintain motivation.     Treatment Plan/Recommendations:  1:1 sessions 1-2 times prior to discharge    Recommendations to staff:  Provide ongoing education to pt regarding:  stroke recovery and rehab needs at discharge.    Coni Murillo PsyD  Rehabilitation Psychologist    last use 2 days ago, states he smokes when he can "Get it".

## 2024-03-17 NOTE — ED ADULT NURSE NOTE - NS ED NOTE ABUSE RESPONSE YN
3/17 Potassium 3.1 ; Magnesium wnl   Possible due to decreased PO Intake    S/p 20 meq IV K ; will give additional 40 meq PO   Daily CMP and revaluate    Yes

## 2024-06-27 ENCOUNTER — OFFICE (OUTPATIENT)
Dept: URBAN - METROPOLITAN AREA CLINIC 104 | Facility: CLINIC | Age: 44
Setting detail: OPHTHALMOLOGY
End: 2024-06-27
Payer: MEDICAID

## 2024-06-27 VITALS — HEIGHT: 60 IN

## 2024-06-27 DIAGNOSIS — S02.3XXA: ICD-10-CM

## 2024-06-27 DIAGNOSIS — H11.153: ICD-10-CM

## 2024-06-27 PROBLEM — H25.13 CATARACT SENILE NUCLEAR SCLEROSIS; BOTH EYES: Status: ACTIVE | Noted: 2024-06-27

## 2024-06-27 PROCEDURE — 92004 COMPRE OPH EXAM NEW PT 1/>: CPT | Performed by: OPTOMETRIST

## 2024-06-27 PROCEDURE — 92250 FUNDUS PHOTOGRAPHY W/I&R: CPT | Performed by: OPTOMETRIST

## 2024-06-27 ASSESSMENT — CONFRONTATIONAL VISUAL FIELD TEST (CVF)
OS_FINDINGS: FULL
OD_FINDINGS: FULL

## 2024-08-05 NOTE — BH INPATIENT PSYCHIATRY PROGRESS NOTE - NSCGISEVERILLNESS_PSY_ALL_CORE
Continue to monitor urine and stool for signs and symptoms of bleeding.   Please notify the clinic of any medication changes.   Please remember to bring all medications (both prescription and OTC) to your next visit.   Kindly notify the clinic if you are unable to make to your next appointment.   Follow warfarin dosing instructions on dosing calendar provided.     
5 = Markedly ill - intrusive symptoms that distinctly impair social/occupational function or cause intrusive levels of distress

## 2024-09-20 ENCOUNTER — EMERGENCY (EMERGENCY)
Facility: HOSPITAL | Age: 44
LOS: 1 days | Discharge: HOME | End: 2024-09-20
Attending: STUDENT IN AN ORGANIZED HEALTH CARE EDUCATION/TRAINING PROGRAM
Payer: MEDICAID

## 2024-09-20 VITALS
TEMPERATURE: 98 F | WEIGHT: 164.91 LBS | OXYGEN SATURATION: 94 % | HEART RATE: 94 BPM | RESPIRATION RATE: 18 BRPM | SYSTOLIC BLOOD PRESSURE: 127 MMHG | DIASTOLIC BLOOD PRESSURE: 76 MMHG

## 2024-09-20 VITALS
DIASTOLIC BLOOD PRESSURE: 76 MMHG | OXYGEN SATURATION: 93 % | HEART RATE: 83 BPM | RESPIRATION RATE: 18 BRPM | TEMPERATURE: 98 F | SYSTOLIC BLOOD PRESSURE: 123 MMHG

## 2024-09-20 PROCEDURE — 99283 EMERGENCY DEPT VISIT LOW MDM: CPT | Mod: 25

## 2024-09-20 PROCEDURE — 71046 X-RAY EXAM CHEST 2 VIEWS: CPT

## 2024-09-20 PROCEDURE — 99284 EMERGENCY DEPT VISIT MOD MDM: CPT

## 2024-09-20 PROCEDURE — 71046 X-RAY EXAM CHEST 2 VIEWS: CPT | Mod: 26

## 2024-09-20 PROCEDURE — 94640 AIRWAY INHALATION TREATMENT: CPT

## 2024-09-20 RX ORDER — PREDNISONE 10 MG
50 TABLET, DOSE PACK ORAL ONCE
Refills: 0 | Status: COMPLETED | OUTPATIENT
Start: 2024-09-20 | End: 2024-09-20

## 2024-09-20 RX ORDER — AZITHROMYCIN 500 MG/1
500 TABLET, FILM COATED ORAL ONCE
Refills: 0 | Status: COMPLETED | OUTPATIENT
Start: 2024-09-20 | End: 2024-09-20

## 2024-09-20 RX ORDER — AMOXICILLIN AND CLAVULANATE POTASSIUM 250; 125 MG/1; MG/1
875 TABLET, FILM COATED ORAL
Qty: 14 | Refills: 0
Start: 2024-09-20 | End: 2024-09-26

## 2024-09-20 RX ORDER — AZITHROMYCIN 500 MG/1
1 TABLET, FILM COATED ORAL
Qty: 1 | Refills: 0
Start: 2024-09-20 | End: 2024-09-24

## 2024-09-20 RX ORDER — AMOXICILLIN AND CLAVULANATE POTASSIUM 250; 125 MG/1; MG/1
1 TABLET, FILM COATED ORAL ONCE
Refills: 0 | Status: COMPLETED | OUTPATIENT
Start: 2024-09-20 | End: 2024-09-20

## 2024-09-20 RX ADMIN — Medication 2 PUFF(S): at 18:41

## 2024-09-20 RX ADMIN — Medication 50 MILLIGRAM(S): at 18:40

## 2024-09-20 RX ADMIN — AZITHROMYCIN 500 MILLIGRAM(S): 500 TABLET, FILM COATED ORAL at 19:27

## 2024-09-20 RX ADMIN — AMOXICILLIN AND CLAVULANATE POTASSIUM 1 TABLET(S): 250; 125 TABLET, FILM COATED ORAL at 19:27

## 2024-09-20 NOTE — ED PROVIDER NOTE - CLINICAL SUMMARY MEDICAL DECISION MAKING FREE TEXT BOX
Pt is a 42 yo M with PMH Schizoaffective psychosis, cocaine and nicotine use disorder and PTSD who presents to ED c/o 3 weeks of cough. CXR showing signs of atypical PNA. One dose augmentin and azithromycin administered. Further prescription sent to pharmacy. Patient states breathing better after nebs and steroid. Stable for DC.

## 2024-09-20 NOTE — ED ADULT NURSE NOTE - OBJECTIVE STATEMENT
Pt presents to ED with c/o of coughing x 4 weeks. Pt states he has thick white mucous that comes up when he coughs. pt denies having fevers/chest pain. Respirations are even and unlabored. A&Ox3.

## 2024-09-20 NOTE — ED PROVIDER NOTE - OBJECTIVE STATEMENT
Pt is a 44 yo M with PMH Schizoaffective psychosis, cocaine and nicotine use disorder and PTSD who presents to ED c/o 3 weeks of cough. Pt currently resides in psychiatric housing and follows with [facility] for his behavioral health management. He states he has always had a chronic cough due to his smoking habits, he smokes half PPD of cigarettes currently. However for the past 3 weeks his cough has been more active, he has been bringing up some mucous predominant sputum and states it has been more difficult to take deep breaths during this time. He also had chills intermittently during this time but did not have n/v/d, or fevers. He denies any chest pain, headache, loss of vision or syncope.

## 2024-09-20 NOTE — ED PROVIDER NOTE - ATTENDING CONTRIBUTION TO CARE
44 yo M with PMH Schizoaffective psychosis, cocaine and nicotine use disorder and PTSD who presents to ED c/o 3 weeks of cough. He states he has always had a chronic cough due to his smoking cigarettes. However, for the past 3 weeks his cough has been more intense and he noticed increase phelgm and states it has been more difficult to take deep breaths at times. He also had chills intermittently during this time but did not have n/v/d, fevers, chest pain, headache, loss of vision or syncope.    Gen: Alert, NAD  Head: NC, PER, EOMI, normal lids/conjunctiva  ENT: normal hearing, patent oropharynx without erythema/exudate, uvula midline  Neck: +supple, no tenderness/meningismus/JVD, +Trachea midline  Pulm: normal bilateral BS, normal resp effort, no wheeze/stridor/retractions  CV: RRR, no murmur  Skin: no rash  Neuro: AAOx3, no gross sensory/motor deficits    INani, personally saw the patient with the resident, and completed the key components of the history and physical exam. I then discussed the management plan with the resident.

## 2024-09-20 NOTE — ED ADULT TRIAGE NOTE - CHIEF COMPLAINT QUOTE
C/O productive cough for the last month with intermittent SOB. PT was prescribed Mucinex and Robitussin but has felt minimum relief. Max chest pain or fevers.

## 2024-09-20 NOTE — ED PROVIDER NOTE - PATIENT PORTAL LINK FT
You can access the FollowMyHealth Patient Portal offered by [facility] by registering at the following website: [email or web address]. By joining [employer]’s FollowMyHealth portal, you will also be able to view your health information using other applications (apps) compatible with our system.

## 2024-09-20 NOTE — ED PROVIDER NOTE - PHYSICAL EXAMINATION
Gen: NAD, AOx3  Head: NCAT  HEENT: EOMI, oral mucosa moist, normal conjunctiva, neck supple  Lung: inspiratory wheezes heard in mid and low R lung zones, no respiratory distress  CV: rrr, no murmur, Normal perfusion  Abd: soft, NTND  MSK: No edema, no visible deformities  Neuro: No focal neurologic deficits  Skin: No rash   Psych: normal affect

## 2024-09-20 NOTE — ED PROVIDER NOTE - NSFOLLOWUPINSTRUCTIONS_ED_ALL_ED_FT
1. Return to ED for worsening, progressive or any other concerning symptoms   2. Follow up with your primary care doctor in 2-3days    Pneumonia    Pneumonia is an infection of the lungs. Pneumonia may be caused by bacteria, viruses, or funguses. Symptoms include coughing, fever, chest pain when breathing deeply or coughing, shortness of breath, fatigue, or muscle aches. Pneumonia can be diagnosed with a medical history and physical exam, as well as other tests which may include a chest X-ray. If you were prescribed an antibiotic medicine, take it as told by your health care provider and do not stop taking the antibiotic even if you start to feel better. Do not use tobacco products, including cigarettes, chewing tobacco, and e-cigarettes.    SEEK IMMEDIATE MEDICAL CARE IF YOU HAVE ANY OF THE FOLLOWING SYMPTOMS: worsening shortness of breath, worsening chest pain, coughing up blood, change in mental status, lightheadedness/dizziness.

## 2024-09-21 PROBLEM — F12.99: Chronic | Status: ACTIVE | Noted: 2021-09-30

## 2024-09-21 PROBLEM — F17.200 NICOTINE DEPENDENCE, UNSPECIFIED, UNCOMPLICATED: Chronic | Status: ACTIVE | Noted: 2021-09-30

## 2024-09-21 PROBLEM — F14.10 COCAINE ABUSE, UNCOMPLICATED: Chronic | Status: ACTIVE | Noted: 2021-09-30

## 2024-10-21 ENCOUNTER — EMERGENCY (EMERGENCY)
Facility: HOSPITAL | Age: 44
LOS: 1 days | Discharge: HOME | End: 2024-10-21
Attending: EMERGENCY MEDICINE
Payer: MEDICAID

## 2024-10-21 VITALS
HEART RATE: 76 BPM | WEIGHT: 160.28 LBS | DIASTOLIC BLOOD PRESSURE: 68 MMHG | OXYGEN SATURATION: 97 % | TEMPERATURE: 99 F | HEIGHT: 66 IN | SYSTOLIC BLOOD PRESSURE: 149 MMHG | RESPIRATION RATE: 18 BRPM

## 2024-10-21 PROCEDURE — 99283 EMERGENCY DEPT VISIT LOW MDM: CPT

## 2024-10-21 PROCEDURE — 99282 EMERGENCY DEPT VISIT SF MDM: CPT

## 2024-10-21 RX ORDER — OFLOXACIN 3 MG/ML
1 SOLUTION/ DROPS OPHTHALMIC
Qty: 1 | Refills: 0
Start: 2024-10-21 | End: 2024-10-27

## 2024-10-21 RX ORDER — FLUTICASONE PROPIONATE 50 UG/1
2 SPRAY, METERED NASAL
Qty: 1 | Refills: 0
Start: 2024-10-21

## 2024-10-21 NOTE — ED PROVIDER NOTE - CARE PROVIDER_API CALL
Ck Montes  Ophthalmology  [address] [place] [address], [address]  [place] [address]  Phone: [phone]  Fax: [phone]  Follow Up Time:

## 2024-10-21 NOTE — ED PROVIDER NOTE - OBJECTIVE STATEMENT
43-year-old male past medical history of schizophrenia, substance abuse, and PTSD presents emergency department complaining of intermittent nasal congestion over the past 1 year.  Patient also states that he noticed that his right eye had some discharge from it this morning upon waking up.  Denies any headache, dizziness, neck pain, neck stiffness, fever, chills, eye pain, visual changes, chest pain, shortness of breath, palpitations, abdominal pain, nausea or vomiting.

## 2024-10-21 NOTE — ED PROVIDER NOTE - PATIENT PORTAL LINK FT
You can access the FollowMyHealth Patient Portal offered by [facility] by registering at the following website: [email or web address]. By joining [employer]’s FollowMyHealth portal, you will also be able to view your health information using other applications (apps) compatible with our system.

## 2024-10-21 NOTE — ED ADULT TRIAGE NOTE - CHIEF COMPLAINT QUOTE
pt states he has had cold like symptoms x1 year, states he has been seen for symptoms and was told 3 weeks ago has an PN. admits to finishing course of abx. also states he had orbital fx in 2012 and now with cold like symptoms eye is swollen, red and had puss in eye this am.

## 2024-10-21 NOTE — ED PROVIDER NOTE - EYES, MLM
Rt eye: No conjunctival injection, there is scant discharge noted to the medial aspect of the eye, there is mild right periorbital edema and mild erythema noted.  Extraocular movements are intact without pain.. Lt eye: wnl

## 2024-10-21 NOTE — ED PROVIDER NOTE - CLINICAL SUMMARY MEDICAL DECISION MAKING FREE TEXT BOX
44-year-old male presents emergency department intermittent flulike symptoms for 1 year now with right eye discharge.  Mild right periorbital swelling noted and some scant discharge on exam.  Will treat with Augmentin for early preseptal cellulitis and antibiotic eyedrops with outpatient follow-up.  ED return precautions discussed

## 2024-10-21 NOTE — ED PROVIDER NOTE - ATTENDING APP SHARED VISIT CONTRIBUTION OF CARE
Reports constant URI symptoms including nasal congestion and cough.  Notes irritation of right eye starting yesterday: Itchiness.  Awoke today with swelling and drainage from eye.  Denies any visual abnormalities.  Denies pain with eye movements.  Denies fever.  Physical examination:  mild right periorbital soft tissue swelling with subtle erythema and crusting of eyelashes, EOMI, minimal bulbar conjunctival injection, chest clear to auscultation bilateral, heart regular.  A/P mild preseptal cellulitis treatment with oral antibiotics and nasal steroid spray

## 2024-10-21 NOTE — ED PROVIDER NOTE - NSFOLLOWUPINSTRUCTIONS_ED_ALL_ED_FT
Please follow-up with your doctor within 48 hours.  Please follow-up with ophthalmology within 3 days.    Conjunctivitis    Conjunctivitis is an inflammation of the clear membrane that covers the white part of your eye and the inner surface of your eyelid (conjunctiva). Symptoms include eye redness, eye pain, tearing and drainage, crusting of eyelids, swollen eyelids, and light sensitivity. Conjunctivitis may be contagious and easily spread from person to person. It can be caused by a virus, bacteria, or as part of an allergic reaction; the treatment depends on the type of conjunctivitis suspected. Avoid touching or rubbing your eyes and wipe away any drainage gently with a warm wet washcloth. Do not wear contact lenses until the inflammation is gone – wear glasses until your health care provider says it is safe to wear contact again. Do not share towels or washcloths that may spread the infection and wash your hands frequently.    SEEK IMMEDIATE MEDICAL CARE IF YOU HAVE ANY OF THE FOLLOWING SYMPTOMS: increasing pain, blurry vision, blindness, fever, or facial pain/redness/swelling.     Cellulitis    Cellulitis is a skin infection caused by bacteria. This condition occurs most often in the arms and lower legs but can occur anywhere over the body. Symptoms include redness, swelling, warm skin, tenderness, and chills/fever. If you were prescribed an antibiotic medicine, take it as told by your health care provider. Do not stop taking the antibiotic even if you start to feel better.    SEEK IMMEDIATE MEDICAL CARE IF YOU HAVE ANY OF THE FOLLOWING SYMPTOMS: worsening fever, red streaks coming from affected area, vomiting or diarrhea, or dizziness/lightheadedness.

## 2025-02-25 NOTE — ED ADULT NURSE NOTE - PAIN RATING/NUMBER SCALE (0-10): ACTIVITY
Is This A New Presentation, Or A Follow-Up?: Skin Lesion What Type Of Note Output Would You Prefer (Optional)?: Standard Output How Severe Is Your Skin Lesion?: mild Has Your Skin Lesion Been Treated?: not been treated 0

## 2025-05-22 NOTE — ED ADULT TRIAGE NOTE - ESI TRIAGE ACUITY LEVEL, MLM
Chronic, worsening (exacerbation), changes made today: will add steroids and antibiotic. C/w advair and albuterol     Orders:    predniSONE (DELTASONE) 10 MG tablet; Take 4 tablets by mouth once daily for 5 days    
3